# Patient Record
Sex: FEMALE | Race: WHITE | Employment: PART TIME | URBAN - METROPOLITAN AREA
[De-identification: names, ages, dates, MRNs, and addresses within clinical notes are randomized per-mention and may not be internally consistent; named-entity substitution may affect disease eponyms.]

---

## 2017-03-20 ENCOUNTER — TELEPHONE (OUTPATIENT)
Dept: FAMILY MEDICINE CLINIC | Age: 32
End: 2017-03-20

## 2017-03-27 ENCOUNTER — OFFICE VISIT (OUTPATIENT)
Dept: FAMILY MEDICINE CLINIC | Age: 32
End: 2017-03-27

## 2017-03-27 VITALS
OXYGEN SATURATION: 98 % | WEIGHT: 218.1 LBS | HEIGHT: 66 IN | RESPIRATION RATE: 13 BRPM | SYSTOLIC BLOOD PRESSURE: 124 MMHG | BODY MASS INDEX: 35.05 KG/M2 | DIASTOLIC BLOOD PRESSURE: 80 MMHG | HEART RATE: 89 BPM

## 2017-03-27 DIAGNOSIS — E11.9 TYPE 2 DIABETES MELLITUS WITHOUT COMPLICATION, WITHOUT LONG-TERM CURRENT USE OF INSULIN (HCC): Primary | ICD-10-CM

## 2017-03-27 DIAGNOSIS — E78.2 MIXED HYPERLIPIDEMIA: ICD-10-CM

## 2017-03-27 DIAGNOSIS — E88.81 METABOLIC SYNDROME: ICD-10-CM

## 2017-03-27 DIAGNOSIS — M79.672 PAIN OF LEFT HEEL: ICD-10-CM

## 2017-03-27 PROCEDURE — 99214 OFFICE O/P EST MOD 30 MIN: CPT | Performed by: NURSE PRACTITIONER

## 2017-03-27 RX ORDER — ATORVASTATIN CALCIUM 20 MG/1
20 TABLET, FILM COATED ORAL DAILY
Qty: 90 TABLET | Refills: 3 | Status: SHIPPED | OUTPATIENT
Start: 2017-03-27 | End: 2017-12-11 | Stop reason: SDUPTHER

## 2017-03-27 ASSESSMENT — PATIENT HEALTH QUESTIONNAIRE - PHQ9
SUM OF ALL RESPONSES TO PHQ QUESTIONS 1-9: 0
2. FEELING DOWN, DEPRESSED OR HOPELESS: 0
SUM OF ALL RESPONSES TO PHQ9 QUESTIONS 1 & 2: 0
1. LITTLE INTEREST OR PLEASURE IN DOING THINGS: 0

## 2017-03-27 ASSESSMENT — ENCOUNTER SYMPTOMS
ABDOMINAL PAIN: 0
COUGH: 0
NAUSEA: 0
SHORTNESS OF BREATH: 0

## 2017-05-12 ENCOUNTER — PATIENT MESSAGE (OUTPATIENT)
Dept: FAMILY MEDICINE CLINIC | Age: 32
End: 2017-05-12

## 2017-05-12 DIAGNOSIS — F41.1 GAD (GENERALIZED ANXIETY DISORDER): Primary | ICD-10-CM

## 2017-05-15 RX ORDER — VENLAFAXINE HYDROCHLORIDE 75 MG/1
75 CAPSULE, EXTENDED RELEASE ORAL DAILY
Qty: 30 CAPSULE | Refills: 2 | Status: SHIPPED | OUTPATIENT
Start: 2017-05-15 | End: 2017-06-09 | Stop reason: SDUPTHER

## 2017-06-07 ENCOUNTER — PATIENT MESSAGE (OUTPATIENT)
Dept: FAMILY MEDICINE CLINIC | Age: 32
End: 2017-06-07

## 2017-06-07 DIAGNOSIS — F41.1 GAD (GENERALIZED ANXIETY DISORDER): ICD-10-CM

## 2017-06-09 RX ORDER — VENLAFAXINE HYDROCHLORIDE 75 MG/1
75 CAPSULE, EXTENDED RELEASE ORAL DAILY
Qty: 90 CAPSULE | Refills: 1 | Status: SHIPPED | OUTPATIENT
Start: 2017-06-09 | End: 2017-12-11 | Stop reason: SDUPTHER

## 2017-08-17 ENCOUNTER — PATIENT MESSAGE (OUTPATIENT)
Dept: FAMILY MEDICINE CLINIC | Age: 32
End: 2017-08-17

## 2017-08-17 DIAGNOSIS — E11.9 TYPE 2 DIABETES MELLITUS WITHOUT COMPLICATION, WITHOUT LONG-TERM CURRENT USE OF INSULIN (HCC): ICD-10-CM

## 2017-09-27 ENCOUNTER — OFFICE VISIT (OUTPATIENT)
Dept: FAMILY MEDICINE CLINIC | Age: 32
End: 2017-09-27
Payer: COMMERCIAL

## 2017-09-27 VITALS
BODY MASS INDEX: 35.95 KG/M2 | DIASTOLIC BLOOD PRESSURE: 84 MMHG | HEART RATE: 92 BPM | RESPIRATION RATE: 20 BRPM | HEIGHT: 66 IN | SYSTOLIC BLOOD PRESSURE: 132 MMHG | WEIGHT: 223.7 LBS

## 2017-09-27 DIAGNOSIS — E78.2 MIXED HYPERLIPIDEMIA: ICD-10-CM

## 2017-09-27 DIAGNOSIS — E11.9 TYPE 2 DIABETES MELLITUS WITHOUT COMPLICATION, WITHOUT LONG-TERM CURRENT USE OF INSULIN (HCC): Primary | ICD-10-CM

## 2017-09-27 DIAGNOSIS — E66.09 OBESITY DUE TO EXCESS CALORIES, UNSPECIFIED OBESITY SEVERITY: ICD-10-CM

## 2017-09-27 LAB
CREATININE URINE POCT: 300
HBA1C MFR BLD: 7.1 %
MICROALBUMIN/CREAT 24H UR: 150 MG/G{CREAT}
MICROALBUMIN/CREAT UR-RTO: ABNORMAL

## 2017-09-27 PROCEDURE — 83036 HEMOGLOBIN GLYCOSYLATED A1C: CPT | Performed by: NURSE PRACTITIONER

## 2017-09-27 PROCEDURE — 82044 UR ALBUMIN SEMIQUANTITATIVE: CPT | Performed by: NURSE PRACTITIONER

## 2017-09-27 PROCEDURE — 99214 OFFICE O/P EST MOD 30 MIN: CPT | Performed by: NURSE PRACTITIONER

## 2017-09-27 ASSESSMENT — ENCOUNTER SYMPTOMS
NAUSEA: 0
ABDOMINAL PAIN: 0
COUGH: 0
SHORTNESS OF BREATH: 0

## 2017-11-07 ENCOUNTER — OFFICE VISIT (OUTPATIENT)
Dept: FAMILY MEDICINE CLINIC | Age: 32
End: 2017-11-07
Payer: COMMERCIAL

## 2017-11-07 ENCOUNTER — TELEPHONE (OUTPATIENT)
Dept: FAMILY MEDICINE CLINIC | Age: 32
End: 2017-11-07

## 2017-11-07 VITALS
DIASTOLIC BLOOD PRESSURE: 76 MMHG | RESPIRATION RATE: 13 BRPM | BODY MASS INDEX: 35.14 KG/M2 | OXYGEN SATURATION: 98 % | WEIGHT: 223.9 LBS | TEMPERATURE: 98.2 F | HEIGHT: 67 IN | HEART RATE: 94 BPM | SYSTOLIC BLOOD PRESSURE: 126 MMHG

## 2017-11-07 DIAGNOSIS — K64.4 EXTERNAL HEMORRHOIDS: ICD-10-CM

## 2017-11-07 DIAGNOSIS — M79.672 HEEL PAIN, BILATERAL: ICD-10-CM

## 2017-11-07 DIAGNOSIS — M79.671 HEEL PAIN, BILATERAL: ICD-10-CM

## 2017-11-07 DIAGNOSIS — G44.209 ACUTE NON INTRACTABLE TENSION-TYPE HEADACHE: ICD-10-CM

## 2017-11-07 DIAGNOSIS — E11.9 TYPE 2 DIABETES MELLITUS WITHOUT COMPLICATION, WITHOUT LONG-TERM CURRENT USE OF INSULIN (HCC): ICD-10-CM

## 2017-11-07 DIAGNOSIS — E88.81 METABOLIC SYNDROME: ICD-10-CM

## 2017-11-07 DIAGNOSIS — R20.2 NUMBNESS AND TINGLING IN BOTH HANDS: Primary | ICD-10-CM

## 2017-11-07 DIAGNOSIS — R20.0 NUMBNESS AND TINGLING IN BOTH HANDS: Primary | ICD-10-CM

## 2017-11-07 PROCEDURE — 1036F TOBACCO NON-USER: CPT | Performed by: NURSE PRACTITIONER

## 2017-11-07 PROCEDURE — G8482 FLU IMMUNIZE ORDER/ADMIN: HCPCS | Performed by: NURSE PRACTITIONER

## 2017-11-07 PROCEDURE — 99214 OFFICE O/P EST MOD 30 MIN: CPT | Performed by: NURSE PRACTITIONER

## 2017-11-07 PROCEDURE — G8427 DOCREV CUR MEDS BY ELIG CLIN: HCPCS | Performed by: NURSE PRACTITIONER

## 2017-11-07 PROCEDURE — 96372 THER/PROPH/DIAG INJ SC/IM: CPT | Performed by: NURSE PRACTITIONER

## 2017-11-07 PROCEDURE — 3045F PR MOST RECENT HEMOGLOBIN A1C LEVEL 7.0-9.0%: CPT | Performed by: NURSE PRACTITIONER

## 2017-11-07 PROCEDURE — G8417 CALC BMI ABV UP PARAM F/U: HCPCS | Performed by: NURSE PRACTITIONER

## 2017-11-07 RX ORDER — DICLOFENAC SODIUM 75 MG/1
75 TABLET, DELAYED RELEASE ORAL 2 TIMES DAILY
Qty: 60 TABLET | Refills: 3 | Status: SHIPPED | OUTPATIENT
Start: 2017-11-07 | End: 2018-01-29

## 2017-11-07 ASSESSMENT — ENCOUNTER SYMPTOMS
BLOOD IN STOOL: 0
RECTAL PAIN: 1
COUGH: 0
SHORTNESS OF BREATH: 0
NAUSEA: 0
ANAL BLEEDING: 0
ABDOMINAL PAIN: 0

## 2017-11-07 NOTE — PROGRESS NOTES
1127    CO2 24 03/22/2017 1127    BUN 8 03/22/2017 1127    CREATININE 0.6 03/22/2017 1127        Component Value Date/Time    CALCIUM 9.1 03/22/2017 1127    ALKPHOS 67 03/22/2017 1127    AST 12 03/22/2017 1127    ALT 25 03/22/2017 1127    BILITOT 0.2 (L) 03/22/2017 1127            Lab Results   Component Value Date    TSH 1.280 05/14/2015       Lab Results   Component Value Date    WBC 16.2 (H) 06/10/2017    HGB 12.7 06/10/2017    HCT 38.3 06/10/2017    MCV 84.2 06/10/2017     06/10/2017         Health Maintenance   Topic Date Due    DTaP/Tdap/Td vaccine (1 - Tdap) 01/15/2004    Pneumococcal med risk (1 of 1 - PPSV23) 01/15/2004    Diabetic foot exam  02/06/2017    Diabetic retinal exam  03/22/2018    Lipid screen  03/22/2018    Diabetic hemoglobin A1C test  09/27/2018    Diabetic microalbuminuria test  09/27/2018    Cervical cancer screen  01/08/2019    Flu vaccine  Completed    HIV screen  Addressed       Immunization History   Administered Date(s) Administered    Influenza Vaccine, unspecified formulation 09/29/2016    Influenza Virus Vaccine 09/26/2017       Review of Systems   Constitutional: Negative for chills and fever. HENT: Negative. Respiratory: Negative for cough and shortness of breath. Cardiovascular: Negative for chest pain. Gastrointestinal: Positive for rectal pain. Negative for abdominal pain, anal bleeding, blood in stool and nausea. Musculoskeletal: Positive for arthralgias. Skin: Negative for rash. Neurological: Positive for weakness, numbness and headaches. Negative for dizziness and light-headedness. Psychiatric/Behavioral: Negative. Objective:   Physical Exam   Constitutional: She is oriented to person, place, and time. Vital signs are normal. She appears well-developed and well-nourished. She is active. She does not have a sickly appearance. No distress.    HENT:   Right Ear: Tympanic membrane normal.   Left Ear: Tympanic membrane normal.   Nose: Nose normal.   Mouth/Throat: Oropharynx is clear and moist and mucous membranes are normal.   Eyes: EOM are normal. Pupils are equal, round, and reactive to light. Cardiovascular: Normal rate, regular rhythm, S1 normal, S2 normal, normal heart sounds and normal pulses. Exam reveals no S3. No murmur heard. Pulmonary/Chest: Effort normal and breath sounds normal. She has no decreased breath sounds. She has no wheezes. She has no rhonchi. Abdominal: Soft. Bowel sounds are normal. There is no tenderness. Genitourinary:   Genitourinary Comments: Exam deferred    Musculoskeletal:        Right elbow: She exhibits normal range of motion. Left elbow: She exhibits normal range of motion. Right wrist: She exhibits decreased range of motion and tenderness. Left wrist: She exhibits decreased range of motion and tenderness. Cervical back: She exhibits normal range of motion, no tenderness and no bony tenderness. Right hand: She exhibits normal range of motion. Decreased sensation noted. Decreased strength noted. Left hand: She exhibits normal range of motion. Decreased sensation noted. Decreased strength noted. Feet:    Neurological: She is alert and oriented to person, place, and time. No cranial nerve deficit. She displays a negative Romberg sign. Coordination and gait normal.   Psychiatric: She has a normal mood and affect. Her behavior is normal.       Assessment:      1. Numbness and tingling in both hands  diclofenac (VOLTAREN) 75 MG EC tablet    EMG   2. Heel pain, bilateral  diclofenac (VOLTAREN) 75 MG EC tablet    XR FOOT LEFT (MIN 3 VIEWS)    XR FOOT RIGHT (MIN 3 VIEWS)   3. Acute non intractable tension-type headache  ketorolac (TORADOL) injection 60 mg   4. External hemorrhoids  hydrocortisone 2.5 % cream   5.  Type 2 diabetes mellitus without complication, without long-term current use of insulin ([facility])  linagliptin (TRADJENTA) 5 MG tablet    metFORMIN (GLUCOPHAGE) 1000 MG tablet     DIABETES FOOT EXAM   6. Metabolic syndrome             Plan:      EMG UE   - bilateral aspect points more neck vs CTS  Diclofenac BID  TORADOL 60 mg IM   - hand, heel and HA pain  XR heels   - not plantar fascitis related  Hydrocortisone Cream BID for hemorroids  DM stable  Refills as above  KNA

## 2017-11-08 NOTE — TELEPHONE ENCOUNTER
Dr. Schaeffer Ast referral form complete and faxed to [address] [place] [address] at [phone]. Patient informed [address] [place] [address] will call her to schedule, voiced understanding.

## 2017-12-04 ENCOUNTER — TELEPHONE (OUTPATIENT)
Dept: FAMILY MEDICINE CLINIC | Age: 32
End: 2017-12-04

## 2017-12-04 NOTE — TELEPHONE ENCOUNTER
Pt called office stating she just had the EMG done at [facility] today. She wants to know if she should schedule a follow up appt to go over the results or if we will just call her. Please advise.

## 2017-12-06 NOTE — TELEPHONE ENCOUNTER
Results reviewed.   Showed Carpal Tunnel - can schedule appt to discuss treatment options or we can refer her to [facility]

## 2017-12-11 ENCOUNTER — OFFICE VISIT (OUTPATIENT)
Dept: FAMILY MEDICINE CLINIC | Age: 32
End: 2017-12-11
Payer: COMMERCIAL

## 2017-12-11 VITALS
BODY MASS INDEX: 35.63 KG/M2 | OXYGEN SATURATION: 98 % | DIASTOLIC BLOOD PRESSURE: 82 MMHG | HEIGHT: 67 IN | HEART RATE: 112 BPM | SYSTOLIC BLOOD PRESSURE: 144 MMHG | RESPIRATION RATE: 14 BRPM | WEIGHT: 227 LBS

## 2017-12-11 DIAGNOSIS — E78.2 MIXED HYPERLIPIDEMIA: ICD-10-CM

## 2017-12-11 DIAGNOSIS — G56.03 BILATERAL CARPAL TUNNEL SYNDROME: Primary | ICD-10-CM

## 2017-12-11 DIAGNOSIS — F41.1 GAD (GENERALIZED ANXIETY DISORDER): ICD-10-CM

## 2017-12-11 PROCEDURE — 4004F PT TOBACCO SCREEN RCVD TLK: CPT | Performed by: NURSE PRACTITIONER

## 2017-12-11 PROCEDURE — G8482 FLU IMMUNIZE ORDER/ADMIN: HCPCS | Performed by: NURSE PRACTITIONER

## 2017-12-11 PROCEDURE — 99213 OFFICE O/P EST LOW 20 MIN: CPT | Performed by: NURSE PRACTITIONER

## 2017-12-11 PROCEDURE — G8417 CALC BMI ABV UP PARAM F/U: HCPCS | Performed by: NURSE PRACTITIONER

## 2017-12-11 PROCEDURE — G8427 DOCREV CUR MEDS BY ELIG CLIN: HCPCS | Performed by: NURSE PRACTITIONER

## 2017-12-11 RX ORDER — ATORVASTATIN CALCIUM 20 MG/1
20 TABLET, FILM COATED ORAL DAILY
Qty: 90 TABLET | Refills: 3 | Status: SHIPPED | OUTPATIENT
Start: 2017-12-11 | End: 2018-11-21 | Stop reason: SDUPTHER

## 2017-12-11 RX ORDER — VENLAFAXINE HYDROCHLORIDE 75 MG/1
75 CAPSULE, EXTENDED RELEASE ORAL DAILY
Qty: 90 CAPSULE | Refills: 3 | Status: SHIPPED | OUTPATIENT
Start: 2017-12-11 | End: 2018-11-21 | Stop reason: SDUPTHER

## 2017-12-11 ASSESSMENT — ENCOUNTER SYMPTOMS
NAUSEA: 0
ABDOMINAL PAIN: 0
SHORTNESS OF BREATH: 0
COUGH: 0

## 2017-12-11 NOTE — PROGRESS NOTES
Subjective:      Patient ID: Alva Rincon is a 28 y.o. female. HPI: Discuss EMG    Chief Complaint   Patient presents with    Results     EMG     EMG showed bilateral CTS with R > L. Numb to her finger tips bilateral hands. Numbness goes up forearm. Painful ROM of wrists.  strength is weak. Symptoms were previous off and on with more constant 1 week. Hurts all the time. Denies neck pain or stiffness. Patient Active Problem List   Diagnosis    Asthma with bronchitis    Diabetes mellitus, type 2 ([address]    Hyperlipidemia    Obesity    Metabolic syndrome       Review of Systems   Constitutional: Negative for chills and fever. HENT: Negative. Respiratory: Negative for cough and shortness of breath. Cardiovascular: Negative for chest pain. Gastrointestinal: Negative for abdominal pain and nausea. Skin: Negative for rash. Neurological: Positive for weakness and numbness. Negative for dizziness, light-headedness and headaches. Psychiatric/Behavioral: Negative. Objective:   Physical Exam   Constitutional: She is oriented to person, place, and time. Vital signs are normal. She appears well-developed and well-nourished. She is active. She does not have a sickly appearance. No distress. HENT:   Right Ear: Tympanic membrane normal.   Left Ear: Tympanic membrane normal.   Nose: Nose normal.   Mouth/Throat: Oropharynx is clear and moist and mucous membranes are normal.   Cardiovascular: Normal rate, regular rhythm, S1 normal, S2 normal, normal heart sounds and normal pulses. Exam reveals no S3. No murmur heard. Pulmonary/Chest: Effort normal and breath sounds normal. She has no decreased breath sounds. She has no wheezes. She has no rhonchi. Abdominal: Soft. Bowel sounds are normal. There is no tenderness. Musculoskeletal:        Right wrist: She exhibits decreased range of motion and tenderness. Left wrist: She exhibits decreased range of motion and tenderness. Right hand: Decreased sensation noted. Left hand: Decreased sensation noted. Neurological: She is alert and oriented to person, place, and time. Psychiatric: She has a normal mood and affect. Her behavior is normal.       Assessment:      1. Bilateral carpal tunnel syndrome  [facility]   2. GREER (generalized anxiety disorder)  venlafaxine (EFFEXOR XR) 75 MG extended release capsule   3.  Mixed hyperlipidemia  atorvastatin (LIPITOR) 20 MG tablet           Plan:      EMG results reviewed  Tx options discussed  Refer to [facility]TH[facility]  Chronic conditions stable - refill as above  MANUEL

## 2017-12-11 NOTE — PROGRESS NOTES
Visit Information    Have you changed or started any medications since your last visit including any over-the-counter medicines, vitamins, or herbal medicines? no   Have you stopped taking any of your medications? Is so, why? -  no  Are you having any side effects from any of your medications? - no    Have you seen any other physician or provider since your last visit? yes - Dr Payam Valdes   Have you had any other diagnostic tests since your last visit? yes - EMG   Have you been seen in the emergency room and/or had an admission in a hospital since we last saw you?  no   Have you had your routine dental cleaning in the past 6 months?  yes -  Dr Ayse Hudson     Do you have an active [facility] account? If no, what is the barrier?   Yes    Patient Care Team:  Shirlene Garcia NP as PCP - General    Medical History Review  Past Medical, Family, and Social History reviewed and does not contribute to the patient presenting condition    Health Maintenance   Topic Date Due    DTaP/Tdap/Td vaccine (1 - Tdap) 01/15/2004    Pneumococcal med risk (1 of 1 - PPSV23) 01/15/2004    Diabetic retinal exam  03/22/2018    Lipid screen  03/22/2018    Diabetic hemoglobin A1C test  09/27/2018    Diabetic microalbuminuria test  09/27/2018    Diabetic foot exam  11/07/2018    Cervical cancer screen  01/08/2019    Flu vaccine  Completed    HIV screen  Addressed

## 2017-12-12 ENCOUNTER — TELEPHONE (OUTPATIENT)
Dept: FAMILY MEDICINE CLINIC | Age: 32
End: 2017-12-12

## 2018-01-22 ENCOUNTER — HOSPITAL ENCOUNTER (OUTPATIENT)
Age: 33
Discharge: HOME | End: 2018-01-22
Payer: COMMERCIAL

## 2018-01-22 DIAGNOSIS — E11.9 TYPE 2 DIABETES MELLITUS WITHOUT COMPLICATION, WITHOUT LONG-TERM CURRENT USE OF INSULIN (HCC): ICD-10-CM

## 2018-01-22 DIAGNOSIS — E78.2 MIXED HYPERLIPIDEMIA: ICD-10-CM

## 2018-01-22 LAB
ALBUMIN SERPL-MCNC: 4.2 G/DL (ref 3.5–5.1)
ALP BLD-CCNC: 75 U/L (ref 38–126)
ALT SERPL-CCNC: 28 U/L (ref 11–66)
AST SERPL-CCNC: 14 U/L (ref 5–40)
AVERAGE GLUCOSE: 156 MG/DL (ref 70–126)
BILIRUB SERPL-MCNC: 0.3 MG/DL (ref 0.3–1.2)
BILIRUBIN DIRECT: < 0.2 MG/DL (ref 0–0.3)
CHOLESTEROL, TOTAL: 147 MG/DL (ref 100–199)
HBA1C MFR BLD: 7.2 % (ref 4.4–6.4)
HDLC SERPL-MCNC: 37 MG/DL
LDL CHOLESTEROL CALCULATED: 89 MG/DL
TOTAL PROTEIN: 7.2 G/DL (ref 6.1–8)
TRIGL SERPL-MCNC: 106 MG/DL (ref 0–199)

## 2018-01-22 PROCEDURE — 80076 HEPATIC FUNCTION PANEL: CPT

## 2018-01-22 PROCEDURE — 80061 LIPID PANEL: CPT

## 2018-01-22 PROCEDURE — 36415 COLL VENOUS BLD VENIPUNCTURE: CPT

## 2018-01-22 PROCEDURE — 83036 HEMOGLOBIN GLYCOSYLATED A1C: CPT

## 2018-01-29 ENCOUNTER — OFFICE VISIT (OUTPATIENT)
Dept: FAMILY MEDICINE CLINIC | Age: 33
End: 2018-01-29
Payer: COMMERCIAL

## 2018-01-29 VITALS
BODY MASS INDEX: 35.61 KG/M2 | RESPIRATION RATE: 16 BRPM | DIASTOLIC BLOOD PRESSURE: 82 MMHG | SYSTOLIC BLOOD PRESSURE: 138 MMHG | HEART RATE: 100 BPM | WEIGHT: 224 LBS | TEMPERATURE: 99.2 F

## 2018-01-29 DIAGNOSIS — E11.9 TYPE 2 DIABETES MELLITUS WITHOUT COMPLICATION, WITHOUT LONG-TERM CURRENT USE OF INSULIN (HCC): Primary | ICD-10-CM

## 2018-01-29 DIAGNOSIS — E88.81 METABOLIC SYNDROME: ICD-10-CM

## 2018-01-29 DIAGNOSIS — E78.2 MIXED HYPERLIPIDEMIA: ICD-10-CM

## 2018-01-29 PROCEDURE — G8417 CALC BMI ABV UP PARAM F/U: HCPCS | Performed by: NURSE PRACTITIONER

## 2018-01-29 PROCEDURE — G8482 FLU IMMUNIZE ORDER/ADMIN: HCPCS | Performed by: NURSE PRACTITIONER

## 2018-01-29 PROCEDURE — 4004F PT TOBACCO SCREEN RCVD TLK: CPT | Performed by: NURSE PRACTITIONER

## 2018-01-29 PROCEDURE — G8427 DOCREV CUR MEDS BY ELIG CLIN: HCPCS | Performed by: NURSE PRACTITIONER

## 2018-01-29 PROCEDURE — 3045F PR MOST RECENT HEMOGLOBIN A1C LEVEL 7.0-9.0%: CPT | Performed by: NURSE PRACTITIONER

## 2018-01-29 PROCEDURE — 99214 OFFICE O/P EST MOD 30 MIN: CPT | Performed by: NURSE PRACTITIONER

## 2018-01-29 ASSESSMENT — ENCOUNTER SYMPTOMS
NAUSEA: 0
COUGH: 0
ABDOMINAL PAIN: 0

## 2018-01-29 NOTE — PROGRESS NOTES
BILITOT 0.3 01/22/2018 1055            Lab Results   Component Value Date    TSH 1.280 05/14/2015       Lab Results   Component Value Date    WBC 16.2 (H) 06/10/2017    HGB 12.7 06/10/2017    HCT 38.3 06/10/2017    MCV 84.2 06/10/2017     06/10/2017         Health Maintenance   Topic Date Due    DTaP/Tdap/Td vaccine (1 - Tdap) 01/15/2004    Pneumococcal med risk (1 of 1 - PPSV23) 01/15/2004    Diabetic retinal exam  03/22/2018    Diabetic microalbuminuria test  09/27/2018    Diabetic foot exam  11/07/2018    Cervical cancer screen  01/08/2019    A1C test (Diabetic or Prediabetic)  01/22/2019    Lipid screen  01/22/2019    Flu vaccine  Completed    HIV screen  Addressed       Immunization History   Administered Date(s) Administered    Influenza Vaccine, unspecified formulation 09/29/2016    Influenza Virus Vaccine 09/26/2017       Review of Systems   Constitutional: Negative for chills and fever. HENT: Negative. Respiratory: Negative for cough. Gastrointestinal: Negative for abdominal pain and nausea. Skin: Negative for rash. Neurological: Negative for light-headedness. Psychiatric/Behavioral: Negative. Objective:   Physical Exam   Constitutional: She is oriented to person, place, and time. Vital signs are normal. She appears well-developed and well-nourished. She is active. She does not have a sickly appearance. No distress. HENT:   Right Ear: Tympanic membrane normal.   Left Ear: Tympanic membrane normal.   Nose: Nose normal.   Mouth/Throat: Oropharynx is clear and moist and mucous membranes are normal.   Cardiovascular: Normal rate, regular rhythm, S1 normal, S2 normal, normal heart sounds and normal pulses. Exam reveals no S3. No murmur heard. Pulmonary/Chest: Effort normal and breath sounds normal. She has no decreased breath sounds. She has no wheezes. She has no rhonchi. Abdominal: Soft. Bowel sounds are normal. There is no tenderness.    Neurological: She is

## 2018-03-01 ENCOUNTER — PATIENT MESSAGE (OUTPATIENT)
Dept: FAMILY MEDICINE CLINIC | Age: 33
End: 2018-03-01

## 2018-03-01 DIAGNOSIS — N76.0 ACUTE VAGINITIS: Primary | ICD-10-CM

## 2018-03-01 RX ORDER — FLUCONAZOLE 150 MG/1
150 TABLET ORAL ONCE
Qty: 2 TABLET | Refills: 0 | Status: SHIPPED | OUTPATIENT
Start: 2018-03-01 | End: 2018-03-01

## 2018-04-04 ENCOUNTER — PATIENT MESSAGE (OUTPATIENT)
Dept: FAMILY MEDICINE CLINIC | Age: 33
End: 2018-04-04

## 2018-04-04 DIAGNOSIS — E11.9 TYPE 2 DIABETES MELLITUS WITHOUT COMPLICATION, WITHOUT LONG-TERM CURRENT USE OF INSULIN (HCC): Primary | ICD-10-CM

## 2018-04-04 DIAGNOSIS — N76.0 ACUTE VAGINITIS: Primary | ICD-10-CM

## 2018-04-04 RX ORDER — FLUCONAZOLE 150 MG/1
150 TABLET ORAL ONCE
Qty: 2 TABLET | Refills: 0 | Status: SHIPPED | OUTPATIENT
Start: 2018-04-04 | End: 2018-04-04

## 2018-05-29 ENCOUNTER — OFFICE VISIT (OUTPATIENT)
Dept: FAMILY MEDICINE CLINIC | Age: 33
End: 2018-05-29
Payer: COMMERCIAL

## 2018-05-29 VITALS
HEART RATE: 102 BPM | DIASTOLIC BLOOD PRESSURE: 80 MMHG | SYSTOLIC BLOOD PRESSURE: 136 MMHG | HEIGHT: 66 IN | BODY MASS INDEX: 35.84 KG/M2 | RESPIRATION RATE: 14 BRPM | OXYGEN SATURATION: 99 % | WEIGHT: 223 LBS

## 2018-05-29 DIAGNOSIS — E11.9 TYPE 2 DIABETES MELLITUS WITHOUT COMPLICATION, WITHOUT LONG-TERM CURRENT USE OF INSULIN (HCC): Primary | ICD-10-CM

## 2018-05-29 DIAGNOSIS — E78.2 MIXED HYPERLIPIDEMIA: ICD-10-CM

## 2018-05-29 DIAGNOSIS — E88.81 METABOLIC SYNDROME: ICD-10-CM

## 2018-05-29 LAB — HBA1C MFR BLD: 6.9 %

## 2018-05-29 PROCEDURE — 2022F DILAT RTA XM EVC RTNOPTHY: CPT | Performed by: NURSE PRACTITIONER

## 2018-05-29 PROCEDURE — 99214 OFFICE O/P EST MOD 30 MIN: CPT | Performed by: NURSE PRACTITIONER

## 2018-05-29 PROCEDURE — 3044F HG A1C LEVEL LT 7.0%: CPT | Performed by: NURSE PRACTITIONER

## 2018-05-29 PROCEDURE — G8427 DOCREV CUR MEDS BY ELIG CLIN: HCPCS | Performed by: NURSE PRACTITIONER

## 2018-05-29 PROCEDURE — G8417 CALC BMI ABV UP PARAM F/U: HCPCS | Performed by: NURSE PRACTITIONER

## 2018-05-29 PROCEDURE — 83036 HEMOGLOBIN GLYCOSYLATED A1C: CPT | Performed by: NURSE PRACTITIONER

## 2018-05-29 PROCEDURE — 4004F PT TOBACCO SCREEN RCVD TLK: CPT | Performed by: NURSE PRACTITIONER

## 2018-05-29 RX ORDER — PHENTERMINE HYDROCHLORIDE 37.5 MG/1
37.5 CAPSULE ORAL EVERY MORNING
Qty: 30 CAPSULE | Refills: 0 | Status: SHIPPED | OUTPATIENT
Start: 2018-05-29 | End: 2018-06-26 | Stop reason: SDUPTHER

## 2018-05-29 ASSESSMENT — PATIENT HEALTH QUESTIONNAIRE - PHQ9
2. FEELING DOWN, DEPRESSED OR HOPELESS: 0
SUM OF ALL RESPONSES TO PHQ QUESTIONS 1-9: 0
1. LITTLE INTEREST OR PLEASURE IN DOING THINGS: 0
SUM OF ALL RESPONSES TO PHQ9 QUESTIONS 1 & 2: 0

## 2018-05-29 ASSESSMENT — ENCOUNTER SYMPTOMS
NAUSEA: 0
COUGH: 0
ABDOMINAL PAIN: 0

## 2018-06-26 ENCOUNTER — OFFICE VISIT (OUTPATIENT)
Dept: FAMILY MEDICINE CLINIC | Age: 33
End: 2018-06-26
Payer: COMMERCIAL

## 2018-06-26 VITALS
HEIGHT: 66 IN | HEART RATE: 92 BPM | DIASTOLIC BLOOD PRESSURE: 72 MMHG | BODY MASS INDEX: 32.59 KG/M2 | SYSTOLIC BLOOD PRESSURE: 118 MMHG | WEIGHT: 202.8 LBS | RESPIRATION RATE: 16 BRPM

## 2018-06-26 DIAGNOSIS — E78.2 MIXED HYPERLIPIDEMIA: ICD-10-CM

## 2018-06-26 DIAGNOSIS — E11.9 TYPE 2 DIABETES MELLITUS WITHOUT COMPLICATION, WITHOUT LONG-TERM CURRENT USE OF INSULIN (HCC): ICD-10-CM

## 2018-06-26 PROCEDURE — 99213 OFFICE O/P EST LOW 20 MIN: CPT | Performed by: NURSE PRACTITIONER

## 2018-06-26 PROCEDURE — 3044F HG A1C LEVEL LT 7.0%: CPT | Performed by: NURSE PRACTITIONER

## 2018-06-26 PROCEDURE — G8427 DOCREV CUR MEDS BY ELIG CLIN: HCPCS | Performed by: NURSE PRACTITIONER

## 2018-06-26 PROCEDURE — 4004F PT TOBACCO SCREEN RCVD TLK: CPT | Performed by: NURSE PRACTITIONER

## 2018-06-26 PROCEDURE — 2022F DILAT RTA XM EVC RTNOPTHY: CPT | Performed by: NURSE PRACTITIONER

## 2018-06-26 PROCEDURE — G8417 CALC BMI ABV UP PARAM F/U: HCPCS | Performed by: NURSE PRACTITIONER

## 2018-06-26 RX ORDER — PHENTERMINE HYDROCHLORIDE 37.5 MG/1
37.5 CAPSULE ORAL EVERY MORNING
Qty: 30 CAPSULE | Refills: 0 | Status: SHIPPED | OUTPATIENT
Start: 2018-06-26 | End: 2018-07-24 | Stop reason: SDUPTHER

## 2018-06-26 ASSESSMENT — ENCOUNTER SYMPTOMS
SHORTNESS OF BREATH: 0
NAUSEA: 0
COUGH: 0
ABDOMINAL PAIN: 0

## 2018-07-24 ENCOUNTER — OFFICE VISIT (OUTPATIENT)
Dept: FAMILY MEDICINE CLINIC | Age: 33
End: 2018-07-24
Payer: COMMERCIAL

## 2018-07-24 VITALS
SYSTOLIC BLOOD PRESSURE: 128 MMHG | HEIGHT: 66 IN | DIASTOLIC BLOOD PRESSURE: 76 MMHG | WEIGHT: 198.7 LBS | HEART RATE: 84 BPM | RESPIRATION RATE: 16 BRPM | BODY MASS INDEX: 31.93 KG/M2

## 2018-07-24 DIAGNOSIS — E88.81 METABOLIC SYNDROME: ICD-10-CM

## 2018-07-24 DIAGNOSIS — E78.2 MIXED HYPERLIPIDEMIA: ICD-10-CM

## 2018-07-24 DIAGNOSIS — E11.9 TYPE 2 DIABETES MELLITUS WITHOUT COMPLICATION, WITHOUT LONG-TERM CURRENT USE OF INSULIN (HCC): ICD-10-CM

## 2018-07-24 DIAGNOSIS — E66.09 CLASS 1 OBESITY DUE TO EXCESS CALORIES WITH SERIOUS COMORBIDITY AND BODY MASS INDEX (BMI) OF 32.0 TO 32.9 IN ADULT: Primary | ICD-10-CM

## 2018-07-24 PROCEDURE — 2022F DILAT RTA XM EVC RTNOPTHY: CPT | Performed by: NURSE PRACTITIONER

## 2018-07-24 PROCEDURE — G8427 DOCREV CUR MEDS BY ELIG CLIN: HCPCS | Performed by: NURSE PRACTITIONER

## 2018-07-24 PROCEDURE — 99213 OFFICE O/P EST LOW 20 MIN: CPT | Performed by: NURSE PRACTITIONER

## 2018-07-24 PROCEDURE — G8417 CALC BMI ABV UP PARAM F/U: HCPCS | Performed by: NURSE PRACTITIONER

## 2018-07-24 PROCEDURE — 4004F PT TOBACCO SCREEN RCVD TLK: CPT | Performed by: NURSE PRACTITIONER

## 2018-07-24 PROCEDURE — 3044F HG A1C LEVEL LT 7.0%: CPT | Performed by: NURSE PRACTITIONER

## 2018-07-24 RX ORDER — PHENTERMINE HYDROCHLORIDE 37.5 MG/1
37.5 CAPSULE ORAL EVERY MORNING
Qty: 30 CAPSULE | Refills: 0 | Status: SHIPPED | OUTPATIENT
Start: 2018-07-24 | End: 2018-08-23

## 2018-07-24 ASSESSMENT — ENCOUNTER SYMPTOMS
NAUSEA: 0
COUGH: 0
SHORTNESS OF BREATH: 0
ABDOMINAL PAIN: 0

## 2018-07-24 NOTE — PROGRESS NOTES
Subjective:      Patient ID: Juana Prado is a 35 y.o. female. HPI  : 1 month Follow Up    Chief Complaint   Patient presents with    1 Month Follow-Up     #2 on Adipex down 4 lbs, down 25 overall     Adipex #2. Tolerating well. Lost 4#. Total to date lost 25#. Staying active. Watching food intake. Counting calories and restricting. No exercise regime. Body mass index is 32.07 kg/m². Wt Readings from Last 3 Encounters:   07/24/18 198 lb 11.2 oz (90.1 kg)   06/26/18 202 lb 12.8 oz (92 kg)   05/29/18 223 lb (101.2 kg)     Patient Active Problem List   Diagnosis    Asthma with bronchitis    Diabetes mellitus, type 2 ([address]    Hyperlipidemia    Class 1 obesity due to excess calories with serious comorbidity and body mass index (BMI) of 32.0 to 32.9 in adult    Metabolic syndrome     Lab Results   Component Value Date    LABA1C 6.9 05/29/2018     No results found for: EAG    BP Readings from Last 3 Encounters:   07/24/18 128/76   06/26/18 118/72   05/29/18 136/80       Review of Systems   Constitutional: Negative for chills and fever. HENT: Negative. Respiratory: Negative for cough and shortness of breath. Cardiovascular: Negative for chest pain. Gastrointestinal: Negative for abdominal pain and nausea. Skin: Negative for rash. Neurological: Negative for dizziness, light-headedness and headaches. Psychiatric/Behavioral: Negative. Objective:   Physical Exam   Constitutional: She is oriented to person, place, and time. Vital signs are normal. She appears well-developed and well-nourished. She is active. She does not have a sickly appearance. No distress. HENT:   Right Ear: Tympanic membrane normal.   Left Ear: Tympanic membrane normal.   Nose: Nose normal.   Mouth/Throat: Oropharynx is clear and moist and mucous membranes are normal.   Cardiovascular: Normal rate, regular rhythm, S1 normal, S2 normal, normal heart sounds and normal pulses. Exam reveals no S3.     No murmur heard.  Pulmonary/Chest: Effort normal and breath sounds normal. She has no decreased breath sounds. She has no wheezes. She has no rhonchi. Abdominal: Soft. Bowel sounds are normal. There is no tenderness. Neurological: She is alert and oriented to person, place, and time. Psychiatric: She has a normal mood and affect. Her behavior is normal.       Assessment:       Diagnosis Orders   1. Class 1 obesity due to excess calories with serious comorbidity and body mass index (BMI) of 32.0 to 32.9 in adult  phentermine (ADIPEX-P) 37.5 MG capsule   2. Type 2 diabetes mellitus without complication, without long-term current use of insulin (HCC)  Hemoglobin A1C   3. Mixed hyperlipidemia  Lipid Panel   4.  Metabolic syndrome             Plan:      Lost 4# - total Lost 25#  Adipex #3  Diet and exercise discussed  Repeat labs upon adipex completion  RTO in 6 month

## 2018-09-18 DIAGNOSIS — E11.9 TYPE 2 DIABETES MELLITUS WITHOUT COMPLICATION, WITHOUT LONG-TERM CURRENT USE OF INSULIN (HCC): ICD-10-CM

## 2018-09-24 ENCOUNTER — PATIENT MESSAGE (OUTPATIENT)
Dept: FAMILY MEDICINE CLINIC | Age: 33
End: 2018-09-24

## 2018-09-24 DIAGNOSIS — M25.559 PAIN IN JOINT INVOLVING PELVIC REGION AND THIGH, UNSPECIFIED LATERALITY: Primary | ICD-10-CM

## 2018-09-24 NOTE — TELEPHONE ENCOUNTER
From: Mejia Horta  To: SUNNY Solano - CNP  Sent: 9/24/2018 3:06 PM EDT  Subject: Prescription Question    Hi there! I just came from the chiropractor and my pelvis is out of alignment. Would you be able to send in a steroid script for my inflammation? Dr Michel Galindo thinks that will help.

## 2018-09-25 RX ORDER — PREDNISONE 50 MG/1
50 TABLET ORAL DAILY
Qty: 4 TABLET | Refills: 0 | Status: SHIPPED | OUTPATIENT
Start: 2018-09-25 | End: 2018-09-29

## 2018-11-05 ENCOUNTER — HOSPITAL ENCOUNTER (OUTPATIENT)
Age: 33
Discharge: HOME | End: 2018-11-05
Payer: COMMERCIAL

## 2018-11-05 DIAGNOSIS — E11.9 TYPE 2 DIABETES MELLITUS WITHOUT COMPLICATION, WITHOUT LONG-TERM CURRENT USE OF INSULIN (HCC): ICD-10-CM

## 2018-11-05 DIAGNOSIS — E78.2 MIXED HYPERLIPIDEMIA: ICD-10-CM

## 2018-11-05 LAB
AVERAGE GLUCOSE: 117 MG/DL (ref 70–126)
CHOLESTEROL, TOTAL: 118 MG/DL (ref 100–199)
HBA1C MFR BLD: 5.9 % (ref 4.4–6.4)
HDLC SERPL-MCNC: 43 MG/DL
LDL CHOLESTEROL CALCULATED: 63 MG/DL
TRIGL SERPL-MCNC: 59 MG/DL (ref 0–199)

## 2018-11-05 PROCEDURE — 83036 HEMOGLOBIN GLYCOSYLATED A1C: CPT

## 2018-11-05 PROCEDURE — 36415 COLL VENOUS BLD VENIPUNCTURE: CPT

## 2018-11-05 PROCEDURE — 80061 LIPID PANEL: CPT

## 2018-11-20 ENCOUNTER — PATIENT MESSAGE (OUTPATIENT)
Dept: FAMILY MEDICINE CLINIC | Age: 33
End: 2018-11-20

## 2018-11-20 DIAGNOSIS — F41.1 GAD (GENERALIZED ANXIETY DISORDER): ICD-10-CM

## 2018-11-20 DIAGNOSIS — E11.9 TYPE 2 DIABETES MELLITUS WITHOUT COMPLICATION, WITHOUT LONG-TERM CURRENT USE OF INSULIN (HCC): ICD-10-CM

## 2018-11-20 DIAGNOSIS — E78.2 MIXED HYPERLIPIDEMIA: ICD-10-CM

## 2018-11-21 RX ORDER — VENLAFAXINE HYDROCHLORIDE 75 MG/1
75 CAPSULE, EXTENDED RELEASE ORAL DAILY
Qty: 90 CAPSULE | Refills: 0 | Status: SHIPPED | OUTPATIENT
Start: 2018-11-21 | End: 2019-01-24 | Stop reason: SDUPTHER

## 2018-11-21 RX ORDER — ATORVASTATIN CALCIUM 20 MG/1
20 TABLET, FILM COATED ORAL DAILY
Qty: 90 TABLET | Refills: 0 | Status: SHIPPED | OUTPATIENT
Start: 2018-11-21 | End: 2019-01-24 | Stop reason: SDUPTHER

## 2019-01-24 ENCOUNTER — OFFICE VISIT (OUTPATIENT)
Dept: FAMILY MEDICINE CLINIC | Age: 34
End: 2019-01-24
Payer: COMMERCIAL

## 2019-01-24 VITALS
WEIGHT: 204.8 LBS | SYSTOLIC BLOOD PRESSURE: 134 MMHG | DIASTOLIC BLOOD PRESSURE: 76 MMHG | RESPIRATION RATE: 20 BRPM | HEART RATE: 88 BPM | BODY MASS INDEX: 33.06 KG/M2

## 2019-01-24 DIAGNOSIS — F41.1 GAD (GENERALIZED ANXIETY DISORDER): ICD-10-CM

## 2019-01-24 DIAGNOSIS — E78.2 MIXED HYPERLIPIDEMIA: ICD-10-CM

## 2019-01-24 DIAGNOSIS — E66.09 CLASS 1 OBESITY DUE TO EXCESS CALORIES WITH SERIOUS COMORBIDITY AND BODY MASS INDEX (BMI) OF 32.0 TO 32.9 IN ADULT: Primary | ICD-10-CM

## 2019-01-24 DIAGNOSIS — J45.909 ASTHMA WITH BRONCHITIS: ICD-10-CM

## 2019-01-24 DIAGNOSIS — E11.9 TYPE 2 DIABETES MELLITUS WITHOUT COMPLICATION, WITHOUT LONG-TERM CURRENT USE OF INSULIN (HCC): ICD-10-CM

## 2019-01-24 DIAGNOSIS — E88.81 METABOLIC SYNDROME: ICD-10-CM

## 2019-01-24 PROCEDURE — G8484 FLU IMMUNIZE NO ADMIN: HCPCS | Performed by: NURSE PRACTITIONER

## 2019-01-24 PROCEDURE — 3046F HEMOGLOBIN A1C LEVEL >9.0%: CPT | Performed by: NURSE PRACTITIONER

## 2019-01-24 PROCEDURE — G8417 CALC BMI ABV UP PARAM F/U: HCPCS | Performed by: NURSE PRACTITIONER

## 2019-01-24 PROCEDURE — 99214 OFFICE O/P EST MOD 30 MIN: CPT | Performed by: NURSE PRACTITIONER

## 2019-01-24 PROCEDURE — G8427 DOCREV CUR MEDS BY ELIG CLIN: HCPCS | Performed by: NURSE PRACTITIONER

## 2019-01-24 PROCEDURE — 4004F PT TOBACCO SCREEN RCVD TLK: CPT | Performed by: NURSE PRACTITIONER

## 2019-01-24 PROCEDURE — 2022F DILAT RTA XM EVC RTNOPTHY: CPT | Performed by: NURSE PRACTITIONER

## 2019-01-24 RX ORDER — ATORVASTATIN CALCIUM 20 MG/1
20 TABLET, FILM COATED ORAL DAILY
Qty: 90 TABLET | Refills: 3 | Status: SHIPPED | OUTPATIENT
Start: 2019-01-24 | End: 2020-02-10 | Stop reason: SDUPTHER

## 2019-01-24 RX ORDER — VENLAFAXINE HYDROCHLORIDE 75 MG/1
75 CAPSULE, EXTENDED RELEASE ORAL DAILY
Qty: 90 CAPSULE | Refills: 3 | Status: SHIPPED | OUTPATIENT
Start: 2019-01-24 | End: 2019-10-16 | Stop reason: SDUPTHER

## 2019-01-24 ASSESSMENT — ENCOUNTER SYMPTOMS
NAUSEA: 0
COUGH: 0
ABDOMINAL PAIN: 0

## 2019-02-06 ENCOUNTER — OFFICE VISIT (OUTPATIENT)
Dept: FAMILY MEDICINE CLINIC | Age: 34
End: 2019-02-06
Payer: COMMERCIAL

## 2019-02-06 VITALS
OXYGEN SATURATION: 95 % | TEMPERATURE: 97.6 F | WEIGHT: 207 LBS | BODY MASS INDEX: 32.49 KG/M2 | HEIGHT: 67 IN | RESPIRATION RATE: 20 BRPM | SYSTOLIC BLOOD PRESSURE: 126 MMHG | DIASTOLIC BLOOD PRESSURE: 84 MMHG | HEART RATE: 88 BPM

## 2019-02-06 DIAGNOSIS — R05.9 COUGH: ICD-10-CM

## 2019-02-06 DIAGNOSIS — H65.02 ACUTE SEROUS OTITIS MEDIA OF LEFT EAR, RECURRENCE NOT SPECIFIED: Primary | ICD-10-CM

## 2019-02-06 DIAGNOSIS — S39.012A STRAIN OF LUMBAR REGION, INITIAL ENCOUNTER: ICD-10-CM

## 2019-02-06 PROCEDURE — G8417 CALC BMI ABV UP PARAM F/U: HCPCS | Performed by: NURSE PRACTITIONER

## 2019-02-06 PROCEDURE — 4004F PT TOBACCO SCREEN RCVD TLK: CPT | Performed by: NURSE PRACTITIONER

## 2019-02-06 PROCEDURE — G8427 DOCREV CUR MEDS BY ELIG CLIN: HCPCS | Performed by: NURSE PRACTITIONER

## 2019-02-06 PROCEDURE — 99213 OFFICE O/P EST LOW 20 MIN: CPT | Performed by: NURSE PRACTITIONER

## 2019-02-06 PROCEDURE — G8484 FLU IMMUNIZE NO ADMIN: HCPCS | Performed by: NURSE PRACTITIONER

## 2019-02-06 RX ORDER — AMOXICILLIN AND CLAVULANATE POTASSIUM 875; 125 MG/1; MG/1
1 TABLET, FILM COATED ORAL 2 TIMES DAILY WITH MEALS
Qty: 20 TABLET | Refills: 0 | Status: SHIPPED | OUTPATIENT
Start: 2019-02-06 | End: 2019-02-16

## 2019-02-06 RX ORDER — DICLOFENAC SODIUM 75 MG/1
75 TABLET, DELAYED RELEASE ORAL 2 TIMES DAILY
Qty: 30 TABLET | Refills: 0 | Status: SHIPPED | OUTPATIENT
Start: 2019-02-06 | End: 2019-02-27 | Stop reason: ALTCHOICE

## 2019-02-06 RX ORDER — TIZANIDINE 4 MG/1
4 TABLET ORAL 3 TIMES DAILY
Qty: 30 TABLET | Refills: 0 | Status: SHIPPED | OUTPATIENT
Start: 2019-02-06 | End: 2019-02-27 | Stop reason: ALTCHOICE

## 2019-02-06 ASSESSMENT — ENCOUNTER SYMPTOMS
SHORTNESS OF BREATH: 0
ABDOMINAL PAIN: 0
BACK PAIN: 1
COUGH: 1
RHINORRHEA: 1
NAUSEA: 0

## 2019-02-06 ASSESSMENT — PATIENT HEALTH QUESTIONNAIRE - PHQ9
2. FEELING DOWN, DEPRESSED OR HOPELESS: 0
1. LITTLE INTEREST OR PLEASURE IN DOING THINGS: 0
SUM OF ALL RESPONSES TO PHQ QUESTIONS 1-9: 0
SUM OF ALL RESPONSES TO PHQ9 QUESTIONS 1 & 2: 0
SUM OF ALL RESPONSES TO PHQ QUESTIONS 1-9: 0

## 2019-02-27 ENCOUNTER — HOSPITAL ENCOUNTER (OUTPATIENT)
Age: 34
Discharge: HOME | End: 2019-02-27
Payer: COMMERCIAL

## 2019-02-27 ENCOUNTER — OFFICE VISIT (OUTPATIENT)
Dept: FAMILY MEDICINE CLINIC | Age: 34
End: 2019-02-27
Payer: COMMERCIAL

## 2019-02-27 ENCOUNTER — HOSPITAL ENCOUNTER (OUTPATIENT)
Dept: GENERAL RADIOLOGY | Age: 34
Discharge: HOME | End: 2019-02-27
Payer: COMMERCIAL

## 2019-02-27 VITALS
BODY MASS INDEX: 33.44 KG/M2 | WEIGHT: 208.1 LBS | DIASTOLIC BLOOD PRESSURE: 74 MMHG | HEART RATE: 80 BPM | RESPIRATION RATE: 16 BRPM | SYSTOLIC BLOOD PRESSURE: 128 MMHG | HEIGHT: 66 IN

## 2019-02-27 DIAGNOSIS — M53.3 SACROILIAC JOINT DYSFUNCTION OF RIGHT SIDE: ICD-10-CM

## 2019-02-27 DIAGNOSIS — M53.3 SACROILIAC JOINT DYSFUNCTION OF RIGHT SIDE: Primary | ICD-10-CM

## 2019-02-27 DIAGNOSIS — M54.10 RADICULAR PAIN OF RIGHT LOWER EXTREMITY: ICD-10-CM

## 2019-02-27 PROCEDURE — 72100 X-RAY EXAM L-S SPINE 2/3 VWS: CPT

## 2019-02-27 PROCEDURE — 99213 OFFICE O/P EST LOW 20 MIN: CPT | Performed by: NURSE PRACTITIONER

## 2019-02-27 PROCEDURE — G8417 CALC BMI ABV UP PARAM F/U: HCPCS | Performed by: NURSE PRACTITIONER

## 2019-02-27 PROCEDURE — G8484 FLU IMMUNIZE NO ADMIN: HCPCS | Performed by: NURSE PRACTITIONER

## 2019-02-27 PROCEDURE — 4004F PT TOBACCO SCREEN RCVD TLK: CPT | Performed by: NURSE PRACTITIONER

## 2019-02-27 PROCEDURE — G8427 DOCREV CUR MEDS BY ELIG CLIN: HCPCS | Performed by: NURSE PRACTITIONER

## 2019-02-27 RX ORDER — PREDNISONE 20 MG/1
20 TABLET ORAL 3 TIMES DAILY
Qty: 15 TABLET | Refills: 0 | Status: SHIPPED | OUTPATIENT
Start: 2019-02-27 | End: 2019-03-04

## 2019-02-27 ASSESSMENT — ENCOUNTER SYMPTOMS
ABDOMINAL PAIN: 0
COUGH: 0
SHORTNESS OF BREATH: 0
BACK PAIN: 1
NAUSEA: 0

## 2019-03-27 ENCOUNTER — OFFICE VISIT (OUTPATIENT)
Dept: FAMILY MEDICINE CLINIC | Age: 34
End: 2019-03-27
Payer: COMMERCIAL

## 2019-03-27 VITALS
RESPIRATION RATE: 16 BRPM | WEIGHT: 215.2 LBS | DIASTOLIC BLOOD PRESSURE: 86 MMHG | SYSTOLIC BLOOD PRESSURE: 136 MMHG | HEART RATE: 96 BPM | BODY MASS INDEX: 34.73 KG/M2

## 2019-03-27 DIAGNOSIS — F41.1 GAD (GENERALIZED ANXIETY DISORDER): ICD-10-CM

## 2019-03-27 DIAGNOSIS — E11.9 TYPE 2 DIABETES MELLITUS WITHOUT COMPLICATION, WITHOUT LONG-TERM CURRENT USE OF INSULIN (HCC): ICD-10-CM

## 2019-03-27 DIAGNOSIS — E78.2 MIXED HYPERLIPIDEMIA: ICD-10-CM

## 2019-03-27 DIAGNOSIS — E66.1 CLASS 1 DRUG-INDUCED OBESITY WITH SERIOUS COMORBIDITY AND BODY MASS INDEX (BMI) OF 34.0 TO 34.9 IN ADULT: Primary | ICD-10-CM

## 2019-03-27 DIAGNOSIS — E88.81 METABOLIC SYNDROME: ICD-10-CM

## 2019-03-27 PROCEDURE — G8427 DOCREV CUR MEDS BY ELIG CLIN: HCPCS | Performed by: NURSE PRACTITIONER

## 2019-03-27 PROCEDURE — G8417 CALC BMI ABV UP PARAM F/U: HCPCS | Performed by: NURSE PRACTITIONER

## 2019-03-27 PROCEDURE — 4004F PT TOBACCO SCREEN RCVD TLK: CPT | Performed by: NURSE PRACTITIONER

## 2019-03-27 PROCEDURE — G8484 FLU IMMUNIZE NO ADMIN: HCPCS | Performed by: NURSE PRACTITIONER

## 2019-03-27 PROCEDURE — 99214 OFFICE O/P EST MOD 30 MIN: CPT | Performed by: NURSE PRACTITIONER

## 2019-03-27 PROCEDURE — 3046F HEMOGLOBIN A1C LEVEL >9.0%: CPT | Performed by: NURSE PRACTITIONER

## 2019-03-27 PROCEDURE — 2022F DILAT RTA XM EVC RTNOPTHY: CPT | Performed by: NURSE PRACTITIONER

## 2019-03-27 RX ORDER — PHENTERMINE HYDROCHLORIDE 37.5 MG/1
37.5 CAPSULE ORAL EVERY MORNING
Qty: 30 CAPSULE | Refills: 0 | Status: SHIPPED | OUTPATIENT
Start: 2019-03-27 | End: 2019-04-24 | Stop reason: SDUPTHER

## 2019-03-27 ASSESSMENT — ENCOUNTER SYMPTOMS
ABDOMINAL PAIN: 0
NAUSEA: 0
COUGH: 0

## 2019-04-24 ENCOUNTER — OFFICE VISIT (OUTPATIENT)
Dept: FAMILY MEDICINE CLINIC | Age: 34
End: 2019-04-24
Payer: COMMERCIAL

## 2019-04-24 VITALS
DIASTOLIC BLOOD PRESSURE: 76 MMHG | WEIGHT: 209.5 LBS | BODY MASS INDEX: 33.81 KG/M2 | HEART RATE: 92 BPM | RESPIRATION RATE: 16 BRPM | SYSTOLIC BLOOD PRESSURE: 122 MMHG

## 2019-04-24 DIAGNOSIS — E66.1 CLASS 1 DRUG-INDUCED OBESITY WITH SERIOUS COMORBIDITY AND BODY MASS INDEX (BMI) OF 34.0 TO 34.9 IN ADULT: Primary | ICD-10-CM

## 2019-04-24 DIAGNOSIS — E11.9 TYPE 2 DIABETES MELLITUS WITHOUT COMPLICATION, WITHOUT LONG-TERM CURRENT USE OF INSULIN (HCC): ICD-10-CM

## 2019-04-24 DIAGNOSIS — E78.2 MIXED HYPERLIPIDEMIA: ICD-10-CM

## 2019-04-24 DIAGNOSIS — E88.81 METABOLIC SYNDROME: ICD-10-CM

## 2019-04-24 PROCEDURE — 99213 OFFICE O/P EST LOW 20 MIN: CPT | Performed by: NURSE PRACTITIONER

## 2019-04-24 PROCEDURE — 2022F DILAT RTA XM EVC RTNOPTHY: CPT | Performed by: NURSE PRACTITIONER

## 2019-04-24 PROCEDURE — G8417 CALC BMI ABV UP PARAM F/U: HCPCS | Performed by: NURSE PRACTITIONER

## 2019-04-24 PROCEDURE — 4004F PT TOBACCO SCREEN RCVD TLK: CPT | Performed by: NURSE PRACTITIONER

## 2019-04-24 PROCEDURE — G8427 DOCREV CUR MEDS BY ELIG CLIN: HCPCS | Performed by: NURSE PRACTITIONER

## 2019-04-24 PROCEDURE — 3046F HEMOGLOBIN A1C LEVEL >9.0%: CPT | Performed by: NURSE PRACTITIONER

## 2019-04-24 RX ORDER — PHENTERMINE HYDROCHLORIDE 37.5 MG/1
37.5 CAPSULE ORAL EVERY MORNING
Qty: 30 CAPSULE | Refills: 0 | Status: SHIPPED | OUTPATIENT
Start: 2019-04-24 | End: 2019-05-24

## 2019-04-24 ASSESSMENT — ENCOUNTER SYMPTOMS
ABDOMINAL PAIN: 0
NAUSEA: 0
COUGH: 0

## 2019-04-24 NOTE — PATIENT INSTRUCTIONS
You may receive a survey about your visit with us today. The feedback from our patients helps us identify what is working well and where the service to all patients can be enhanced. Thank you! Tobacco Cessation Programs     Telephonic behavior modification  Leon Cynthia ([phone])   Counseling service for those who are ready to quit using tobacco.     Available for uninsured [place] residents, [place] recipients, pregnant women, or patients whose health plans or employers are members of the [address] [place] [address] behavior modification   http://[place]. Quitlogix. org   Online support program to help patients through each step of the quitting process. Available 24 hours a day 7 days a week. Provides up to date researched based tool, step-by-step guides, and motivational messages. Online behavior modification   www.lungusa.org/stop-smoking/how-to-quit   HelpLine: 1-800-LUNG-[place] (501-7407)   Email questions to:  [email or web address] PPT [employer]    Website offers resources to help tobacco users figure out their reasons for quitting and then take the big step of quitting for good. Hypnosis   Location: [facility], [facility], [place]   Contact: Christin Parker, PhD at [phone]   Hypnosis for tobacco cessation   Cost $225 for the initial session and $175 for each session afterwards. Most patients require 6-8 sessions. There is the option to submit through the patients insurance. Hypnosis and behavior modification   Location: [place] [address],  [address] [facility], [place]   Contact: Navjot Jacques, PhD at [phone]  [facility] and hypnosis for nicotine addition   Cost: For uninsured patients:  Please call above phone number  Cost for insured patients depends on patients insurance plan.     Behavior modification   Location: [facility], [address] [facility], [address]: [place] [address] include four one-on-one appointments between the patient and a respiratory therapist.  The four appointments span over three weeks. The respiratory therapist schedules one of the appointments to occur 48 hours after the patients quit date.  Cost $100 total for the four sessions.   Tobacco cessation products are not included in the cost and are not provided by [facility].     [place]

## 2019-04-24 NOTE — PROGRESS NOTES
Subjective:      Patient ID: Reine Duane is a 29 y.o. female. HPI  : 3 month Follow Up    Chief Complaint   Patient presents with    1 Month Follow-Up    Obesity    Medication Refill       Adipex #1. Lost 6#. Went [place] in middle of month. Was unable to cut out pop yet. Increase in activity. Started out work out regime last week. Lowest weight was 198#    Body mass index is 33.81 kg/m². Wt Readings from Last 3 Encounters:   04/24/19 209 lb 8 oz (95 kg)   03/27/19 215 lb 3.2 oz (97.6 kg)   02/27/19 208 lb 1.6 oz (94.4 kg)       Patient Active Problem List   Diagnosis    Asthma with bronchitis    Diabetes mellitus, type 2 ([address]    Hyperlipidemia    Class 1 obesity due to excess calories with serious comorbidity and body mass index (BMI) of 32.0 to 32.9 in adult    Metabolic syndrome       Denies CP, SOB or chest tightness    BP wnl. No ACE at this time. Microalbuminuria NEG. BP Readings from Last 3 Encounters:   04/24/19 122/76   03/27/19 136/86   02/27/19 128/74     On Metformin 1000 mg BID, Tradjenta 5 mg and Bydureon. Intolerance to GLP-1. Instructed on diet and lifestyle changes last visit. No benefit. Lab Results   Component Value Date    LABA1C 5.9 11/05/2018    LABA1C 6.9 05/29/2018    LABA1C 7.2 (H) 01/22/2018     No results found for: EAG      On Lipitor 20 mg. Tolerating well. LIPIDS well controlled.   Hysterectomy    No components found for: CHLPL  Lab Results   Component Value Date    TRIG 59 11/05/2018    TRIG 106 01/22/2018    TRIG 117 03/22/2017     Lab Results   Component Value Date    HDL 43 11/05/2018    HDL 37 01/22/2018    HDL 36 03/22/2017     Lab Results   Component Value Date    LDLCALC 63 11/05/2018    LDLCALC 89 01/22/2018    LDLCALC 71 03/22/2017     No results found for: LABVLDL      Chemistry        Component Value Date/Time     03/22/2017 1127    K 4.0 03/22/2017 1127     03/22/2017 1127    CO2 24 03/22/2017 1127    BUN 8 rate, regular rhythm, S1 normal, S2 normal, normal heart sounds and normal pulses. Exam reveals no S3. No murmur heard. Pulmonary/Chest: Effort normal and breath sounds normal. She has no decreased breath sounds. She has no wheezes. She has no rhonchi. Abdominal: Soft. Bowel sounds are normal. There is no tenderness. Neurological: She is alert and oriented to person, place, and time. Psychiatric: She has a normal mood and affect. Her behavior is normal.       Assessment:       Diagnosis Orders   1. Class 1 drug-induced obesity with serious comorbidity and body mass index (BMI) of 34.0 to 34.9 in adult  phentermine (ADIPEX-P) 37.5 MG capsule   2. Type 2 diabetes mellitus without complication, without long-term current use of insulin ([facility]     3. Mixed hyperlipidemia     4. Metabolic syndrome             Plan:       Lost 6#  Adipex #2  Diet and exercise  RTO in 1 month      Fedora Toño received counseling on the following healthy behaviors: nutrition and exercise  Reviewed prior labs and health maintenance  Continue current medications, diet and exercise. Discussed use, benefit, and side effects of prescribed medications. Barriers to medication compliance addressed. Patient given educational materials - see patient instructions  Was a self-tracking handout given in paper form or via [employer]? No:     Requested Prescriptions     Signed Prescriptions Disp Refills    phentermine (ADIPEX-P) 37.5 MG capsule 30 capsule 0     Sig: Take 1 capsule by mouth every morning for 30 days. All patient questions answered. Patient voiced understanding. Quality Measures    Body mass index is 33.81 kg/m². Elevated. Weight control planned discussed Healthy diet and regular exercise. BP: 122/76 Blood pressure is normal. Treatment plan consists of No treatment change needed.     Lab Results   Component Value Date    LDLCALC 63 11/05/2018    (goal LDL reduction with dx if diabetes is 50% LDL reduction)      PHQ Scores 2/6/2019 5/29/2018 3/27/2017   PHQ2 Score 0 0 0   PHQ9 Score 0 0 0     Interpretation of Total Score Depression Severity: 1-4 = Minimal depression, 5-9 = Mild depression, 10-14 = Moderate depression, 15-19 = Moderately severe depression, 20-27 = Severe depression

## 2019-05-02 ENCOUNTER — OFFICE VISIT (OUTPATIENT)
Dept: FAMILY MEDICINE CLINIC | Age: 34
End: 2019-05-02
Payer: COMMERCIAL

## 2019-05-02 VITALS
SYSTOLIC BLOOD PRESSURE: 132 MMHG | HEIGHT: 66 IN | OXYGEN SATURATION: 98 % | TEMPERATURE: 98 F | WEIGHT: 206.3 LBS | BODY MASS INDEX: 33.16 KG/M2 | DIASTOLIC BLOOD PRESSURE: 80 MMHG | RESPIRATION RATE: 16 BRPM | HEART RATE: 80 BPM

## 2019-05-02 DIAGNOSIS — E11.9 TYPE 2 DIABETES MELLITUS WITHOUT COMPLICATION, WITHOUT LONG-TERM CURRENT USE OF INSULIN (HCC): ICD-10-CM

## 2019-05-02 DIAGNOSIS — B97.89 VIRAL SINUSITIS: Primary | ICD-10-CM

## 2019-05-02 DIAGNOSIS — J32.9 VIRAL SINUSITIS: Primary | ICD-10-CM

## 2019-05-02 PROCEDURE — 4004F PT TOBACCO SCREEN RCVD TLK: CPT | Performed by: NURSE PRACTITIONER

## 2019-05-02 PROCEDURE — 3046F HEMOGLOBIN A1C LEVEL >9.0%: CPT | Performed by: NURSE PRACTITIONER

## 2019-05-02 PROCEDURE — G8417 CALC BMI ABV UP PARAM F/U: HCPCS | Performed by: NURSE PRACTITIONER

## 2019-05-02 PROCEDURE — 99213 OFFICE O/P EST LOW 20 MIN: CPT | Performed by: NURSE PRACTITIONER

## 2019-05-02 PROCEDURE — 2022F DILAT RTA XM EVC RTNOPTHY: CPT | Performed by: NURSE PRACTITIONER

## 2019-05-02 PROCEDURE — G8427 DOCREV CUR MEDS BY ELIG CLIN: HCPCS | Performed by: NURSE PRACTITIONER

## 2019-05-02 RX ORDER — PREDNISONE 20 MG/1
20 TABLET ORAL 2 TIMES DAILY
Qty: 10 TABLET | Refills: 0 | Status: SHIPPED | OUTPATIENT
Start: 2019-05-02 | End: 2019-05-07

## 2019-05-02 ASSESSMENT — ENCOUNTER SYMPTOMS
SINUS PAIN: 1
SINUS PRESSURE: 1
ABDOMINAL PAIN: 0
NAUSEA: 0
COUGH: 1
SHORTNESS OF BREATH: 0
RHINORRHEA: 1

## 2019-05-02 NOTE — PROGRESS NOTES
Subjective:      Patient ID: Deanna Linares is a 29 y.o. female. HPI: Acute for congestion    Chief Complaint   Patient presents with    Head Congestion     s/s x 4 days, no OTC meds taken    Cough    Chest Congestion    Ear Fullness     bilateral       Onset of 4 days with symptoms as above. Muffled hearing in ears. Ear pain. Sinus pressure. Cough that is productive. Denies CP, SOB or chest tightness. Body aches. No fevers. OTC: IBU    URI going through the family. Vitals:    05/02/19 0819   BP: 132/80   Pulse: 80   Resp: 16   Temp: 98 °F (36.7 °C)   SpO2: 98%       Lab Results   Component Value Date    LABA1C 5.9 11/05/2018     No results found for: EAG    Patient Active Problem List   Diagnosis    Asthma with bronchitis    Diabetes mellitus, type 2 (HCC)    Hyperlipidemia    Class 1 obesity due to excess calories with serious comorbidity and body mass index (BMI) of 32.0 to 32.9 in adult    Metabolic syndrome       Review of Systems   Constitutional: Positive for fatigue. Negative for chills and fever. HENT: Positive for congestion, ear pain, hearing loss, postnasal drip, rhinorrhea, sinus pressure and sinus pain. Respiratory: Positive for cough. Negative for shortness of breath. Cardiovascular: Negative for chest pain. Gastrointestinal: Negative for abdominal pain and nausea. Skin: Negative for rash. Neurological: Positive for headaches. Negative for dizziness and light-headedness. Psychiatric/Behavioral: Negative. Objective:   Physical Exam   Constitutional: Vital signs are normal. No distress. HENT:   Right Ear: Tympanic membrane is bulging. Tympanic membrane is not erythematous. A middle ear effusion is present. Left Ear: Tympanic membrane is bulging. Tympanic membrane is not erythematous. A middle ear effusion is present. Nose: Mucosal edema and rhinorrhea present. Right sinus exhibits maxillary sinus tenderness.  Left sinus exhibits maxillary sinus tenderness. Mouth/Throat: Posterior oropharyngeal edema present. Eyes: Pupils are equal, round, and reactive to light. EOM are normal.   Neck: Normal range of motion. Neck supple. Cardiovascular: Normal rate and regular rhythm. No murmur heard. Pulmonary/Chest: Effort normal and breath sounds normal. She has no wheezes. Abdominal: Soft. Bowel sounds are normal. She exhibits no distension. There is no tenderness. Musculoskeletal: Normal range of motion. She exhibits no tenderness. Skin: Skin is warm and dry. No rash noted. Assessment:       Diagnosis Orders   1. Viral sinusitis  predniSONE (DELTASONE) 20 MG tablet   2.  Type 2 diabetes mellitus without complication, without long-term current use of insulin (HCC)             Plan:      Viral nature of symptoms discussed  Symptomatic Care - orders as above  Sudafed OTC  Increase fluids and rest  RTO if symptoms worsen or stay the same            SUNNY Fabian - CNP

## 2019-05-02 NOTE — PROGRESS NOTES
Visit Information    Have you changed or started any medications since your last visit including any over-the-counter medicines, vitamins, or herbal medicines? no   Are you having any side effects from any of your medications? -  no  Have you stopped taking any of your medications? Is so, why? -  no    Have you seen any other physician or provider since your last visit? No  Have you had any other diagnostic tests since your last visit? No  Have you been seen in the emergency room and/or had an admission to a hospital since we last saw you? No  Have you had your routine dental cleaning in the past 6 months? n/a    Have you activated your [employer] account? If not, what are your barriers?  Yes     Patient Care Team:  SUNNY Ny - CNP as PCP - General    Medical History Review  Past Medical, Family, and Social History reviewed and does not contribute to the patient presenting condition    Health Maintenance   Topic Date Due    Pneumococcal 0-64 years Vaccine (1 of 1 - PPSV23) 01/15/1991    Varicella Vaccine (1 of 2 - 13+ 2-dose series) 01/15/1998    Hepatitis B Vaccine (1 of 3 - Risk 3-dose series) 01/15/2004    DTaP/Tdap/Td vaccine (1 - Tdap) 01/15/2004    Diabetic microalbuminuria test  09/27/2018    Cervical cancer screen  01/08/2019    Flu vaccine (Season Ended) 09/01/2019    A1C test (Diabetic or Prediabetic)  11/05/2019    Lipid screen  11/05/2019    Diabetic foot exam  01/24/2020    Diabetic retinal exam  03/18/2020    HIV screen  Addressed

## 2019-05-02 NOTE — PATIENT INSTRUCTIONS
You may receive a survey about your visit with us today. The feedback from our patients helps us identify what is working well and where the service to all patients can be enhanced. Thank you! Tobacco Cessation Programs     Telephonic behavior modification  Willam Brewster ([phone])   Counseling service for those who are ready to quit using tobacco.     Available for uninsured [place] residents, [place] recipients, pregnant women, or patients whose health plans or employers are members of the [address] [place] [address] behavior modification   http://[place]. Quitlogix. org   Online support program to help patients through each step of the quitting process. Available 24 hours a day 7 days a week. Provides up to date researched based tool, step-by-step guides, and motivational messages. Online behavior modification   www.lungusa.org/stop-smoking/how-to-quit   HelpLine: [facility]UNG[facility] (449-0916)   Email questions to:  [email or web address]. [employer]    Website offers resources to help tobacco users figure out their reasons for quitting and then take the big step of quitting for good. Hypnosis   Location: [address], [facility], [place]   Contact: Zachery Sandifer, PhD at [phone]   Hypnosis for tobacco cessation   Cost $225 for the initial session and $175 for each session afterwards. Most patients require 6-8 sessions. There is the option to submit through the patients insurance. Hypnosis and behavior modification   Location: [place] [address],  [address] [facility], [place]   Contact: Lenora Meckel, PhD at [phone]  [address] [place] [address] Counseling and hypnosis for nicotine addition   Cost: For uninsured patients:  Please call above phone number  Cost for insured patients depends on patients insurance plan.     Behavior modification   Location: [facility], [address] [facility], [address]: [place] [address] include four one-on-one appointments between the patient and a respiratory therapist.  The four appointments span over three weeks. The respiratory therapist schedules one of the appointments to occur 48 hours after the patients quit date.  Cost $100 total for the four sessions.   Tobacco cessation products are not included in the cost and are not provided by [facility].     Mel Cerda

## 2019-05-23 ENCOUNTER — PATIENT MESSAGE (OUTPATIENT)
Dept: FAMILY MEDICINE CLINIC | Age: 34
End: 2019-05-23

## 2019-07-24 ENCOUNTER — HOSPITAL ENCOUNTER (EMERGENCY)
Age: 34
Discharge: HOME | End: 2019-07-24
Attending: EMERGENCY MEDICINE
Payer: COMMERCIAL

## 2019-07-24 VITALS
HEART RATE: 99 BPM | BODY MASS INDEX: 33.59 KG/M2 | RESPIRATION RATE: 20 BRPM | SYSTOLIC BLOOD PRESSURE: 149 MMHG | WEIGHT: 209 LBS | HEIGHT: 66 IN | OXYGEN SATURATION: 96 % | DIASTOLIC BLOOD PRESSURE: 89 MMHG | TEMPERATURE: 98.3 F

## 2019-07-24 DIAGNOSIS — F17.200 TOBACCO USE DISORDER: ICD-10-CM

## 2019-07-24 DIAGNOSIS — J02.9 ACUTE PHARYNGITIS, UNSPECIFIED ETIOLOGY: ICD-10-CM

## 2019-07-24 DIAGNOSIS — J01.00 ACUTE MAXILLARY SINUSITIS, RECURRENCE NOT SPECIFIED: ICD-10-CM

## 2019-07-24 DIAGNOSIS — E11.69 DIABETES MELLITUS TYPE 2 IN OBESE (HCC): ICD-10-CM

## 2019-07-24 DIAGNOSIS — H66.91 ACUTE RIGHT OTITIS MEDIA: Primary | ICD-10-CM

## 2019-07-24 DIAGNOSIS — E66.9 DIABETES MELLITUS TYPE 2 IN OBESE (HCC): ICD-10-CM

## 2019-07-24 PROCEDURE — 99212 OFFICE O/P EST SF 10 MIN: CPT

## 2019-07-24 PROCEDURE — 99213 OFFICE O/P EST LOW 20 MIN: CPT | Performed by: EMERGENCY MEDICINE

## 2019-07-24 RX ORDER — IBUPROFEN 600 MG/1
600 TABLET ORAL 3 TIMES DAILY PRN
Qty: 15 TABLET | Refills: 0 | Status: SHIPPED | OUTPATIENT
Start: 2019-07-24 | End: 2020-11-19

## 2019-07-24 RX ORDER — AMOXICILLIN AND CLAVULANATE POTASSIUM 875; 125 MG/1; MG/1
1 TABLET, FILM COATED ORAL 2 TIMES DAILY
Qty: 20 TABLET | Refills: 0 | Status: SHIPPED | OUTPATIENT
Start: 2019-07-24 | End: 2019-08-03

## 2019-07-24 RX ORDER — TRAMADOL HYDROCHLORIDE 50 MG/1
50 TABLET ORAL EVERY 4 HOURS PRN
Qty: 12 TABLET | Refills: 0 | Status: SHIPPED | OUTPATIENT
Start: 2019-07-24 | End: 2019-07-26

## 2019-07-24 ASSESSMENT — ENCOUNTER SYMPTOMS
SINUS PAIN: 1
VOMITING: 0
SORE THROAT: 1
EYE PAIN: 0
COUGH: 0
EYE DISCHARGE: 0
CONSTIPATION: 0
STRIDOR: 0
BACK PAIN: 0
CHOKING: 0
DIARRHEA: 0
RHINORRHEA: 1
SINUS PRESSURE: 1
BLOOD IN STOOL: 0
FACIAL SWELLING: 0
SHORTNESS OF BREATH: 0
EYE REDNESS: 0
TROUBLE SWALLOWING: 0
VOICE CHANGE: 0
ABDOMINAL PAIN: 0
NAUSEA: 0
WHEEZING: 0

## 2019-07-24 ASSESSMENT — PAIN DESCRIPTION - LOCATION: LOCATION: EAR

## 2019-07-24 ASSESSMENT — PAIN DESCRIPTION - DESCRIPTORS: DESCRIPTORS: ACHING

## 2019-07-24 ASSESSMENT — PAIN SCALES - GENERAL: PAINLEVEL_OUTOF10: 8

## 2019-07-24 ASSESSMENT — PAIN - FUNCTIONAL ASSESSMENT: PAIN_FUNCTIONAL_ASSESSMENT: ACTIVITIES ARE NOT PREVENTED

## 2019-07-24 ASSESSMENT — PAIN DESCRIPTION - PROGRESSION: CLINICAL_PROGRESSION: GRADUALLY WORSENING

## 2019-07-24 ASSESSMENT — PAIN DESCRIPTION - PAIN TYPE: TYPE: ACUTE PAIN

## 2019-07-24 ASSESSMENT — PAIN DESCRIPTION - ONSET: ONSET: GRADUAL

## 2019-07-24 ASSESSMENT — PAIN DESCRIPTION - ORIENTATION: ORIENTATION: LEFT

## 2019-07-24 ASSESSMENT — PAIN DESCRIPTION - FREQUENCY: FREQUENCY: CONTINUOUS

## 2019-07-24 NOTE — ED PROVIDER NOTES
[address]       Chief Complaint   Patient presents with    Headache    Nasal Congestion    Otalgia    Sinusitis       Nurses Notes reviewed and I agree except as noted in the HPI. HISTORY OF PRESENT ILLNESS   Paulette Espinal is a 29 y.o. female who presents with 3-day history of sore throat, right ear pain, sinus congestion, postnasal drainage, fatigue, malaise. No chest pain, shortness of breath, abdominal pain, fever, vomiting, dizziness, syncope. History of diabetes, not measuring blood sugars. Status post tonsillectomy. Smokes cigarettes. REVIEW OF SYSTEMS     Review of Systems   Constitutional: Positive for appetite change and fatigue. Negative for chills, fever and unexpected weight change. HENT: Positive for congestion, ear pain, postnasal drip, rhinorrhea, sinus pressure, sinus pain and sore throat. Negative for ear discharge, facial swelling, hearing loss, nosebleeds, trouble swallowing and voice change. Eyes: Negative for pain, discharge, redness and visual disturbance. Respiratory: Negative for cough, choking, shortness of breath, wheezing and stridor. Cardiovascular: Negative for chest pain and leg swelling. Gastrointestinal: Negative for abdominal pain, blood in stool, constipation, diarrhea, nausea and vomiting. Genitourinary: Negative for dysuria, flank pain, frequency, hematuria, urgency, vaginal bleeding and vaginal discharge. Musculoskeletal: Negative for arthralgias, back pain, neck pain and neck stiffness. Skin: Negative for rash. Neurological: Negative for dizziness, seizures, syncope, weakness, light-headedness and headaches. Hematological: Negative for adenopathy. Does not bruise/bleed easily. Psychiatric/Behavioral: Negative for confusion, sleep disturbance and suicidal ideas. The patient is not nervous/anxious. All other systems reviewed and are negative.       PAST MEDICAL HISTORY

## 2019-07-25 ENCOUNTER — TELEPHONE (OUTPATIENT)
Dept: FAMILY MEDICINE CLINIC | Age: 34
End: 2019-07-25

## 2019-07-31 ENCOUNTER — PATIENT MESSAGE (OUTPATIENT)
Dept: FAMILY MEDICINE CLINIC | Age: 34
End: 2019-07-31

## 2019-07-31 DIAGNOSIS — N76.0 ACUTE VAGINITIS: Primary | ICD-10-CM

## 2019-07-31 RX ORDER — FLUCONAZOLE 150 MG/1
150 TABLET ORAL ONCE
Qty: 2 TABLET | Refills: 0 | Status: SHIPPED | OUTPATIENT
Start: 2019-07-31 | End: 2019-07-31

## 2019-07-31 NOTE — TELEPHONE ENCOUNTER
From: Josafat Rees  To: SUNNY Reyes - CNP  Sent: 7/31/2019 10:26 AM EDT  Subject: Prescription Question    I was put on an antibiotic when I went to the ED and it has given me a yeast infection. Could you send in a script for me please? Thank you.

## 2019-10-09 ENCOUNTER — OFFICE VISIT (OUTPATIENT)
Dept: SURGERY | Age: 34
End: 2019-10-09
Payer: COMMERCIAL

## 2019-10-09 ENCOUNTER — PREP FOR PROCEDURE (OUTPATIENT)
Dept: SURGERY | Age: 34
End: 2019-10-09

## 2019-10-09 VITALS
RESPIRATION RATE: 18 BRPM | TEMPERATURE: 98.6 F | WEIGHT: 211.6 LBS | SYSTOLIC BLOOD PRESSURE: 130 MMHG | OXYGEN SATURATION: 96 % | BODY MASS INDEX: 34.01 KG/M2 | HEART RATE: 116 BPM | HEIGHT: 66 IN | DIASTOLIC BLOOD PRESSURE: 80 MMHG

## 2019-10-09 DIAGNOSIS — K64.4 EXTERNAL HEMORRHOIDS: Primary | ICD-10-CM

## 2019-10-09 DIAGNOSIS — Z01.818 PRE-OP TESTING: ICD-10-CM

## 2019-10-09 DIAGNOSIS — E11.9 TYPE 2 DIABETES MELLITUS WITHOUT COMPLICATION, WITHOUT LONG-TERM CURRENT USE OF INSULIN (HCC): ICD-10-CM

## 2019-10-09 DIAGNOSIS — K62.5 ANAL BLEEDING: ICD-10-CM

## 2019-10-09 PROCEDURE — 3046F HEMOGLOBIN A1C LEVEL >9.0%: CPT | Performed by: SURGERY

## 2019-10-09 PROCEDURE — G8427 DOCREV CUR MEDS BY ELIG CLIN: HCPCS | Performed by: SURGERY

## 2019-10-09 PROCEDURE — G8484 FLU IMMUNIZE NO ADMIN: HCPCS | Performed by: SURGERY

## 2019-10-09 PROCEDURE — 2022F DILAT RTA XM EVC RTNOPTHY: CPT | Performed by: SURGERY

## 2019-10-09 PROCEDURE — 99203 OFFICE O/P NEW LOW 30 MIN: CPT | Performed by: SURGERY

## 2019-10-09 PROCEDURE — G8417 CALC BMI ABV UP PARAM F/U: HCPCS | Performed by: SURGERY

## 2019-10-09 PROCEDURE — 4004F PT TOBACCO SCREEN RCVD TLK: CPT | Performed by: SURGERY

## 2019-10-09 RX ORDER — SODIUM CHLORIDE 9 MG/ML
INJECTION, SOLUTION INTRAVENOUS CONTINUOUS
Status: CANCELLED | OUTPATIENT
Start: 2019-10-09

## 2019-10-13 DIAGNOSIS — E11.9 TYPE 2 DIABETES MELLITUS WITHOUT COMPLICATION, WITHOUT LONG-TERM CURRENT USE OF INSULIN (HCC): ICD-10-CM

## 2019-10-16 ENCOUNTER — OFFICE VISIT (OUTPATIENT)
Dept: FAMILY MEDICINE CLINIC | Age: 34
End: 2019-10-16
Payer: COMMERCIAL

## 2019-10-16 VITALS
HEART RATE: 96 BPM | BODY MASS INDEX: 34.4 KG/M2 | DIASTOLIC BLOOD PRESSURE: 86 MMHG | WEIGHT: 213.1 LBS | RESPIRATION RATE: 18 BRPM | TEMPERATURE: 97.4 F | SYSTOLIC BLOOD PRESSURE: 134 MMHG

## 2019-10-16 DIAGNOSIS — J31.0 RHINOSINUSITIS: ICD-10-CM

## 2019-10-16 DIAGNOSIS — E11.9 TYPE 2 DIABETES MELLITUS WITHOUT COMPLICATION, WITHOUT LONG-TERM CURRENT USE OF INSULIN (HCC): ICD-10-CM

## 2019-10-16 DIAGNOSIS — J32.9 RHINOSINUSITIS: ICD-10-CM

## 2019-10-16 DIAGNOSIS — H69.83 ETD (EUSTACHIAN TUBE DYSFUNCTION), BILATERAL: Primary | ICD-10-CM

## 2019-10-16 DIAGNOSIS — F41.1 GAD (GENERALIZED ANXIETY DISORDER): ICD-10-CM

## 2019-10-16 DIAGNOSIS — E78.2 MIXED HYPERLIPIDEMIA: ICD-10-CM

## 2019-10-16 PROCEDURE — G8417 CALC BMI ABV UP PARAM F/U: HCPCS | Performed by: NURSE PRACTITIONER

## 2019-10-16 PROCEDURE — 99213 OFFICE O/P EST LOW 20 MIN: CPT | Performed by: NURSE PRACTITIONER

## 2019-10-16 PROCEDURE — G8484 FLU IMMUNIZE NO ADMIN: HCPCS | Performed by: NURSE PRACTITIONER

## 2019-10-16 PROCEDURE — 3046F HEMOGLOBIN A1C LEVEL >9.0%: CPT | Performed by: NURSE PRACTITIONER

## 2019-10-16 PROCEDURE — 4004F PT TOBACCO SCREEN RCVD TLK: CPT | Performed by: NURSE PRACTITIONER

## 2019-10-16 PROCEDURE — 2022F DILAT RTA XM EVC RTNOPTHY: CPT | Performed by: NURSE PRACTITIONER

## 2019-10-16 PROCEDURE — G8427 DOCREV CUR MEDS BY ELIG CLIN: HCPCS | Performed by: NURSE PRACTITIONER

## 2019-10-16 RX ORDER — FLUTICASONE PROPIONATE 50 MCG
2 SPRAY, SUSPENSION (ML) NASAL 2 TIMES DAILY
Qty: 1 BOTTLE | Refills: 0 | Status: SHIPPED | OUTPATIENT
Start: 2019-10-16 | End: 2020-01-07

## 2019-10-16 RX ORDER — VENLAFAXINE HYDROCHLORIDE 150 MG/1
150 CAPSULE, EXTENDED RELEASE ORAL DAILY
Qty: 90 CAPSULE | Refills: 3 | Status: SHIPPED | OUTPATIENT
Start: 2019-10-16 | End: 2020-06-29 | Stop reason: SDUPTHER

## 2019-10-16 ASSESSMENT — ENCOUNTER SYMPTOMS
WHEEZING: 0
COUGH: 1
SINUS PRESSURE: 1
CHEST TIGHTNESS: 1
SORE THROAT: 1

## 2019-10-19 ASSESSMENT — ENCOUNTER SYMPTOMS
EYE REDNESS: 0
CONSTIPATION: 0
WHEEZING: 0
STRIDOR: 0
ANAL BLEEDING: 1
ABDOMINAL PAIN: 0
BACK PAIN: 0
RHINORRHEA: 0
VOMITING: 0
EYE DISCHARGE: 0
NAUSEA: 0
FACIAL SWELLING: 0
COUGH: 0
COLOR CHANGE: 0
ALLERGIC/IMMUNOLOGIC NEGATIVE: 1
SHORTNESS OF BREATH: 0
BLOOD IN STOOL: 0
CHOKING: 0
EYE ITCHING: 0
CHEST TIGHTNESS: 0
APNEA: 0
VOICE CHANGE: 0
EYE PAIN: 0
SINUS PRESSURE: 0
RECTAL PAIN: 0
DIARRHEA: 0
ABDOMINAL DISTENTION: 0
TROUBLE SWALLOWING: 0
PHOTOPHOBIA: 0
SORE THROAT: 0

## 2019-11-08 LAB
ANION GAP SERPL CALCULATED.3IONS-SCNC: 8 MMOL/L (ref 4–12)
BUN BLDV-MCNC: 7 MG/DL (ref 7–20)
CALCIUM SERPL-MCNC: 9.1 MG/DL (ref 8.8–10.5)
CHLORIDE BLD-SCNC: 105 MEQ/L (ref 101–111)
CHOLESTEROL/HDL RELATIVE RISK: 3.3 (ref 4–4.4)
CHOLESTEROL: 144 MG/DL
CO2: 28 MEQ/L (ref 21–32)
CREAT SERPL-MCNC: 0.7 MG/DL (ref 0.6–1.3)
CREATININE CLEARANCE: >60
CREATININE, RANDOM URINE: 416.7 MG/DL
DIRECT-LDL / HDL RISK: 2.3
ESTIMATED AVERAGE GLUCOSE: 146 MG/DL
GLUCOSE, FASTING: 110 MG/DL (ref 70–110)
HBA1C MFR BLD: 6.7 % (ref 4.4–6.4)
HCT VFR BLD CALC: 45.7 % (ref 35–44)
HDLC SERPL-MCNC: 43 MG/DL
HEMOGLOBIN: 15.1 GM/DL (ref 12–15)
LDL CHOLESTEROL DIRECT: 99 MG/DL
MICROALBUMIN UR-MCNC: 421.6 MG/L
MICROALBUMIN/CREAT UR-RTO: 100.8 MG/GM (ref 0–30)
POTASSIUM SERPL-SCNC: 3.8 MEQ/L (ref 3.6–5)
SODIUM BLD-SCNC: 141 MEQ/L (ref 135–145)
TRIGL SERPL-MCNC: 125 MG/DL
VLDLC SERPL CALC-MCNC: 25 MG/DL

## 2019-11-19 ENCOUNTER — ANESTHESIA EVENT (OUTPATIENT)
Dept: OPERATING ROOM | Age: 34
End: 2019-11-19
Payer: COMMERCIAL

## 2019-11-19 ENCOUNTER — HOSPITAL ENCOUNTER (OUTPATIENT)
Age: 34
Setting detail: OUTPATIENT SURGERY
Discharge: HOME | End: 2019-11-19
Attending: SURGERY | Admitting: SURGERY
Payer: COMMERCIAL

## 2019-11-19 ENCOUNTER — ANESTHESIA (OUTPATIENT)
Dept: OPERATING ROOM | Age: 34
End: 2019-11-19
Payer: COMMERCIAL

## 2019-11-19 VITALS
TEMPERATURE: 98 F | OXYGEN SATURATION: 95 % | BODY MASS INDEX: 34.07 KG/M2 | WEIGHT: 212 LBS | DIASTOLIC BLOOD PRESSURE: 67 MMHG | SYSTOLIC BLOOD PRESSURE: 124 MMHG | HEIGHT: 66 IN | HEART RATE: 96 BPM | RESPIRATION RATE: 18 BRPM

## 2019-11-19 VITALS
TEMPERATURE: 97.9 F | DIASTOLIC BLOOD PRESSURE: 47 MMHG | SYSTOLIC BLOOD PRESSURE: 94 MMHG | RESPIRATION RATE: 1 BRPM | OXYGEN SATURATION: 97 %

## 2019-11-19 DIAGNOSIS — K64.4 HEMORRHOIDS, EXTERNAL: Primary | ICD-10-CM

## 2019-11-19 LAB
GLUCOSE BLD-MCNC: 110 MG/DL (ref 70–108)
GLUCOSE BLD-MCNC: 95 MG/DL (ref 70–108)

## 2019-11-19 PROCEDURE — 82948 REAGENT STRIP/BLOOD GLUCOSE: CPT

## 2019-11-19 PROCEDURE — 3600007511: Performed by: SURGERY

## 2019-11-19 PROCEDURE — 2580000003 HC RX 258

## 2019-11-19 PROCEDURE — 6370000000 HC RX 637 (ALT 250 FOR IP): Performed by: SURGERY

## 2019-11-19 PROCEDURE — 2709999900 HC NON-CHARGEABLE SUPPLY: Performed by: SURGERY

## 2019-11-19 PROCEDURE — 7100000010 HC PHASE II RECOVERY - FIRST 15 MIN: Performed by: SURGERY

## 2019-11-19 PROCEDURE — 2500000003 HC RX 250 WO HCPCS: Performed by: REGISTERED NURSE

## 2019-11-19 PROCEDURE — 45378 DIAGNOSTIC COLONOSCOPY: CPT | Performed by: SURGERY

## 2019-11-19 PROCEDURE — 3600007501: Performed by: SURGERY

## 2019-11-19 PROCEDURE — 3700000000 HC ANESTHESIA ATTENDED CARE: Performed by: SURGERY

## 2019-11-19 PROCEDURE — 6360000002 HC RX W HCPCS: Performed by: PHARMACIST

## 2019-11-19 PROCEDURE — 6360000002 HC RX W HCPCS: Performed by: REGISTERED NURSE

## 2019-11-19 PROCEDURE — 7100000000 HC PACU RECOVERY - FIRST 15 MIN: Performed by: SURGERY

## 2019-11-19 PROCEDURE — 7100000011 HC PHASE II RECOVERY - ADDTL 15 MIN: Performed by: SURGERY

## 2019-11-19 PROCEDURE — 2500000003 HC RX 250 WO HCPCS: Performed by: SURGERY

## 2019-11-19 PROCEDURE — 88304 TISSUE EXAM BY PATHOLOGIST: CPT

## 2019-11-19 PROCEDURE — 3700000001 HC ADD 15 MINUTES (ANESTHESIA): Performed by: SURGERY

## 2019-11-19 PROCEDURE — 7100000001 HC PACU RECOVERY - ADDTL 15 MIN: Performed by: SURGERY

## 2019-11-19 PROCEDURE — 2720000010 HC SURG SUPPLY STERILE: Performed by: SURGERY

## 2019-11-19 RX ORDER — MORPHINE SULFATE 2 MG/ML
4 INJECTION, SOLUTION INTRAMUSCULAR; INTRAVENOUS
Status: DISCONTINUED | OUTPATIENT
Start: 2019-11-19 | End: 2019-11-19 | Stop reason: HOSPADM

## 2019-11-19 RX ORDER — KETOROLAC TROMETHAMINE 10 MG/1
10 TABLET, FILM COATED ORAL EVERY 8 HOURS PRN
Qty: 15 TABLET | Refills: 0 | Status: SHIPPED | OUTPATIENT
Start: 2019-11-19 | End: 2019-12-04 | Stop reason: ALTCHOICE

## 2019-11-19 RX ORDER — MORPHINE SULFATE 2 MG/ML
2 INJECTION, SOLUTION INTRAMUSCULAR; INTRAVENOUS
Status: DISCONTINUED | OUTPATIENT
Start: 2019-11-19 | End: 2019-11-19 | Stop reason: HOSPADM

## 2019-11-19 RX ORDER — SODIUM CHLORIDE 9 MG/ML
INJECTION, SOLUTION INTRAVENOUS CONTINUOUS
Status: DISCONTINUED | OUTPATIENT
Start: 2019-11-19 | End: 2019-11-19 | Stop reason: HOSPADM

## 2019-11-19 RX ORDER — FENTANYL CITRATE 50 UG/ML
50 INJECTION, SOLUTION INTRAMUSCULAR; INTRAVENOUS EVERY 5 MIN PRN
Status: DISCONTINUED | OUTPATIENT
Start: 2019-11-19 | End: 2019-11-19 | Stop reason: HOSPADM

## 2019-11-19 RX ORDER — KETOROLAC TROMETHAMINE 30 MG/ML
INJECTION, SOLUTION INTRAMUSCULAR; INTRAVENOUS PRN
Status: DISCONTINUED | OUTPATIENT
Start: 2019-11-19 | End: 2019-11-19 | Stop reason: SDUPTHER

## 2019-11-19 RX ORDER — OXYCODONE HYDROCHLORIDE 5 MG/1
10 TABLET ORAL EVERY 4 HOURS PRN
Status: DISCONTINUED | OUTPATIENT
Start: 2019-11-19 | End: 2019-11-19 | Stop reason: HOSPADM

## 2019-11-19 RX ORDER — ROCURONIUM BROMIDE 10 MG/ML
INJECTION, SOLUTION INTRAVENOUS PRN
Status: DISCONTINUED | OUTPATIENT
Start: 2019-11-19 | End: 2019-11-19 | Stop reason: SDUPTHER

## 2019-11-19 RX ORDER — DEXAMETHASONE SODIUM PHOSPHATE 4 MG/ML
INJECTION, SOLUTION INTRA-ARTICULAR; INTRALESIONAL; INTRAMUSCULAR; INTRAVENOUS; SOFT TISSUE PRN
Status: DISCONTINUED | OUTPATIENT
Start: 2019-11-19 | End: 2019-11-19 | Stop reason: SDUPTHER

## 2019-11-19 RX ORDER — FENTANYL CITRATE 50 UG/ML
INJECTION, SOLUTION INTRAMUSCULAR; INTRAVENOUS PRN
Status: DISCONTINUED | OUTPATIENT
Start: 2019-11-19 | End: 2019-11-19 | Stop reason: SDUPTHER

## 2019-11-19 RX ORDER — ONDANSETRON 2 MG/ML
4 INJECTION INTRAMUSCULAR; INTRAVENOUS EVERY 6 HOURS PRN
Status: DISCONTINUED | OUTPATIENT
Start: 2019-11-19 | End: 2019-11-19 | Stop reason: HOSPADM

## 2019-11-19 RX ORDER — SODIUM CHLORIDE 0.9 % (FLUSH) 0.9 %
10 SYRINGE (ML) INJECTION EVERY 12 HOURS SCHEDULED
Status: DISCONTINUED | OUTPATIENT
Start: 2019-11-19 | End: 2019-11-19 | Stop reason: HOSPADM

## 2019-11-19 RX ORDER — DIBUCAINE 0.28 G/28G
OINTMENT TOPICAL
Qty: 1 TUBE | Refills: 3 | Status: SHIPPED | OUTPATIENT
Start: 2019-11-19 | End: 2020-03-10

## 2019-11-19 RX ORDER — OXYCODONE HYDROCHLORIDE 5 MG/1
5 TABLET ORAL EVERY 4 HOURS PRN
Status: DISCONTINUED | OUTPATIENT
Start: 2019-11-19 | End: 2019-11-19 | Stop reason: HOSPADM

## 2019-11-19 RX ORDER — BUPIVACAINE HYDROCHLORIDE AND EPINEPHRINE 5; 5 MG/ML; UG/ML
INJECTION, SOLUTION EPIDURAL; INTRACAUDAL; PERINEURAL PRN
Status: DISCONTINUED | OUTPATIENT
Start: 2019-11-19 | End: 2019-11-19 | Stop reason: ALTCHOICE

## 2019-11-19 RX ORDER — PROMETHAZINE HYDROCHLORIDE 25 MG/ML
12.5 INJECTION, SOLUTION INTRAMUSCULAR; INTRAVENOUS
Status: DISCONTINUED | OUTPATIENT
Start: 2019-11-19 | End: 2019-11-19 | Stop reason: HOSPADM

## 2019-11-19 RX ORDER — SODIUM CHLORIDE 0.9 % (FLUSH) 0.9 %
10 SYRINGE (ML) INJECTION PRN
Status: DISCONTINUED | OUTPATIENT
Start: 2019-11-19 | End: 2019-11-19 | Stop reason: HOSPADM

## 2019-11-19 RX ORDER — LABETALOL 20 MG/4 ML (5 MG/ML) INTRAVENOUS SYRINGE
10 EVERY 10 MIN PRN
Status: DISCONTINUED | OUTPATIENT
Start: 2019-11-19 | End: 2019-11-19 | Stop reason: HOSPADM

## 2019-11-19 RX ORDER — HYDROCODONE BITARTRATE AND ACETAMINOPHEN 5; 325 MG/1; MG/1
1-2 TABLET ORAL EVERY 6 HOURS PRN
Qty: 20 TABLET | Refills: 0 | Status: SHIPPED | OUTPATIENT
Start: 2019-11-19 | End: 2019-11-22

## 2019-11-19 RX ORDER — LIDOCAINE HCL/PF 100 MG/5ML
SYRINGE (ML) INJECTION PRN
Status: DISCONTINUED | OUTPATIENT
Start: 2019-11-19 | End: 2019-11-19 | Stop reason: SDUPTHER

## 2019-11-19 RX ADMIN — FENTANYL CITRATE 100 MCG: 50 INJECTION INTRAMUSCULAR; INTRAVENOUS at 12:30

## 2019-11-19 RX ADMIN — ROCURONIUM BROMIDE 30 MG: 10 INJECTION INTRAVENOUS at 12:30

## 2019-11-19 RX ADMIN — SODIUM CHLORIDE: 9 INJECTION, SOLUTION INTRAVENOUS at 13:19

## 2019-11-19 RX ADMIN — Medication 100 MG: at 12:30

## 2019-11-19 RX ADMIN — Medication 2 G: at 12:30

## 2019-11-19 RX ADMIN — PROPOFOL 200 MG: 10 INJECTION, EMULSION INTRAVENOUS at 12:26

## 2019-11-19 RX ADMIN — DEXAMETHASONE SODIUM PHOSPHATE 4 MG: 4 INJECTION, SOLUTION INTRAMUSCULAR; INTRAVENOUS at 12:33

## 2019-11-19 RX ADMIN — KETOROLAC TROMETHAMINE 30 MG: 30 INJECTION, SOLUTION INTRAMUSCULAR; INTRAVENOUS at 13:06

## 2019-11-19 RX ADMIN — SODIUM CHLORIDE: 9 INJECTION, SOLUTION INTRAVENOUS at 11:04

## 2019-11-19 ASSESSMENT — PULMONARY FUNCTION TESTS
PIF_VALUE: 18
PIF_VALUE: 19
PIF_VALUE: 1
PIF_VALUE: 2
PIF_VALUE: 18
PIF_VALUE: 2
PIF_VALUE: 2
PIF_VALUE: 19
PIF_VALUE: 19
PIF_VALUE: 0
PIF_VALUE: 21
PIF_VALUE: 2
PIF_VALUE: 21
PIF_VALUE: 18
PIF_VALUE: 19
PIF_VALUE: 21
PIF_VALUE: 21
PIF_VALUE: 19
PIF_VALUE: 21
PIF_VALUE: 16
PIF_VALUE: 19
PIF_VALUE: 4
PIF_VALUE: 21
PIF_VALUE: 19
PIF_VALUE: 21
PIF_VALUE: 19
PIF_VALUE: 21
PIF_VALUE: 18
PIF_VALUE: 3
PIF_VALUE: 21
PIF_VALUE: 21
PIF_VALUE: 3
PIF_VALUE: 14
PIF_VALUE: 14
PIF_VALUE: 19
PIF_VALUE: 18
PIF_VALUE: 20
PIF_VALUE: 21
PIF_VALUE: 18
PIF_VALUE: 19
PIF_VALUE: 19
PIF_VALUE: 21
PIF_VALUE: 18
PIF_VALUE: 21
PIF_VALUE: 21
PIF_VALUE: 20
PIF_VALUE: 18
PIF_VALUE: 21
PIF_VALUE: 19
PIF_VALUE: 2
PIF_VALUE: 24
PIF_VALUE: 19
PIF_VALUE: 21
PIF_VALUE: 9

## 2019-11-19 ASSESSMENT — LIFESTYLE VARIABLES: SMOKING_STATUS: 1

## 2019-11-19 ASSESSMENT — PAIN - FUNCTIONAL ASSESSMENT: PAIN_FUNCTIONAL_ASSESSMENT: 0-10

## 2019-11-19 ASSESSMENT — PAIN SCALES - GENERAL: PAINLEVEL_OUTOF10: 0

## 2019-11-20 ENCOUNTER — TELEPHONE (OUTPATIENT)
Dept: SURGERY | Age: 34
End: 2019-11-20

## 2019-12-04 ENCOUNTER — OFFICE VISIT (OUTPATIENT)
Dept: SURGERY | Age: 34
End: 2019-12-04

## 2019-12-04 VITALS
RESPIRATION RATE: 18 BRPM | BODY MASS INDEX: 34.49 KG/M2 | HEART RATE: 116 BPM | WEIGHT: 214.6 LBS | OXYGEN SATURATION: 98 % | DIASTOLIC BLOOD PRESSURE: 60 MMHG | HEIGHT: 66 IN | TEMPERATURE: 98.6 F | SYSTOLIC BLOOD PRESSURE: 118 MMHG

## 2019-12-04 DIAGNOSIS — Z87.19 S/P HEMORRHOIDECTOMY: Primary | ICD-10-CM

## 2019-12-04 DIAGNOSIS — Z98.890 S/P HEMORRHOIDECTOMY: Primary | ICD-10-CM

## 2019-12-04 PROCEDURE — 99024 POSTOP FOLLOW-UP VISIT: CPT | Performed by: NURSE PRACTITIONER

## 2019-12-05 ASSESSMENT — ENCOUNTER SYMPTOMS
WHEEZING: 0
CHEST TIGHTNESS: 0
SHORTNESS OF BREATH: 0
RECTAL PAIN: 1
ABDOMINAL DISTENTION: 0
BLOOD IN STOOL: 0
NAUSEA: 0
ALLERGIC/IMMUNOLOGIC NEGATIVE: 1
SORE THROAT: 0
RHINORRHEA: 0
EYE ITCHING: 0
DIARRHEA: 0
APNEA: 0
ANAL BLEEDING: 0
EYE PAIN: 0
COLOR CHANGE: 0
CONSTIPATION: 0
EYE DISCHARGE: 0
VOMITING: 0
SINUS PRESSURE: 0
COUGH: 0
ABDOMINAL PAIN: 0
BACK PAIN: 0
CHOKING: 0
PHOTOPHOBIA: 0

## 2020-01-07 ENCOUNTER — OFFICE VISIT (OUTPATIENT)
Dept: FAMILY MEDICINE CLINIC | Age: 35
End: 2020-01-07
Payer: COMMERCIAL

## 2020-01-07 VITALS
WEIGHT: 210.7 LBS | OXYGEN SATURATION: 97 % | BODY MASS INDEX: 33.86 KG/M2 | DIASTOLIC BLOOD PRESSURE: 74 MMHG | SYSTOLIC BLOOD PRESSURE: 120 MMHG | HEIGHT: 66 IN | RESPIRATION RATE: 16 BRPM | HEART RATE: 88 BPM

## 2020-01-07 PROCEDURE — 4004F PT TOBACCO SCREEN RCVD TLK: CPT | Performed by: NURSE PRACTITIONER

## 2020-01-07 PROCEDURE — 4130F TOPICAL PREP RX AOE: CPT | Performed by: NURSE PRACTITIONER

## 2020-01-07 PROCEDURE — G8427 DOCREV CUR MEDS BY ELIG CLIN: HCPCS | Performed by: NURSE PRACTITIONER

## 2020-01-07 PROCEDURE — 99213 OFFICE O/P EST LOW 20 MIN: CPT | Performed by: NURSE PRACTITIONER

## 2020-01-07 PROCEDURE — G8484 FLU IMMUNIZE NO ADMIN: HCPCS | Performed by: NURSE PRACTITIONER

## 2020-01-07 PROCEDURE — G8417 CALC BMI ABV UP PARAM F/U: HCPCS | Performed by: NURSE PRACTITIONER

## 2020-01-07 RX ORDER — LEVOFLOXACIN 750 MG/1
750 TABLET ORAL DAILY
Qty: 5 TABLET | Refills: 0 | Status: SHIPPED | OUTPATIENT
Start: 2020-01-07 | End: 2020-01-12

## 2020-01-07 RX ORDER — PREDNISONE 50 MG/1
50 TABLET ORAL DAILY
Qty: 5 TABLET | Refills: 0 | Status: SHIPPED | OUTPATIENT
Start: 2020-01-07 | End: 2020-01-12

## 2020-01-07 ASSESSMENT — PATIENT HEALTH QUESTIONNAIRE - PHQ9
SUM OF ALL RESPONSES TO PHQ QUESTIONS 1-9: 0
1. LITTLE INTEREST OR PLEASURE IN DOING THINGS: 0
SUM OF ALL RESPONSES TO PHQ QUESTIONS 1-9: 0
2. FEELING DOWN, DEPRESSED OR HOPELESS: 0
SUM OF ALL RESPONSES TO PHQ9 QUESTIONS 1 & 2: 0

## 2020-01-07 ASSESSMENT — ENCOUNTER SYMPTOMS
RHINORRHEA: 1
NAUSEA: 0
SINUS PRESSURE: 0
COUGH: 1
SHORTNESS OF BREATH: 1
CHEST TIGHTNESS: 1
ABDOMINAL PAIN: 0
SINUS PAIN: 0

## 2020-01-07 NOTE — PROGRESS NOTES
Subjective:      Patient ID: Master Puri is a 29 y.o. female. HPI: Acute for URI    Chief Complaint   Patient presents with   Kranthi Cho ED Follow-up    Ear Fullness     right ear draining ?snot like drainage, left feels full    Cough    Chest Congestion    Shortness of Breath       Went to [facility] on [address] 12/24/19 for sinus complaints. Sx with Sinobronchitis and bilateral ear infection. Was placed on Doxycyline and Tessalon. Continues with symptoms. Continue with fullness of ears. SOB. Chest tightness. Chest congestion and cough. Cough that is productive. Nasal congestion. Denies sinus pressure or congestion. Right ear drainage. Mild discomfort. Left ear fullness. Reoccurrence of ETD and sinus complaints. Hx of allergies and allergy shots when she was younger. Does not take allergy medication. Underlying asthma. Vitals:    01/07/20 1350   BP: 120/74   Pulse: 88   Resp: 16   SpO2: 97%       Lab Results   Component Value Date    LABA1C 6.7 (H) 11/08/2019     Lab Results   Component Value Date     11/08/2019       Patient Active Problem List   Diagnosis    Asthma with bronchitis    Diabetes mellitus, type 2 ([facility] [address]    Hyperlipidemia    Class 1 obesity due to excess calories with serious comorbidity and body mass index (BMI) of 32.0 to 32.9 in adult    Metabolic syndrome       Review of Systems   Constitutional: Positive for fatigue. Negative for chills and fever. HENT: Positive for congestion, ear discharge, hearing loss, postnasal drip and rhinorrhea. Negative for sinus pressure and sinus pain. Respiratory: Positive for cough, chest tightness and shortness of breath. Cardiovascular: Negative for chest pain. Gastrointestinal: Negative for abdominal pain and nausea. Skin: Negative for rash. Neurological: Positive for headaches. Negative for dizziness and light-headedness. Psychiatric/Behavioral: Negative.         Objective:   Physical Exam  Constitutional: General: She is not in acute distress. HENT:      Right Ear: Drainage and swelling present. No tenderness. Left Ear: A middle ear effusion is present. Nose: Mucosal edema, congestion and rhinorrhea present. Right Sinus: No maxillary sinus tenderness. Left Sinus: No maxillary sinus tenderness. Mouth/Throat:      Pharynx: Pharyngeal swelling present. No oropharyngeal exudate or posterior oropharyngeal erythema. Eyes:      Pupils: Pupils are equal, round, and reactive to light. Neck:      Musculoskeletal: Normal range of motion and neck supple. Cardiovascular:      Rate and Rhythm: Normal rate and regular rhythm. Heart sounds: No murmur. Pulmonary:      Effort: Pulmonary effort is normal.      Breath sounds: Decreased breath sounds present. No wheezing. Abdominal:      General: Bowel sounds are normal. There is no distension. Palpations: Abdomen is soft. Tenderness: There is no tenderness. Musculoskeletal: Normal range of motion. General: No tenderness. Skin:     General: Skin is warm and dry. Findings: No rash. Assessment:       Diagnosis Orders   1. Chronic recurrent sinusitis  predniSONE (DELTASONE) 50 MG tablet    levofloxacin (LEVAQUIN) 750 MG tablet    Amb External Referral To ENT   2. Asthma with bronchitis     3. ETD (Eustachian tube dysfunction), bilateral  predniSONE (DELTASONE) 50 MG tablet    Amb External Referral To ENT   4.  Acute swimmer's ear of right side  neomycin-polymyxin-hydrocortisone (CORTISPORIN) [id number] otic solution           Plan:      Orders as above   Fluids and rest  Refer to ENT due to reoccurrence of ETD related to sinus  RTO if symptoms worsen or stay the same              Antonia Handy, APRN - CNP

## 2020-01-07 NOTE — PROGRESS NOTES
Visit Information    Have you changed or started any medications since your last visit including any over-the-counter medicines, vitamins, or herbal medicines? no   Are you having any side effects from any of your medications? -  no  Have you stopped taking any of your medications? Is so, why? -  no    Have you seen any other physician or provider since your last visit? Yes - Records Obtained  Have you had any other diagnostic tests since your last visit? No  Have you been seen in the emergency room and/or had an admission to a hospital since we last saw you? Yes - Records Obtained  Have you had your routine dental cleaning in the past 6 months? Yes--10/19    Have you activated your [employer] account? If not, what are your barriers? Yes     Patient Care Team:  SUNNY Jean CNP as PCP - General  SUNNY Jean CNP as PCP - [facility] Provider    Medical History Review  Past Medical, Family, and Social History reviewed and does contribute to the patient presenting condition    Health Maintenance   Topic Date Due    Varicella Vaccine (1 of 2 - 2-dose childhood series) 01/15/1986    Pneumococcal 0-64 years Vaccine (1 of 1 - PPSV23) 01/15/1991    DTaP/Tdap/Td vaccine (1 - Tdap) 01/15/1996    Hepatitis B vaccine (1 of 3 - Risk 3-dose series) 01/15/2004    Cervical cancer screen  01/08/2019    Diabetic foot exam  01/24/2020    Diabetic retinal exam  03/18/2020    A1C test (Diabetic or Prediabetic)  11/08/2020    Diabetic microalbuminuria test  11/08/2020    Lipid screen  11/08/2020    Flu vaccine  Completed    HIV screen  Addressed       Referral faxed to Dr Carrera Pod office at #[phone]. Patient aware they will call her to schedule.

## 2020-01-17 ENCOUNTER — PATIENT MESSAGE (OUTPATIENT)
Dept: FAMILY MEDICINE CLINIC | Age: 35
End: 2020-01-17

## 2020-01-17 RX ORDER — AMOXICILLIN AND CLAVULANATE POTASSIUM 875; 125 MG/1; MG/1
1 TABLET, FILM COATED ORAL 2 TIMES DAILY WITH MEALS
Qty: 20 TABLET | Refills: 0 | Status: SHIPPED | OUTPATIENT
Start: 2020-01-17 | End: 2020-01-27

## 2020-01-26 ENCOUNTER — PATIENT MESSAGE (OUTPATIENT)
Dept: FAMILY MEDICINE CLINIC | Age: 35
End: 2020-01-26

## 2020-01-27 RX ORDER — FLUCONAZOLE 150 MG/1
150 TABLET ORAL ONCE
Qty: 2 TABLET | Refills: 0 | Status: SHIPPED | OUTPATIENT
Start: 2020-01-27 | End: 2020-01-27

## 2020-01-27 NOTE — TELEPHONE ENCOUNTER
From: James Christiansen  To: Bryanna Ordonez APRN - CNP  Sent: 1/26/2020 5:19 PM EST  Subject: Prescription Question    Could you send a script in for diflucan? The pills the specialist put me on are causing issues. Thanks in advance.

## 2020-02-10 ENCOUNTER — OFFICE VISIT (OUTPATIENT)
Dept: FAMILY MEDICINE CLINIC | Age: 35
End: 2020-02-10
Payer: COMMERCIAL

## 2020-02-10 VITALS
HEART RATE: 105 BPM | WEIGHT: 215.8 LBS | SYSTOLIC BLOOD PRESSURE: 136 MMHG | BODY MASS INDEX: 34.83 KG/M2 | RESPIRATION RATE: 15 BRPM | OXYGEN SATURATION: 97 % | DIASTOLIC BLOOD PRESSURE: 84 MMHG

## 2020-02-10 PROBLEM — E66.9 OBESITY, CLASS I, BMI 30-34.9: Status: ACTIVE | Noted: 2020-02-10

## 2020-02-10 PROBLEM — E66.811 OBESITY, CLASS I, BMI 30-34.9: Status: ACTIVE | Noted: 2020-02-10

## 2020-02-10 LAB — HBA1C MFR BLD: 6.3 %

## 2020-02-10 PROCEDURE — 4004F PT TOBACCO SCREEN RCVD TLK: CPT | Performed by: NURSE PRACTITIONER

## 2020-02-10 PROCEDURE — 3044F HG A1C LEVEL LT 7.0%: CPT | Performed by: NURSE PRACTITIONER

## 2020-02-10 PROCEDURE — G8427 DOCREV CUR MEDS BY ELIG CLIN: HCPCS | Performed by: NURSE PRACTITIONER

## 2020-02-10 PROCEDURE — 2022F DILAT RTA XM EVC RTNOPTHY: CPT | Performed by: NURSE PRACTITIONER

## 2020-02-10 PROCEDURE — G8484 FLU IMMUNIZE NO ADMIN: HCPCS | Performed by: NURSE PRACTITIONER

## 2020-02-10 PROCEDURE — 83036 HEMOGLOBIN GLYCOSYLATED A1C: CPT | Performed by: NURSE PRACTITIONER

## 2020-02-10 PROCEDURE — G8417 CALC BMI ABV UP PARAM F/U: HCPCS | Performed by: NURSE PRACTITIONER

## 2020-02-10 PROCEDURE — 99214 OFFICE O/P EST MOD 30 MIN: CPT | Performed by: NURSE PRACTITIONER

## 2020-02-10 RX ORDER — PHENTERMINE HYDROCHLORIDE 37.5 MG/1
37.5 CAPSULE ORAL EVERY MORNING
Qty: 30 CAPSULE | Refills: 0 | Status: SHIPPED | OUTPATIENT
Start: 2020-02-10 | End: 2020-03-11

## 2020-02-10 RX ORDER — ATORVASTATIN CALCIUM 20 MG/1
20 TABLET, FILM COATED ORAL DAILY
Qty: 90 TABLET | Refills: 3 | Status: SHIPPED | OUTPATIENT
Start: 2020-02-10 | End: 2021-04-09 | Stop reason: SDUPTHER

## 2020-02-10 ASSESSMENT — ENCOUNTER SYMPTOMS
COUGH: 0
ABDOMINAL PAIN: 0
NAUSEA: 0

## 2020-02-10 NOTE — PATIENT INSTRUCTIONS
You may receive a survey regarding the care you received during your visit. Your input is valuable to us. We encourage you to complete and return your survey. We hope you will choose us in the future for your healthcare needs. Tobacco Cessation Programs     Telephonic behavior modification  Chung November ([phone])   Counseling service for those who are ready to quit using tobacco.     Available for uninsured [place] residents, [place] recipients, pregnant women, or patients whose health plans or employers are members of the [address] [place] [address] behavior modification   http://[place]. Quitlogix. org   Online support program to help patients through each step of the quitting process. Available 24 hours a day 7 days a week. Provides up to date researched based tool, step-by-step guides, and motivational messages. Online behavior modification   www.lungusa.org/stop-smoking/how-to-quit   HelpLine: 1-800-LUNG-[place] (565-1847)   Email questions to:  [email or web address] [employer]    Website offers resources to help tobacco users figure out their reasons for quitting and then take the big step of quitting for good. Hypnosis   Location: [facility], [facility] [place] [place]   Contact: Destiny Lanza, PhD at [phone]   Hypnosis for tobacco cessation   Cost $225 for the initial session and $175 for each session afterwards. Most patients require 6-8 sessions. There is the option to submit through the patients insurance. Hypnosis and behavior modification   Location: [place] [address],  [facility] [address] [place] [address], [place]   Contact: Gio Bethea, PhD at [phone]  [facility] Counseling and hypnosis for nicotine addition   Cost: For uninsured patients:  Please call above phone number  Cost for insured patients depends on patients insurance plan.     Behavior modification   Location: [facility], [address] [place], [address]: [place] [address] include four one-on-one appointments between the patient and a respiratory therapist.  The four appointments span over three weeks. The respiratory therapist schedules one of the appointments to occur 48 hours after the patients quit date.  Cost $100 total for the four sessions.   Tobacco cessation products are not included in the cost and are not provided by [facility].

## 2020-02-10 NOTE — PROGRESS NOTES
Diabetic foot exam  01/24/2020    Diabetic retinal exam  03/18/2020    A1C test (Diabetic or Prediabetic)  11/08/2020    Diabetic microalbuminuria test  11/08/2020    Lipid screen  11/08/2020    Shingles Vaccine (1 of 2) 01/15/2035    Flu vaccine  Completed    HIV screen  Addressed    Hepatitis A vaccine  Aged Out    Hib vaccine  Aged Out    Meningococcal (ACWY) vaccine  Aged Out

## 2020-02-10 NOTE — PROGRESS NOTES
Subjective:      Patient ID: Linh Grandchild is a 28 y.o. female. HPI: 3 month Follow Up    Chief Complaint   Patient presents with    3 Month Follow-Up    Diabetes     2    Medication Refill     Patient Active Problem List   Diagnosis    Asthma with bronchitis    Diabetes mellitus, type 2 ([address]    Hyperlipidemia    Metabolic syndrome    Obesity, Class I, BMI 30-34.9     ENT - Dr. Courtney Lind - Dr. Zandra Estrada    Overall doing well. Denies CP, SOB or chest tightness    Anxiety stable on Effexor 150 mg XL. Wt Readings from Last 3 Encounters:   02/10/20 215 lb 12.8 oz (97.9 kg)   01/07/20 210 lb 11.2 oz (95.6 kg)   12/04/19 214 lb 9.6 oz (97.3 kg)     BP wnl. No ACE at this time. Microalbuminuria NEG. BP Readings from Last 3 Encounters:   02/10/20 136/84   01/07/20 120/74   12/04/19 118/60     On Metformin 1000 mg BID, Tradjenta 5 mg and Bydureon. Intolerance to GLP-1. A1C improved. Lab Results   Component Value Date    LABA1C 6.3 02/10/2020    LABA1C 6.7 (H) 11/08/2019    LABA1C 5.9 11/05/2018     Lab Results   Component Value Date     11/08/2019       On Lipitor 20 mg. Tolerating well. LIPIDS well controlled.   Hysterectomy    No components found for: CHLPL  Lab Results   Component Value Date    TRIG 125 11/08/2019    TRIG 59 11/05/2018    TRIG 106 01/22/2018     Lab Results   Component Value Date    HDL 43 11/08/2019    HDL 43 11/05/2018    HDL 37 01/22/2018     Lab Results   Component Value Date    LDLCALC 63 11/05/2018    LDLCALC 89 01/22/2018    LDLCALC 71 03/22/2017     No results found for: LABVLDL      Chemistry        Component Value Date/Time     11/08/2019 0723    K 3.8 11/08/2019 0723     11/08/2019 0723    CO2 28 11/08/2019 0723    BUN 7 11/08/2019 0723    CREATININE 0.70 11/08/2019 0723        Component Value Date/Time    CALCIUM 9.10 11/08/2019 0723    ALKPHOS 75 01/22/2018 1055    AST 14 01/22/2018 1055    ALT 28 01/22/2018 1055    BILITOT 0.3 01/22/2018 1055            Lab Results   Component Value Date    TSH 1.280 05/14/2015       Lab Results   Component Value Date    WBC 16.2 (H) 06/10/2017    HGB 15.1 (H) 11/08/2019    HCT 45.7 (H) 11/08/2019    MCV 84.2 06/10/2017     06/10/2017         Health Maintenance   Topic Date Due    Varicella vaccine (1 of 2 - 2-dose childhood series) 01/15/1986    Pneumococcal 0-64 years Vaccine (1 of 1 - PPSV23) 01/15/1991    DTaP/Tdap/Td vaccine (1 - Tdap) 01/15/1996    Hepatitis B vaccine (1 of 3 - Risk 3-dose series) 01/15/2004    Cervical cancer screen  01/08/2019    Diabetic foot exam  01/24/2020    Diabetic retinal exam  03/18/2020    A1C test (Diabetic or Prediabetic)  11/08/2020    Diabetic microalbuminuria test  11/08/2020    Lipid screen  11/08/2020    Shingles Vaccine (1 of 2) 01/15/2035    Flu vaccine  Completed    HIV screen  Addressed    Hepatitis A vaccine  Aged Out    Hib vaccine  Aged Out    Meningococcal (ACWY) vaccine  Aged [employer] History   Administered Date(s) Administered    Influenza Vaccine, unspecified formulation 09/29/2016    Influenza Virus Vaccine 09/26/2017, 09/25/2019       Review of Systems   Constitutional: Negative for chills and fever. HENT: Negative. Respiratory: Negative for cough. Gastrointestinal: Negative for abdominal pain and nausea. Skin: Negative for rash. Neurological: Negative for light-headedness. Psychiatric/Behavioral: Negative. Objective:   Physical Exam  Constitutional:       General: She is not in acute distress. Appearance: She is well-developed. HENT:      Right Ear: Tympanic membrane normal.      Left Ear: Tympanic membrane normal.      Nose: Nose normal.   Cardiovascular:      Rate and Rhythm: Normal rate and regular rhythm. Pulses: Normal pulses. Heart sounds: Normal heart sounds, S1 normal and S2 normal. No murmur. No S3 sounds.     Pulmonary:      Effort: Pulmonary effort is normal. Breath sounds: Normal breath sounds. No decreased breath sounds, wheezing or rhonchi. Abdominal:      General: Bowel sounds are normal.      Palpations: Abdomen is soft. Tenderness: There is no abdominal tenderness. Neurological:      Mental Status: She is alert and oriented to person, place, and time. Psychiatric:         Behavior: Behavior normal.         Assessment:       Diagnosis Orders   1. Type 2 diabetes mellitus without complication, without long-term current use of insulin ([facility])   DIABETES FOOT EXAM    metFORMIN (GLUCOPHAGE) 1000 MG tablet    POCT glycosylated hemoglobin (Hb A1C)    Exenatide (BYDUREON) 2 MG PEN   2. Mixed hyperlipidemia  atorvastatin (LIPITOR) 20 MG tablet   3. Metabolic syndrome     4. Obesity, Class I, BMI 30-34. 9  phentermine (ADIPEX-P) 37.5 MG capsule   5. GREER (generalized anxiety disorder)             Plan:       Chronic conditions stable  Labs reviewed - A1C well improved  Refills as above   - d/c Tradjenta lack of benefit in combo with Bydureon   - Adipex  Follow up with Specialists  RTO in 1 months      Angel No received counseling on the following healthy behaviors: nutrition and exercise  Reviewed prior labs and health maintenance  Continue current medications, diet and exercise. Discussed use, benefit, and side effects of prescribed medications. Barriers to medication compliance addressed. Patient given educational materials - see patient instructions  Was a self-tracking handout given in paper form or via [employer]?  No:     Requested Prescriptions     Signed Prescriptions Disp Refills    atorvastatin (LIPITOR) 20 MG tablet 90 tablet 3     Sig: Take 1 tablet by mouth daily    metFORMIN (GLUCOPHAGE) 1000 MG tablet 180 tablet 3     Sig: Take 1 tablet by mouth 2 times daily (with meals)    Exenatide (BYDUREON) 2 MG PEN 12 pen 1     Sig: Inject 1 pen into the skin every 7 days    phentermine (ADIPEX-P) 37.5 MG capsule 30 capsule 0     Sig: Take 1 capsule by mouth every morning for 30 days. All patient questions answered. Patient voiced understanding. Quality Measures    Body mass index is 34.83 kg/m². Elevated. Weight control planned discussed Healthy diet and regular exercise. BP: 136/84 Blood pressure is normal. Treatment plan consists of No treatment change needed.     Lab Results   Component Value Date    LDLCALC 63 11/05/2018    LDLDIRECT 99 11/08/2019    (goal LDL reduction with dx if diabetes is 50% LDL reduction)      PHQ Scores 1/7/2020 2/6/2019 5/29/2018 3/27/2017   PHQ2 Score 0 0 0 0   PHQ9 Score 0 0 0 0     Interpretation of Total Score Depression Severity: 1-4 = Minimal depression, 5-9 = Mild depression, 10-14 = Moderate depression, 15-19 = Moderately severe depression, 20-27 = Severe depression

## 2020-03-10 ENCOUNTER — OFFICE VISIT (OUTPATIENT)
Dept: FAMILY MEDICINE CLINIC | Age: 35
End: 2020-03-10
Payer: COMMERCIAL

## 2020-03-10 VITALS
DIASTOLIC BLOOD PRESSURE: 88 MMHG | HEIGHT: 66 IN | BODY MASS INDEX: 33.3 KG/M2 | HEART RATE: 92 BPM | RESPIRATION RATE: 20 BRPM | SYSTOLIC BLOOD PRESSURE: 132 MMHG | WEIGHT: 207.2 LBS

## 2020-03-10 PROCEDURE — G8427 DOCREV CUR MEDS BY ELIG CLIN: HCPCS | Performed by: NURSE PRACTITIONER

## 2020-03-10 PROCEDURE — 2022F DILAT RTA XM EVC RTNOPTHY: CPT | Performed by: NURSE PRACTITIONER

## 2020-03-10 PROCEDURE — G8484 FLU IMMUNIZE NO ADMIN: HCPCS | Performed by: NURSE PRACTITIONER

## 2020-03-10 PROCEDURE — 3044F HG A1C LEVEL LT 7.0%: CPT | Performed by: NURSE PRACTITIONER

## 2020-03-10 PROCEDURE — 4004F PT TOBACCO SCREEN RCVD TLK: CPT | Performed by: NURSE PRACTITIONER

## 2020-03-10 PROCEDURE — 99213 OFFICE O/P EST LOW 20 MIN: CPT | Performed by: NURSE PRACTITIONER

## 2020-03-10 PROCEDURE — G8417 CALC BMI ABV UP PARAM F/U: HCPCS | Performed by: NURSE PRACTITIONER

## 2020-03-10 RX ORDER — PHENTERMINE HYDROCHLORIDE 37.5 MG/1
37.5 CAPSULE ORAL EVERY MORNING
Qty: 30 CAPSULE | Refills: 0 | Status: SHIPPED | OUTPATIENT
Start: 2020-03-10 | End: 2020-04-07 | Stop reason: SDUPTHER

## 2020-03-10 ASSESSMENT — ENCOUNTER SYMPTOMS
NAUSEA: 0
ABDOMINAL PAIN: 0
COUGH: 0

## 2020-03-10 NOTE — PROGRESS NOTES
Subjective:      Patient ID: Linh Grandchild is a 28 y.o. female. HPI  : 3 month Follow Up    Chief Complaint   Patient presents with    1 Month Follow-Up     obesity       Adipex #1. Tolerating. Lost 8#. Cut back on pop. Portion control. No exercise regime at this time. Goal < 200#    Wt Readings from Last 3 Encounters:   03/10/20 207 lb 3.2 oz (94 kg)   02/10/20 215 lb 12.8 oz (97.9 kg)   01/07/20 210 lb 11.2 oz (95.6 kg)       Patient Active Problem List   Diagnosis    Asthma with bronchitis    Diabetes mellitus, type 2 ([facility] [address]    Hyperlipidemia    Metabolic syndrome    Obesity, Class I, BMI 30-34.9     ENT - Dr. Courtney Lind - Dr. Zandra Estrada    Overall doing well. Denies CP, SOB or chest tightness    Anxiety stable on Effexor 150 mg XL. Wt Readings from Last 3 Encounters:   03/10/20 207 lb 3.2 oz (94 kg)   02/10/20 215 lb 12.8 oz (97.9 kg)   01/07/20 210 lb 11.2 oz (95.6 kg)     BP wnl. No ACE at this time. Microalbuminuria NEG. BP Readings from Last 3 Encounters:   03/10/20 132/88   02/10/20 136/84   01/07/20 120/74     On Metformin 1000 mg BID, Tradjenta 5 mg and Bydureon. Intolerance to GLP-1. A1C improved. Lab Results   Component Value Date    LABA1C 6.3 02/10/2020    LABA1C 6.7 (H) 11/08/2019    LABA1C 5.9 11/05/2018     Lab Results   Component Value Date     11/08/2019       On Lipitor 20 mg. Tolerating well. LIPIDS well controlled.   Hysterectomy    No components found for: CHLPL  Lab Results   Component Value Date    TRIG 125 11/08/2019    TRIG 59 11/05/2018    TRIG 106 01/22/2018     Lab Results   Component Value Date    HDL 43 11/08/2019    HDL 43 11/05/2018    HDL 37 01/22/2018     Lab Results   Component Value Date    LDLCALC 63 11/05/2018    LDLCALC 89 01/22/2018    LDLCALC 71 03/22/2017     No results found for: LABVLDL      Chemistry        Component Value Date/Time     11/08/2019 0723    K 3.8 11/08/2019 0723     11/08/2019 0723    CO2 28 Cardiovascular:      Rate and Rhythm: Normal rate and regular rhythm. Pulses: Normal pulses. Heart sounds: Normal heart sounds, S1 normal and S2 normal. No murmur. No S3 sounds. Pulmonary:      Effort: Pulmonary effort is normal.      Breath sounds: Normal breath sounds. No decreased breath sounds, wheezing or rhonchi. Abdominal:      General: Bowel sounds are normal.      Palpations: Abdomen is soft. Tenderness: There is no abdominal tenderness. Neurological:      Mental Status: She is alert and oriented to person, place, and time. Psychiatric:         Behavior: Behavior normal.         Assessment:       Diagnosis Orders   1. Obesity, Class I, BMI 30-34. 9  phentermine (ADIPEX-P) 37.5 MG capsule   2. Type 2 diabetes mellitus without complication, without long-term current use of insulin ([address]     3. Metabolic syndrome             Plan:       8# lost!! Adipex #2  Establish exercise regime  RTO in 1 months      Ziggy Schwarz received counseling on the following healthy behaviors: nutrition and exercise  Reviewed prior labs and health maintenance  Continue current medications, diet and exercise. Discussed use, benefit, and side effects of prescribed medications. Barriers to medication compliance addressed. Patient given educational materials - see patient instructions  Was a self-tracking handout given in paper form or via [employer]? No:     Requested Prescriptions     Signed Prescriptions Disp Refills    phentermine (ADIPEX-P) 37.5 MG capsule 30 capsule 0     Sig: Take 1 capsule by mouth every morning for 30 days. All patient questions answered. Patient voiced understanding. Quality Measures    Body mass index is 33.44 kg/m². Elevated. Weight control planned discussed Healthy diet and regular exercise. BP: 132/88 Blood pressure is normal. Treatment plan consists of No treatment change needed.     Lab Results   Component Value Date    LDLCALC 63 11/05/2018    LDLDIRECT 99 11/08/2019 (goal LDL reduction with dx if diabetes is 50% LDL reduction)      PHQ Scores 1/7/2020 2/6/2019 5/29/2018 3/27/2017   PHQ2 Score 0 0 0 0   PHQ9 Score 0 0 0 0     Interpretation of Total Score Depression Severity: 1-4 = Minimal depression, 5-9 = Mild depression, 10-14 = Moderate depression, 15-19 = Moderately severe depression, 20-27 = Severe depression

## 2020-04-07 ENCOUNTER — TELEMEDICINE (OUTPATIENT)
Dept: FAMILY MEDICINE CLINIC | Age: 35
End: 2020-04-07
Payer: COMMERCIAL

## 2020-04-07 VITALS — WEIGHT: 202 LBS | BODY MASS INDEX: 32.6 KG/M2

## 2020-04-07 PROCEDURE — G8428 CUR MEDS NOT DOCUMENT: HCPCS | Performed by: NURSE PRACTITIONER

## 2020-04-07 PROCEDURE — 2022F DILAT RTA XM EVC RTNOPTHY: CPT | Performed by: NURSE PRACTITIONER

## 2020-04-07 PROCEDURE — 99213 OFFICE O/P EST LOW 20 MIN: CPT | Performed by: NURSE PRACTITIONER

## 2020-04-07 PROCEDURE — 3044F HG A1C LEVEL LT 7.0%: CPT | Performed by: NURSE PRACTITIONER

## 2020-04-07 RX ORDER — PHENTERMINE HYDROCHLORIDE 37.5 MG/1
37.5 CAPSULE ORAL EVERY MORNING
Qty: 30 CAPSULE | Refills: 0 | Status: SHIPPED | OUTPATIENT
Start: 2020-04-07 | End: 2020-05-07

## 2020-04-07 ASSESSMENT — ENCOUNTER SYMPTOMS
ABDOMINAL PAIN: 0
COUGH: 0
NAUSEA: 0

## 2020-04-07 NOTE — PROGRESS NOTES
HDL 43 11/05/2018    HDL 37 01/22/2018     Lab Results   Component Value Date    LDLCALC 63 11/05/2018    LDLCALC 89 01/22/2018    LDLCALC 71 03/22/2017     No results found for: LABVLDL      Chemistry        Component Value Date/Time     11/08/2019 0723    K 3.8 11/08/2019 0723     11/08/2019 0723    CO2 28 11/08/2019 0723    BUN 7 11/08/2019 0723    CREATININE 0.70 11/08/2019 0723        Component Value Date/Time    CALCIUM 9.10 11/08/2019 0723    ALKPHOS 75 01/22/2018 1055    AST 14 01/22/2018 1055    ALT 28 01/22/2018 1055    BILITOT 0.3 01/22/2018 1055            Lab Results   Component Value Date    TSH 1.280 05/14/2015       Lab Results   Component Value Date    WBC 16.2 (H) 06/10/2017    HGB 15.1 (H) 11/08/2019    HCT 45.7 (H) 11/08/2019    MCV 84.2 06/10/2017     06/10/2017         Health Maintenance   Topic Date Due    Varicella vaccine (1 of 2 - 2-dose childhood series) 01/15/1986    Pneumococcal 0-64 years Vaccine (1 of 1 - PPSV23) 01/15/1991    Hepatitis B vaccine (1 of 3 - Risk 3-dose series) 01/15/2004    DTaP/Tdap/Td vaccine (1 - Tdap) 01/15/2004    Cervical cancer screen  01/08/2019    Diabetic retinal exam  03/18/2020    Diabetic microalbuminuria test  11/08/2020    Lipid screen  11/08/2020    Diabetic foot exam  02/10/2021    A1C test (Diabetic or Prediabetic)  02/10/2021    Flu vaccine  Completed    HIV screen  Addressed    Hepatitis A vaccine  Aged Out    Hib vaccine  Aged Out    Meningococcal (ACWY) vaccine  Aged [employer] History   Administered Date(s) Administered    Influenza Vaccine, unspecified formulation 09/29/2016    Influenza Virus Vaccine 09/26/2017, 09/25/2019       Review of Systems   Constitutional: Negative for chills and fever. HENT: Negative. Respiratory: Negative for cough. Gastrointestinal: Negative for abdominal pain and nausea. Skin: Negative for rash. Neurological: Negative for light-headedness. Psychiatric/Behavioral: Negative. Objective:   Physical Exam  Constitutional:       General: She is not in acute distress. Appearance: She is well-developed. HENT:      Right Ear: Tympanic membrane normal.      Left Ear: Tympanic membrane normal.      Nose: Nose normal.   Cardiovascular:      Rate and Rhythm: Normal rate and regular rhythm. Pulses: Normal pulses. Heart sounds: Normal heart sounds, S1 normal and S2 normal. No murmur. No S3 sounds. Pulmonary:      Effort: Pulmonary effort is normal.      Breath sounds: Normal breath sounds. No decreased breath sounds, wheezing or rhonchi. Abdominal:      General: Bowel sounds are normal.      Palpations: Abdomen is soft. Tenderness: There is no abdominal tenderness. Neurological:      Mental Status: She is alert and oriented to person, place, and time. Psychiatric:         Behavior: Behavior normal.         Assessment:       Diagnosis Orders   1. Obesity, Class I, BMI 30-34. 9  phentermine (ADIPEX-P) 37.5 MG capsule   2. Type 2 diabetes mellitus without complication, without long-term current use of insulin ([address]     3. Metabolic syndrome             Plan:       5# lost!! Total lost 13#  Adipex #3  Establish exercise regime  Ok to stop Bydureon  RTO in 3 months - recheck A1C    Due to this being a TeleHealth encounter (During [facility] emergency), evaluation of the following organ systems was limited: Vitals/Constitutional/EENT/Resp/CV/GI//MS/Neuro/Skin/Heme-Lymph-Imm. Pursuant to the emergency declaration under the [facility], [address] [place] [address] authority and the [employer] Act, this Virtual Visit was conducted with patient's (and/or legal guardian's) consent, to reduce the patient's risk of exposure to COVID-19 and provide necessary medical care.   The patient (and/or legal guardian) has also been advised to contact this office for worsening conditions or problems, and seek emergency medical treatment and/or call 911 if deemed necessary. Services were provided through a video synchronous discussion virtually to substitute for in-person clinic visit. Patient and provider were located at their individual homes.     [unfilled]

## 2020-05-18 ENCOUNTER — TELEPHONE (OUTPATIENT)
Dept: FAMILY MEDICINE CLINIC | Age: 35
End: 2020-05-18

## 2020-06-30 RX ORDER — VENLAFAXINE HYDROCHLORIDE 150 MG/1
150 CAPSULE, EXTENDED RELEASE ORAL DAILY
Qty: 90 CAPSULE | Refills: 3 | Status: SHIPPED | OUTPATIENT
Start: 2020-06-30 | End: 2021-05-20 | Stop reason: SDUPTHER

## 2020-11-19 ENCOUNTER — OFFICE VISIT (OUTPATIENT)
Dept: FAMILY MEDICINE CLINIC | Age: 35
End: 2020-11-19
Payer: COMMERCIAL

## 2020-11-19 VITALS
BODY MASS INDEX: 34.3 KG/M2 | SYSTOLIC BLOOD PRESSURE: 134 MMHG | WEIGHT: 213.4 LBS | HEIGHT: 66 IN | HEART RATE: 104 BPM | RESPIRATION RATE: 18 BRPM | DIASTOLIC BLOOD PRESSURE: 84 MMHG | TEMPERATURE: 97.3 F

## 2020-11-19 PROCEDURE — 99213 OFFICE O/P EST LOW 20 MIN: CPT | Performed by: NURSE PRACTITIONER

## 2020-11-19 RX ORDER — OFLOXACIN 3 MG/ML
5 SOLUTION AURICULAR (OTIC) 2 TIMES DAILY
Qty: 10 ML | Refills: 0 | Status: SHIPPED | OUTPATIENT
Start: 2020-11-19 | End: 2020-11-26

## 2020-11-19 RX ORDER — AMOXICILLIN AND CLAVULANATE POTASSIUM 875; 125 MG/1; MG/1
1 TABLET, FILM COATED ORAL 2 TIMES DAILY WITH MEALS
Qty: 20 TABLET | Refills: 0 | Status: SHIPPED | OUTPATIENT
Start: 2020-11-19 | End: 2020-11-29

## 2020-11-19 RX ORDER — PSEUDOEPHEDRINE HCL 120 MG/1
120 TABLET, FILM COATED, EXTENDED RELEASE ORAL EVERY 12 HOURS
Qty: 14 TABLET | Refills: 0 | Status: SHIPPED | OUTPATIENT
Start: 2020-11-19 | End: 2020-11-26

## 2020-11-19 SDOH — ECONOMIC STABILITY: TRANSPORTATION INSECURITY
IN THE PAST 12 MONTHS, HAS LACK OF TRANSPORTATION KEPT YOU FROM MEETINGS, WORK, OR FROM GETTING THINGS NEEDED FOR DAILY LIVING?: NO

## 2020-11-19 SDOH — ECONOMIC STABILITY: INCOME INSECURITY: HOW HARD IS IT FOR YOU TO PAY FOR THE VERY BASICS LIKE FOOD, HOUSING, MEDICAL CARE, AND HEATING?: NOT HARD AT ALL

## 2020-11-19 SDOH — ECONOMIC STABILITY: FOOD INSECURITY: WITHIN THE PAST 12 MONTHS, THE FOOD YOU BOUGHT JUST DIDN'T LAST AND YOU DIDN'T HAVE MONEY TO GET MORE.: NEVER TRUE

## 2020-11-19 SDOH — ECONOMIC STABILITY: FOOD INSECURITY: WITHIN THE PAST 12 MONTHS, YOU WORRIED THAT YOUR FOOD WOULD RUN OUT BEFORE YOU GOT MONEY TO BUY MORE.: NEVER TRUE

## 2020-11-19 SDOH — ECONOMIC STABILITY: TRANSPORTATION INSECURITY
IN THE PAST 12 MONTHS, HAS THE LACK OF TRANSPORTATION KEPT YOU FROM MEDICAL APPOINTMENTS OR FROM GETTING MEDICATIONS?: NO

## 2020-11-19 ASSESSMENT — ENCOUNTER SYMPTOMS
ABDOMINAL PAIN: 0
COUGH: 1
SHORTNESS OF BREATH: 0
NAUSEA: 0
RHINORRHEA: 1

## 2020-11-19 NOTE — PROGRESS NOTES
Subjective:      Patient ID: Phyllistine Dancer is a 28 y.o. female. HPI: Acute for ear pain    Chief Complaint   Patient presents with    Other     Patient has been experiencing swollen glands and feels the right ear is plugged for the past 3 days.  Nasal Congestion     Patient also has been experiencing runny nose with drainage and cough and headache. Onset of 3 days with symptoms as above ear pressure. Right ear pain. Popping and crackling. PND. Cough. Head congestion. No fever. No body aches. No loss of taste or smell. No known COVID exposure. Vitals:    11/19/20 0947   BP: 134/84   Pulse: 104   Resp: 18   Temp: 97.3 °F (36.3 °C)        Review of Systems   Constitutional: Negative for chills, fatigue and fever. HENT: Positive for congestion, ear pain, hearing loss, postnasal drip and rhinorrhea. Respiratory: Positive for cough. Negative for shortness of breath. Cardiovascular: Negative for chest pain. Gastrointestinal: Negative for abdominal pain and nausea. Skin: Negative for rash. Neurological: Negative for dizziness, light-headedness and headaches. Psychiatric/Behavioral: Negative. Objective:   Physical Exam  Constitutional:       General: She is not in acute distress. HENT:      Right Ear: Drainage and swelling present. No tenderness. Left Ear: A middle ear effusion is present. Nose: Mucosal edema, congestion and rhinorrhea present. Right Sinus: No maxillary sinus tenderness. Left Sinus: No maxillary sinus tenderness. Mouth/Throat:      Pharynx: Pharyngeal swelling present. No oropharyngeal exudate or posterior oropharyngeal erythema. Eyes:      Pupils: Pupils are equal, round, and reactive to light. Neck:      Musculoskeletal: Normal range of motion and neck supple. Cardiovascular:      Rate and Rhythm: Normal rate and regular rhythm. Heart sounds: No murmur.    Pulmonary:      Effort: Pulmonary effort is normal. Breath sounds: No decreased breath sounds or wheezing. Abdominal:      General: Bowel sounds are normal. There is no distension. Palpations: Abdomen is soft. Tenderness: There is no abdominal tenderness. Musculoskeletal: Normal range of motion. General: No tenderness. Skin:     General: Skin is warm and dry. Findings: No rash. Assessment:       Diagnosis Orders   1. Acute swimmer's ear of right side  ofloxacin (FLOXIN) 0.3 % otic solution   2.  Rhinosinusitis  pseudoephedrine (SUDAFED 12 HOUR) 120 MG extended release tablet    amoxicillin-clavulanate (AUGMENTIN) 875-125 MG per tablet           Plan:      Ear Drops as directed  Sudafed  Rest and fluids  Augmentin for lack of resolution of symptoms over weekend  RTO if symptoms worsen or stay the same          Marlene Burgess, APRN - CNP

## 2021-02-24 ENCOUNTER — PATIENT MESSAGE (OUTPATIENT)
Dept: FAMILY MEDICINE CLINIC | Age: 36
End: 2021-02-24

## 2021-02-24 DIAGNOSIS — N76.0 ACUTE VAGINITIS: Primary | ICD-10-CM

## 2021-02-24 RX ORDER — FLUCONAZOLE 150 MG/1
150 TABLET ORAL ONCE
Qty: 2 TABLET | Refills: 0 | Status: SHIPPED | OUTPATIENT
Start: 2021-02-24 | End: 2021-02-24

## 2021-02-24 NOTE — TELEPHONE ENCOUNTER
From: Macey Diaz  To: SUNNY Fox CNP  Sent: 2/24/2021 11:07 AM EST  Subject: Prescription Question    Could you send me in a prescription for a yeast infection please? Thank you.

## 2021-04-09 DIAGNOSIS — E78.2 MIXED HYPERLIPIDEMIA: ICD-10-CM

## 2021-04-12 RX ORDER — ATORVASTATIN CALCIUM 20 MG/1
20 TABLET, FILM COATED ORAL DAILY
Qty: 90 TABLET | Refills: 3 | Status: SHIPPED | OUTPATIENT
Start: 2021-04-12 | End: 2022-02-16 | Stop reason: SDUPTHER

## 2021-05-16 ENCOUNTER — PATIENT MESSAGE (OUTPATIENT)
Dept: FAMILY MEDICINE CLINIC | Age: 36
End: 2021-05-16

## 2021-05-16 DIAGNOSIS — E11.9 TYPE 2 DIABETES MELLITUS WITHOUT COMPLICATION, WITHOUT LONG-TERM CURRENT USE OF INSULIN (HCC): ICD-10-CM

## 2021-05-17 NOTE — TELEPHONE ENCOUNTER
From: Tao Coughlin  To: SUNNY Godfrey CNP  Sent: 5/16/2021 9:25 AM EDT  Subject: Prescription Question    Can you send in a prescription for my Metformin please. Thank you!

## 2021-05-20 ENCOUNTER — OFFICE VISIT (OUTPATIENT)
Dept: FAMILY MEDICINE CLINIC | Age: 36
End: 2021-05-20
Payer: COMMERCIAL

## 2021-05-20 VITALS
HEART RATE: 104 BPM | SYSTOLIC BLOOD PRESSURE: 148 MMHG | BODY MASS INDEX: 35.59 KG/M2 | RESPIRATION RATE: 20 BRPM | DIASTOLIC BLOOD PRESSURE: 104 MMHG | WEIGHT: 220.5 LBS

## 2021-05-20 DIAGNOSIS — E11.9 TYPE 2 DIABETES MELLITUS WITHOUT COMPLICATION, WITHOUT LONG-TERM CURRENT USE OF INSULIN (HCC): ICD-10-CM

## 2021-05-20 DIAGNOSIS — E66.01 CLASS 2 SEVERE OBESITY WITH SERIOUS COMORBIDITY AND BODY MASS INDEX (BMI) OF 35.0 TO 35.9 IN ADULT, UNSPECIFIED OBESITY TYPE (HCC): ICD-10-CM

## 2021-05-20 DIAGNOSIS — E78.2 MIXED HYPERLIPIDEMIA: ICD-10-CM

## 2021-05-20 DIAGNOSIS — Z00.00 WELL ADULT EXAM: Primary | ICD-10-CM

## 2021-05-20 DIAGNOSIS — F41.1 GAD (GENERALIZED ANXIETY DISORDER): ICD-10-CM

## 2021-05-20 DIAGNOSIS — Z13.31 POSITIVE DEPRESSION SCREENING: ICD-10-CM

## 2021-05-20 PROCEDURE — G8431 POS CLIN DEPRES SCRN F/U DOC: HCPCS | Performed by: NURSE PRACTITIONER

## 2021-05-20 PROCEDURE — 99395 PREV VISIT EST AGE 18-39: CPT | Performed by: NURSE PRACTITIONER

## 2021-05-20 RX ORDER — VENLAFAXINE HYDROCHLORIDE 150 MG/1
150 CAPSULE, EXTENDED RELEASE ORAL DAILY
Qty: 90 CAPSULE | Refills: 3 | Status: SHIPPED | OUTPATIENT
Start: 2021-05-20 | End: 2021-09-09 | Stop reason: SDUPTHER

## 2021-05-20 RX ORDER — PHENTERMINE HYDROCHLORIDE 37.5 MG/1
37.5 CAPSULE ORAL EVERY MORNING
Qty: 30 CAPSULE | Refills: 0 | Status: SHIPPED | OUTPATIENT
Start: 2021-05-20 | End: 2021-06-19

## 2021-05-20 ASSESSMENT — ENCOUNTER SYMPTOMS
NAUSEA: 0
ABDOMINAL PAIN: 0
COUGH: 0

## 2021-05-20 ASSESSMENT — PATIENT HEALTH QUESTIONNAIRE - PHQ9
SUM OF ALL RESPONSES TO PHQ QUESTIONS 1-9: 13
5. POOR APPETITE OR OVEREATING: 3
6. FEELING BAD ABOUT YOURSELF - OR THAT YOU ARE A FAILURE OR HAVE LET YOURSELF OR YOUR FAMILY DOWN: 1
10. IF YOU CHECKED OFF ANY PROBLEMS, HOW DIFFICULT HAVE THESE PROBLEMS MADE IT FOR YOU TO DO YOUR WORK, TAKE CARE OF THINGS AT HOME, OR GET ALONG WITH OTHER PEOPLE: 0
4. FEELING TIRED OR HAVING LITTLE ENERGY: 3
SUM OF ALL RESPONSES TO PHQ QUESTIONS 1-9: 13

## 2021-05-20 ASSESSMENT — COLUMBIA-SUICIDE SEVERITY RATING SCALE - C-SSRS: 1. WITHIN THE PAST MONTH, HAVE YOU WISHED YOU WERE DEAD OR WISHED YOU COULD GO TO SLEEP AND NOT WAKE UP?: NO

## 2021-05-20 NOTE — PROGRESS NOTES
Subjective:      Patient ID: Micky Garza is a 39 y.o. female. HPI: 3 month Follow Up    Chief Complaint   Patient presents with    Annual Exam    Discuss Medications    Depression       Patient Active Problem List   Diagnosis    Asthma with bronchitis    Diabetes mellitus, type 2 ([address]    Hyperlipidemia    Metabolic syndrome    Obesity, Class I, BMI 30-34.9     ENT - Dr. Roma Berg - Dr. Whitney Hayes    Last seen 1 year ago. 20+ weight gain since last year. Feeling bluh. Mind racing. Worrier. Stress at home with children. on Effexor 150 mg XL. Denies CP, SOB or chest tightness    Wt Readings from Last 3 Encounters:   05/20/21 220 lb 8 oz (100 kg)   11/19/20 213 lb 6.4 oz (96.8 kg)   04/07/20 202 lb (91.6 kg)     BP elevated. No ACE at this time. Microalbuminuria NEG. BP Readings from Last 3 Encounters:   05/20/21 (!) 148/104   11/19/20 134/84   03/10/20 132/88       Due for annual labs. On Metformin 1000 mg BID only. Previous was on Tradjenta 5 mg and Bydureon. Intolerance to GLP-1. Lab Results   Component Value Date    LABA1C 6.3 02/10/2020    LABA1C 6.7 (H) 11/08/2019    LABA1C 5.9 11/05/2018     Lab Results   Component Value Date     11/08/2019       On Lipitor 20 mg. Tolerating well. LIPIDS well controlled.   Hysterectomy    No components found for: CHLPL  Lab Results   Component Value Date    TRIG 125 11/08/2019    TRIG 59 11/05/2018    TRIG 106 01/22/2018     Lab Results   Component Value Date    HDL 43 11/08/2019    HDL 43 11/05/2018    HDL 37 01/22/2018     Lab Results   Component Value Date    LDLCALC 63 11/05/2018    LDLCALC 89 01/22/2018    LDLCALC 71 03/22/2017     No results found for: LABVLDL      Chemistry        Component Value Date/Time     11/08/2019 0723    K 3.8 11/08/2019 0723     11/08/2019 0723    CO2 28 11/08/2019 0723    BUN 7 11/08/2019 0723    CREATININE 0.70 11/08/2019 0723        Component Value Date/Time    CALCIUM 9.10 11/08/2019 0723    ALKPHOS 75 01/22/2018 1055    AST 14 01/22/2018 1055    ALT 28 01/22/2018 1055    BILITOT 0.3 01/22/2018 1055            Lab Results   Component Value Date    TSH 1.280 05/14/2015       Lab Results   Component Value Date    WBC 16.2 (H) 06/10/2017    HGB 15.1 (H) 11/08/2019    HCT 45.7 (H) 11/08/2019    MCV 84.2 06/10/2017     06/10/2017         Health Maintenance   Topic Date Due    Hepatitis C screen  Never done    Varicella vaccine (1 of 2 - 2-dose childhood series) Never done    Pneumococcal 0-64 years Vaccine (1 of 2 - PPSV23) Never done    COVID-19 Vaccine (1) Never done    Hepatitis B vaccine (1 of 3 - Risk 3-dose series) Never done    DTaP/Tdap/Td vaccine (1 - Tdap) Never done    Cervical cancer screen  01/08/2019    Diabetic microalbuminuria test  11/08/2020    Lipid screen  11/08/2020    Diabetic foot exam  02/10/2021    A1C test (Diabetic or Prediabetic)  02/10/2021    Diabetic retinal exam  06/03/2021    Flu vaccine (Season Ended) 09/01/2021    HIV screen  Addressed    Hepatitis A vaccine  Aged Out    Hib vaccine  Aged Out    Meningococcal (ACWY) vaccine  Aged [employer] History   Administered Date(s) Administered    Influenza Vaccine, unspecified formulation 09/29/2016    Influenza Virus Vaccine 09/26/2017, 09/25/2019       Review of Systems   Constitutional: Positive for fatigue. Negative for chills and fever. HENT: Negative. Respiratory: Negative for cough. Gastrointestinal: Negative for abdominal pain and nausea. Skin: Negative for rash. Neurological: Negative for light-headedness. Psychiatric/Behavioral: Positive for dysphoric mood. Objective:   Physical Exam  Constitutional:       General: She is not in acute distress. Appearance: She is well-developed.    HENT:      Right Ear: Tympanic membrane normal.      Left Ear: Tympanic membrane normal.      Nose: Nose normal.   Cardiovascular:      Rate and Rhythm: Normal rate and regular rhythm. Pulses: Normal pulses. Heart sounds: Normal heart sounds, S1 normal and S2 normal. No murmur heard. No S3 sounds. Pulmonary:      Effort: Pulmonary effort is normal.      Breath sounds: Normal breath sounds. No decreased breath sounds, wheezing or rhonchi. Abdominal:      General: Bowel sounds are normal.      Palpations: Abdomen is soft. Tenderness: There is no abdominal tenderness. Neurological:      Mental Status: She is alert and oriented to person, place, and time. Psychiatric:         Behavior: Behavior normal.         Assessment:       Diagnosis Orders   1. Well adult exam     2. Positive depression screening  Positive Screen for Clinical Depression with a Documented Follow-up Plan    3. Type 2 diabetes mellitus without complication, without long-term current use of insulin (HCC)  Hemoglobin A1C    MICROALBUMIN, RANDOM URINE (W/O CREATININE)   4. Mixed hyperlipidemia  CBC    Lipid Panel    Comprehensive Metabolic Panel    TSH with Reflex   5. GREER (generalized anxiety disorder)             Plan:       Chronic conditions stable  Labs as above to monitior  Mood related to stress? Elevated sugars? Poor diet? Lack exercise? Refills as above  Hold on adjustment to Effexor - lifestyle changes prior  Adipex #1  RTO in 1 months      Frank Belts received counseling on the following healthy behaviors: nutrition and exercise  Reviewed prior labs and health maintenance  Continue current medications, diet and exercise. Discussed use, benefit, and side effects of prescribed medications. Barriers to medication compliance addressed. Patient given educational materials - see patient instructions  Was a self-tracking handout given in paper form or via [employer]? No:     Requested Prescriptions      No prescriptions requested or ordered in this encounter       All patient questions answered. Patient voiced understanding. Quality Measures    Body mass index is 35.59 kg/m². Elevated. Weight control planned discussed Healthy diet and regular exercise. BP: (!) 148/104 Blood pressure is normal. Treatment plan consists of No treatment change needed.     Lab Results   Component Value Date    LDLCALC 63 11/05/2018    LDLDIRECT 99 11/08/2019    (goal LDL reduction with dx if diabetes is 50% LDL reduction)      PHQ Scores 5/20/2021 1/7/2020 2/6/2019 5/29/2018 3/27/2017   PHQ2 Score 3 0 0 0 0   PHQ9 Score 13 0 0 0 0     Interpretation of Total Score Depression Severity: 1-4 = Minimal depression, 5-9 = Mild depression, 10-14 = Moderate depression, 15-19 = Moderately severe depression, 20-27 = Severe depression

## 2021-05-20 NOTE — PROGRESS NOTES
Chronic Disease Visit Information    BP Readings from Last 3 Encounters:   11/19/20 134/84   03/10/20 132/88   02/10/20 136/84          Hemoglobin A1C (%)   Date Value   02/10/2020 6.3   11/08/2019 6.7 (H)   11/05/2018 5.9     Microalbumin, Random Urine (mg/L)   Date Value   11/08/2019 421.6     LDL Calculated (mg/dL)   Date Value   11/05/2018 63     HDL (mg/dL)   Date Value   11/08/2019 43     BUN (mg/dL)   Date Value   11/08/2019 7     CREATININE (mg/dL)   Date Value   11/08/2019 0.70     Glucose (mg/dl)   Date Value   03/22/2017 117 (H)            Have you changed or started any medications since your last visit including any over-the-counter medicines, vitamins, or herbal medicines? no   Are you having any side effects from any of your medications? -  no  Have you stopped taking any of your medications? Is so, why? -  no    Have you seen any other physician or provider since your last visit? No  Have you had any other diagnostic tests since your last visit? No  Have you been seen in the emergency room and/or had an admission to a hospital since we last saw you? No  Have you had your annual diabetic retinal (eye) exam? Yes, 3/2021  Have you had your routine dental cleaning in the past 6 months? yes - 2wks ago    Have you activated your [employer] account? If not, what are your barriers?  Yes     Patient Care Team:  SUNNY Beltran CNP as PCP - General  SUNNY Beltran CNP as PCP - [facility] Provider         Medical History Review  Past Medical, Family, and Social History reviewed and does contribute to the patient presenting condition    Health Maintenance   Topic Date Due    Hepatitis C screen  Never done    Varicella vaccine (1 of 2 - 2-dose childhood series) Never done    Pneumococcal 0-64 years Vaccine (1 of 2 - PPSV23) Never done    COVID-19 Vaccine (1) Never done    Hepatitis B vaccine (1 of 3 - Risk 3-dose series) Never done    DTaP/Tdap/Td vaccine (1 - Tdap) Never done    Cervical cancer screen  01/08/2019    Diabetic microalbuminuria test  11/08/2020    Lipid screen  11/08/2020    Diabetic foot exam  02/10/2021    A1C test (Diabetic or Prediabetic)  02/10/2021    Diabetic retinal exam  06/03/2021    Flu vaccine (Season Ended) 09/01/2021    HIV screen  Addressed    Hepatitis A vaccine  Aged Out    Hib vaccine  Aged Out    Meningococcal (ACWY) vaccine  Aged Out       Tobacco Use Visit Information:    Have you changed or started any medications since your last visit including any over-the-counter medicines, vitamins, or herbal medicines? no   Are you having any side effects from any of your medications? -  no  Have you stopped taking any of your medications? Is so, why? -  no    Have you seen any other physician or provider since your last visit? No  Have you had any other diagnostic tests since your last visit? No  Have you been seen in the emergency room and/or had an admission to a hospital since we last saw you? No  Have you had your routine dental cleaning in the past 6 months? yes - 2wks ago    Have you activated your "[employer] account? If not, what are your barriers?  Yes     Patient Care Team:  SUNNY Nunn CNP as PCP - General  SUNNY Nunn CNP as PCP - [facility] Empaneled Provider    Current Tobacco Use    Social History     Tobacco Use   Smoking Status Current Every Day Smoker    Packs/day: 0.10   Smokeless Tobacco Never Used     Ready to quit: No  Counseling given: Yes     Are you motivated to quit? - no  If yes, have you set a date to quite? - no    Have you tried any anti-smoking aids in the past? -  no     1-800-QUIT-NOW (01 [phone])     Medical History Review  Past Medical, Family, and Social History reviewed and does contribute to the patient presenting condition    Health Maintenance   Topic Date Due    Hepatitis C screen  Never done    Varicella vaccine (1 of 2 - 2-dose childhood series) Never done    Pneumococcal 0-64 years Vaccine (1 of 2 - PPSV23) Never done    COVID-19 Vaccine (1) Never done    Hepatitis B vaccine (1 of 3 - Risk 3-dose series) Never done    DTaP/Tdap/Td vaccine (1 - Tdap) Never done    Cervical cancer screen  01/08/2019    Diabetic microalbuminuria test  11/08/2020    Lipid screen  11/08/2020    Diabetic foot exam  02/10/2021    A1C test (Diabetic or Prediabetic)  02/10/2021    Diabetic retinal exam  06/03/2021    Flu vaccine (Season Ended) 09/01/2021    HIV screen  Addressed    Hepatitis A vaccine  Aged Out    Hib vaccine  Aged Out    Meningococcal (ACWY) vaccine  Aged Out

## 2021-06-12 ENCOUNTER — HOSPITAL ENCOUNTER (OUTPATIENT)
Age: 36
Discharge: HOME | End: 2021-06-12
Payer: COMMERCIAL

## 2021-06-12 DIAGNOSIS — Z00.00 WELL ADULT EXAM: ICD-10-CM

## 2021-06-12 LAB
ALBUMIN SERPL-MCNC: 4.2 G/DL (ref 3.5–5.1)
ALP BLD-CCNC: 86 U/L (ref 38–126)
ALT SERPL-CCNC: 43 U/L (ref 11–66)
ANION GAP SERPL CALCULATED.3IONS-SCNC: 10 MEQ/L (ref 8–16)
AST SERPL-CCNC: 29 U/L (ref 5–40)
AVERAGE GLUCOSE: 180 MG/DL (ref 70–126)
BILIRUB SERPL-MCNC: 0.5 MG/DL (ref 0.3–1.2)
BUN BLDV-MCNC: 9 MG/DL (ref 7–22)
CALCIUM SERPL-MCNC: 9.5 MG/DL (ref 8.5–10.5)
CHLORIDE BLD-SCNC: 104 MEQ/L (ref 98–111)
CHOLESTEROL, TOTAL: 147 MG/DL (ref 100–199)
CO2: 24 MEQ/L (ref 23–33)
CREAT SERPL-MCNC: 0.6 MG/DL (ref 0.4–1.2)
CREATININE, URINE: 338.1 MG/DL
ERYTHROCYTE [DISTWIDTH] IN BLOOD BY AUTOMATED COUNT: 13.2 % (ref 11.5–14.5)
ERYTHROCYTE [DISTWIDTH] IN BLOOD BY AUTOMATED COUNT: 41.1 FL (ref 35–45)
GFR SERPL CREATININE-BSD FRML MDRD: > 90 ML/MIN/1.73M2
GLUCOSE BLD-MCNC: 139 MG/DL (ref 70–108)
HBA1C MFR BLD: 8 % (ref 4.4–6.4)
HCT VFR BLD CALC: 49 % (ref 37–47)
HDLC SERPL-MCNC: 42 MG/DL
HEMOGLOBIN: 15.8 GM/DL (ref 12–16)
LDL CHOLESTEROL CALCULATED: 83 MG/DL
MCH RBC QN AUTO: 28.1 PG (ref 26–33)
MCHC RBC AUTO-ENTMCNC: 32.2 GM/DL (ref 32.2–35.5)
MCV RBC AUTO: 87 FL (ref 81–99)
MICROALBUMIN UR-MCNC: 44.5 MG/DL
MICROALBUMIN/CREAT UR-RTO: 132 MG/G (ref 0–30)
PLATELET # BLD: 241 THOU/MM3 (ref 130–400)
PMV BLD AUTO: 9.8 FL (ref 9.4–12.4)
POTASSIUM SERPL-SCNC: 4.1 MEQ/L (ref 3.5–5.2)
RBC # BLD: 5.63 MILL/MM3 (ref 4.2–5.4)
SODIUM BLD-SCNC: 138 MEQ/L (ref 135–145)
TOTAL PROTEIN: 7.3 G/DL (ref 6.1–8)
TRIGL SERPL-MCNC: 111 MG/DL (ref 0–199)
TSH SERPL DL<=0.05 MIU/L-ACNC: 0.78 UIU/ML (ref 0.4–4.2)
WBC # BLD: 10.3 THOU/MM3 (ref 4.8–10.8)

## 2021-06-12 PROCEDURE — 82043 UR ALBUMIN QUANTITATIVE: CPT

## 2021-06-12 PROCEDURE — 36415 COLL VENOUS BLD VENIPUNCTURE: CPT

## 2021-06-12 PROCEDURE — 80061 LIPID PANEL: CPT

## 2021-06-12 PROCEDURE — 83036 HEMOGLOBIN GLYCOSYLATED A1C: CPT

## 2021-06-12 PROCEDURE — 85027 COMPLETE CBC AUTOMATED: CPT

## 2021-06-12 PROCEDURE — 80053 COMPREHEN METABOLIC PANEL: CPT

## 2021-06-12 PROCEDURE — 84443 ASSAY THYROID STIM HORMONE: CPT

## 2021-06-21 ENCOUNTER — OFFICE VISIT (OUTPATIENT)
Dept: FAMILY MEDICINE CLINIC | Age: 36
End: 2021-06-21
Payer: COMMERCIAL

## 2021-06-21 DIAGNOSIS — E11.9 TYPE 2 DIABETES MELLITUS WITHOUT COMPLICATION, WITHOUT LONG-TERM CURRENT USE OF INSULIN (HCC): ICD-10-CM

## 2021-06-21 DIAGNOSIS — E78.2 MIXED HYPERLIPIDEMIA: ICD-10-CM

## 2021-06-21 DIAGNOSIS — E66.01 CLASS 2 SEVERE OBESITY WITH SERIOUS COMORBIDITY AND BODY MASS INDEX (BMI) OF 35.0 TO 35.9 IN ADULT, UNSPECIFIED OBESITY TYPE (HCC): Primary | ICD-10-CM

## 2021-06-21 DIAGNOSIS — E88.81 METABOLIC SYNDROME: ICD-10-CM

## 2021-06-21 DIAGNOSIS — F41.1 GAD (GENERALIZED ANXIETY DISORDER): ICD-10-CM

## 2021-06-21 PROCEDURE — 4004F PT TOBACCO SCREEN RCVD TLK: CPT | Performed by: NURSE PRACTITIONER

## 2021-06-21 PROCEDURE — G8427 DOCREV CUR MEDS BY ELIG CLIN: HCPCS | Performed by: NURSE PRACTITIONER

## 2021-06-21 PROCEDURE — 2022F DILAT RTA XM EVC RTNOPTHY: CPT | Performed by: NURSE PRACTITIONER

## 2021-06-21 PROCEDURE — 99214 OFFICE O/P EST MOD 30 MIN: CPT | Performed by: NURSE PRACTITIONER

## 2021-06-21 PROCEDURE — G8417 CALC BMI ABV UP PARAM F/U: HCPCS | Performed by: NURSE PRACTITIONER

## 2021-06-21 PROCEDURE — 3052F HG A1C>EQUAL 8.0%<EQUAL 9.0%: CPT | Performed by: NURSE PRACTITIONER

## 2021-06-21 RX ORDER — DULAGLUTIDE 1.5 MG/.5ML
1.5 INJECTION, SOLUTION SUBCUTANEOUS WEEKLY
Qty: 4 PEN | Refills: 2 | Status: SHIPPED | OUTPATIENT
Start: 2021-06-21 | End: 2021-09-09 | Stop reason: SDUPTHER

## 2021-06-21 RX ORDER — PHENTERMINE HYDROCHLORIDE 37.5 MG/1
37.5 CAPSULE ORAL EVERY MORNING
Qty: 30 CAPSULE | Refills: 0 | Status: SHIPPED | OUTPATIENT
Start: 2021-06-21 | End: 2021-07-20 | Stop reason: SDUPTHER

## 2021-06-21 ASSESSMENT — ENCOUNTER SYMPTOMS
COUGH: 0
NAUSEA: 0
ABDOMINAL PAIN: 0

## 2021-06-21 NOTE — PROGRESS NOTES
Subjective:      Patient ID: Ang Beckett is a 39 y.o. female. HPI: 3 month Follow Up    Chief Complaint   Patient presents with    1 Month Follow-Up    Discuss Labs     Adipex #1. 6# lost.  Started due to 20# weight gain since last year. A1C increase. Does feel better since working out more, eating better - physically and mentally. On Effexor 150 mg XL. Wt Readings from Last 3 Encounters:   06/21/21 214 lb (97.1 kg)   05/20/21 220 lb 8 oz (100 kg)   11/19/20 213 lb 6.4 oz (96.8 kg)       Patient Active Problem List   Diagnosis    Asthma with bronchitis    Diabetes mellitus, type 2 ([address]    Hyperlipidemia    Metabolic syndrome    Obesity, Class I, BMI 30-34.9     ENT - Dr. Lady Cline - Dr. Cindy Lua    Denies CP, SOB or chest tightness    Wt Readings from Last 3 Encounters:   06/21/21 214 lb (97.1 kg)   05/20/21 220 lb 8 oz (100 kg)   11/19/20 213 lb 6.4 oz (96.8 kg)     BP stable. No ACE at this time. Microalbuminuria NEG. BP Readings from Last 3 Encounters:   06/21/21 138/86   05/20/21 (!) 148/104   11/19/20 134/84       Labs reviewed. On Metformin 1000 mg BID only. Previous was on Tradjenta 5 mg and Bydureon. Intolerance to GLP-1. Lab Results   Component Value Date    LABA1C 8.0 (H) 06/12/2021    LABA1C 6.3 02/10/2020    LABA1C 6.7 (H) 11/08/2019     Lab Results   Component Value Date     11/08/2019       On Lipitor 20 mg. Tolerating well. LIPIDS well controlled.   Hysterectomy    No components found for: CHLPL  Lab Results   Component Value Date    TRIG 111 06/12/2021    TRIG 125 11/08/2019    TRIG 59 11/05/2018     Lab Results   Component Value Date    HDL 42 06/12/2021    HDL 43 11/08/2019    HDL 43 11/05/2018     Lab Results   Component Value Date    LDLCALC 83 06/12/2021    LDLCALC 63 11/05/2018    LDLCALC 89 01/22/2018     No results found for: LABVLDL      Chemistry        Component Value Date/Time     06/12/2021 1016    K 4.1 06/12/2021 1016    CL 104 06/12/2021 1016    CO2 24 06/12/2021 1016    BUN 9 06/12/2021 1016    CREATININE 0.6 06/12/2021 1016        Component Value Date/Time    CALCIUM 9.5 06/12/2021 1016    ALKPHOS 86 06/12/2021 1016    AST 29 06/12/2021 1016    ALT 43 06/12/2021 1016    BILITOT 0.5 06/12/2021 1016            Lab Results   Component Value Date    TSH 0.782 06/12/2021       Lab Results   Component Value Date    WBC 10.3 06/12/2021    HGB 15.8 06/12/2021    HCT 49.0 (H) 06/12/2021    MCV 87.0 06/12/2021     06/12/2021         Health Maintenance   Topic Date Due    Hepatitis C screen  Never done    Varicella vaccine (1 of 2 - 2-dose childhood series) Never done    Pneumococcal 0-64 years Vaccine (1 of 2 - PPSV23) Never done    COVID-19 Vaccine (1) Never done    Hepatitis B vaccine (1 of 3 - Risk 3-dose series) Never done    DTaP/Tdap/Td vaccine (1 - Tdap) Never done    Cervical cancer screen  01/08/2019    Diabetic foot exam  02/10/2021    Diabetic retinal exam  06/03/2021    Flu vaccine (Season Ended) 09/01/2021    A1C test (Diabetic or Prediabetic)  06/12/2022    Diabetic microalbuminuria test  06/12/2022    Lipid screen  06/12/2022    HIV screen  Addressed    Hepatitis A vaccine  Aged Out    Hib vaccine  Aged Out    Meningococcal (ACWY) vaccine  Aged [employer] History   Administered Date(s) Administered    Influenza Vaccine, unspecified formulation 09/29/2016    Influenza Virus Vaccine 09/26/2017, 09/25/2019       Review of Systems   Constitutional: Positive for fatigue. Negative for chills and fever. HENT: Negative. Respiratory: Negative for cough. Gastrointestinal: Negative for abdominal pain and nausea. Skin: Negative for rash. Neurological: Negative for light-headedness. Psychiatric/Behavioral: Positive for dysphoric mood. Objective:   Physical Exam  Constitutional:       General: She is not in acute distress. Appearance: She is well-developed.    HENT:      Right Ear: Tympanic membrane normal.      Left Ear: Tympanic membrane normal.      Nose: Nose normal.   Cardiovascular:      Rate and Rhythm: Normal rate and regular rhythm. Pulses: Normal pulses. Heart sounds: Normal heart sounds, S1 normal and S2 normal. No murmur heard. No S3 sounds. Pulmonary:      Effort: Pulmonary effort is normal.      Breath sounds: Normal breath sounds. No decreased breath sounds, wheezing or rhonchi. Abdominal:      General: Bowel sounds are normal.      Palpations: Abdomen is soft. Tenderness: There is no abdominal tenderness. Neurological:      Mental Status: She is alert and oriented to person, place, and time. Psychiatric:         Behavior: Behavior normal.         Assessment:       Diagnosis Orders   1. Class 2 severe obesity with serious comorbidity and body mass index (BMI) of 35.0 to 35.9 in adult, unspecified obesity type (HCC)  phentermine (ADIPEX-P) 37.5 MG capsule   2. Type 2 diabetes mellitus without complication, without long-term current use of insulin (HCC)  Dulaglutide (TRULICITY) 1.5 AC/4.1VW SOPN   3. Mixed hyperlipidemia     4. Metabolic syndrome     5. GREER (generalized anxiety disorder)             Plan:       6# lost   Adipex #2  Labs reviewed  Chronic conditions stable except DM  Add on Trulicity Weekly  RTO in 1 months      Al Nine received counseling on the following healthy behaviors: nutrition and exercise  Reviewed prior labs and health maintenance  Continue current medications, diet and exercise. Discussed use, benefit, and side effects of prescribed medications. Barriers to medication compliance addressed. Patient given educational materials - see patient instructions  Was a self-tracking handout given in paper form or via [employer]?  No:     Requested Prescriptions     Signed Prescriptions Disp Refills    Dulaglutide (TRULICITY) 1.5 FW/3.6HD SOPN 4 pen 2     Sig: Inject 1.5 mg into the skin once a week    phentermine (ADIPEX-P) 37.5 MG capsule

## 2021-06-22 VITALS
DIASTOLIC BLOOD PRESSURE: 86 MMHG | HEART RATE: 96 BPM | SYSTOLIC BLOOD PRESSURE: 138 MMHG | TEMPERATURE: 98.2 F | BODY MASS INDEX: 34.54 KG/M2 | WEIGHT: 214 LBS | RESPIRATION RATE: 20 BRPM

## 2021-07-20 ENCOUNTER — OFFICE VISIT (OUTPATIENT)
Dept: FAMILY MEDICINE CLINIC | Age: 36
End: 2021-07-20
Payer: COMMERCIAL

## 2021-07-20 VITALS
DIASTOLIC BLOOD PRESSURE: 90 MMHG | RESPIRATION RATE: 16 BRPM | BODY MASS INDEX: 32.83 KG/M2 | SYSTOLIC BLOOD PRESSURE: 152 MMHG | WEIGHT: 203.4 LBS | HEART RATE: 80 BPM

## 2021-07-20 DIAGNOSIS — E66.01 CLASS 2 SEVERE OBESITY WITH SERIOUS COMORBIDITY AND BODY MASS INDEX (BMI) OF 35.0 TO 35.9 IN ADULT, UNSPECIFIED OBESITY TYPE (HCC): Primary | ICD-10-CM

## 2021-07-20 DIAGNOSIS — E88.81 METABOLIC SYNDROME: ICD-10-CM

## 2021-07-20 DIAGNOSIS — E78.2 MIXED HYPERLIPIDEMIA: ICD-10-CM

## 2021-07-20 DIAGNOSIS — F41.1 GAD (GENERALIZED ANXIETY DISORDER): ICD-10-CM

## 2021-07-20 DIAGNOSIS — E11.9 TYPE 2 DIABETES MELLITUS WITHOUT COMPLICATION, WITHOUT LONG-TERM CURRENT USE OF INSULIN (HCC): ICD-10-CM

## 2021-07-20 PROCEDURE — 3052F HG A1C>EQUAL 8.0%<EQUAL 9.0%: CPT | Performed by: NURSE PRACTITIONER

## 2021-07-20 PROCEDURE — 2022F DILAT RTA XM EVC RTNOPTHY: CPT | Performed by: NURSE PRACTITIONER

## 2021-07-20 PROCEDURE — G8427 DOCREV CUR MEDS BY ELIG CLIN: HCPCS | Performed by: NURSE PRACTITIONER

## 2021-07-20 PROCEDURE — 4004F PT TOBACCO SCREEN RCVD TLK: CPT | Performed by: NURSE PRACTITIONER

## 2021-07-20 PROCEDURE — G8417 CALC BMI ABV UP PARAM F/U: HCPCS | Performed by: NURSE PRACTITIONER

## 2021-07-20 PROCEDURE — 99214 OFFICE O/P EST MOD 30 MIN: CPT | Performed by: NURSE PRACTITIONER

## 2021-07-20 RX ORDER — PHENTERMINE HYDROCHLORIDE 37.5 MG/1
37.5 CAPSULE ORAL EVERY MORNING
Qty: 30 CAPSULE | Refills: 0 | Status: SHIPPED | OUTPATIENT
Start: 2021-07-20 | End: 2021-08-19

## 2021-07-20 ASSESSMENT — ENCOUNTER SYMPTOMS
ABDOMINAL PAIN: 0
COUGH: 0
NAUSEA: 0

## 2021-07-20 NOTE — PROGRESS NOTES
Subjective:      Patient ID: Wilbur Nixon is a 39 y.o. female. HPI: 1 month Follow Up    Chief Complaint   Patient presents with    1 Month Follow-Up     Adipex #2 down 11 lbs, 17 lbs total weight loss    Diabetes       Adipex #2. 11# lost.   17# lost total.   Appetite supplement. Feels Trulicity has helped with appetite. Does feel better since working out more, eating better - physically and mentally. On Effexor 150 mg XL. Wt Readings from Last 3 Encounters:   07/20/21 203 lb 6.4 oz (92.3 kg)   06/21/21 214 lb (97.1 kg)   05/20/21 220 lb 8 oz (100 kg)       Patient Active Problem List   Diagnosis    Asthma with bronchitis    Diabetes mellitus, type 2 ([address]    Hyperlipidemia    Metabolic syndrome    Obesity, Class I, BMI 30-34.9       ENT - Dr. Fortunato Alva - Dr. Lary Cohen    Denies CP, SOB or chest tightness    Wt Readings from Last 3 Encounters:   07/20/21 203 lb 6.4 oz (92.3 kg)   06/21/21 214 lb (97.1 kg)   05/20/21 220 lb 8 oz (100 kg)     BP stable. No ACE at this time. Microalbuminuria NEG. BP Readings from Last 3 Encounters:   07/20/21 (!) 152/90   06/21/21 138/86   05/20/21 (!) 148/104       Labs reviewed. On Metformin 3233 mg BID and Trulicity 1.5 mg     Lab Results   Component Value Date    LABA1C 8.0 (H) 06/12/2021    LABA1C 6.3 02/10/2020    LABA1C 6.7 (H) 11/08/2019     Lab Results   Component Value Date     11/08/2019       On Lipitor 20 mg. Tolerating well. LIPIDS well controlled.   Hysterectomy    No components found for: CHLPL  Lab Results   Component Value Date    TRIG 111 06/12/2021    TRIG 125 11/08/2019    TRIG 59 11/05/2018     Lab Results   Component Value Date    HDL 42 06/12/2021    HDL 43 11/08/2019    HDL 43 11/05/2018     Lab Results   Component Value Date    LDLCALC 83 06/12/2021    LDLCALC 63 11/05/2018    LDLCALC 89 01/22/2018     No results found for: LABVLDL      Chemistry        Component Value Date/Time     06/12/2021 1016    K 4.1 06/12/2021 1016     06/12/2021 1016    CO2 24 06/12/2021 1016    BUN 9 06/12/2021 1016    CREATININE 0.6 06/12/2021 1016        Component Value Date/Time    CALCIUM 9.5 06/12/2021 1016    ALKPHOS 86 06/12/2021 1016    AST 29 06/12/2021 1016    ALT 43 06/12/2021 1016    BILITOT 0.5 06/12/2021 1016            Lab Results   Component Value Date    TSH 0.782 06/12/2021       Lab Results   Component Value Date    WBC 10.3 06/12/2021    HGB 15.8 06/12/2021    HCT 49.0 (H) 06/12/2021    MCV 87.0 06/12/2021     06/12/2021         Health Maintenance   Topic Date Due    Hepatitis C screen  Never done    Varicella vaccine (1 of 2 - 2-dose childhood series) Never done    Pneumococcal 0-64 years Vaccine (1 of 2 - PPSV23) Never done    COVID-19 Vaccine (1) Never done    Hepatitis B vaccine (1 of 3 - Risk 3-dose series) Never done    DTaP/Tdap/Td vaccine (1 - Tdap) Never done    Cervical cancer screen  01/08/2019    Diabetic retinal exam  06/03/2021    Flu vaccine (1) 09/01/2021    A1C test (Diabetic or Prediabetic)  06/12/2022    Diabetic microalbuminuria test  06/12/2022    Lipid screen  06/12/2022    Diabetic foot exam  07/20/2022    HIV screen  Addressed    Hepatitis A vaccine  Aged Out    Hib vaccine  Aged Out    Meningococcal (ACWY) vaccine  Aged [employer] History   Administered Date(s) Administered    Influenza Vaccine, unspecified formulation 09/29/2016    Influenza Virus Vaccine 09/26/2017, 09/25/2019       Review of Systems   Constitutional: Negative for chills and fever. HENT: Negative. Respiratory: Negative for cough. Gastrointestinal: Negative for abdominal pain and nausea. Skin: Negative for rash. Neurological: Negative for light-headedness. Psychiatric/Behavioral: Positive for dysphoric mood. Objective:   Physical Exam  Constitutional:       General: She is not in acute distress. Appearance: She is well-developed.    HENT:      Right Ear: Tympanic membrane normal.      Left Ear: Tympanic membrane normal.      Nose: Nose normal.   Cardiovascular:      Rate and Rhythm: Normal rate and regular rhythm. Pulses: Normal pulses. Heart sounds: Normal heart sounds, S1 normal and S2 normal. No murmur heard. No S3 sounds. Pulmonary:      Effort: Pulmonary effort is normal.      Breath sounds: Normal breath sounds. No decreased breath sounds, wheezing or rhonchi. Abdominal:      General: Bowel sounds are normal.      Palpations: Abdomen is soft. Tenderness: There is no abdominal tenderness. Neurological:      Mental Status: She is alert and oriented to person, place, and time. Psychiatric:         Behavior: Behavior normal.         Assessment:       Diagnosis Orders   1. Class 2 severe obesity with serious comorbidity and body mass index (BMI) of 35.0 to 35.9 in adult, unspecified obesity type (HCC)  phentermine (ADIPEX-P) 37.5 MG capsule    HM DIABETES FOOT EXAM    Hemoglobin A1C   2. Type 2 diabetes mellitus without complication, without long-term current use of insulin ([address]     3. Mixed hyperlipidemia     4. GREER (generalized anxiety disorder)     5. Metabolic syndrome             Plan:       11# lost!!  17# lost  Adipex #3  Diet and exercise  Labs reviewed  Chronic conditions stable  Repeat Labs in 3 months  RTO in 3 months      Saranya Bonilla received counseling on the following healthy behaviors: nutrition and exercise  Reviewed prior labs and health maintenance  Continue current medications, diet and exercise. Discussed use, benefit, and side effects of prescribed medications. Barriers to medication compliance addressed. Patient given educational materials - see patient instructions  Was a self-tracking handout given in paper form or via [employer]? No:     Requested Prescriptions     Signed Prescriptions Disp Refills    phentermine (ADIPEX-P) 37.5 MG capsule 30 capsule 0     Sig: Take 1 capsule by mouth every morning for 30 days.        All patient questions answered. Patient voiced understanding. Quality Measures    Body mass index is 32.83 kg/m². Elevated. Weight control planned discussed Healthy diet and regular exercise. BP: (!) 152/90 Blood pressure is normal. Treatment plan consists of No treatment change needed.     Lab Results   Component Value Date    LDLCALC 83 06/12/2021    LDLDIRECT 99 11/08/2019    (goal LDL reduction with dx if diabetes is 50% LDL reduction)      PHQ Scores 5/20/2021 1/7/2020 2/6/2019 5/29/2018 3/27/2017   PHQ2 Score 3 0 0 0 0   PHQ9 Score 13 0 0 0 0     Interpretation of Total Score Depression Severity: 1-4 = Minimal depression, 5-9 = Mild depression, 10-14 = Moderate depression, 15-19 = Moderately severe depression, 20-27 = Severe depression

## 2021-07-20 NOTE — PATIENT INSTRUCTIONS
You may receive a survey about your visit with us today. The feedback from our patients helps us identify what is working well and where the service to all patients can be enhanced. Thank you! Tobacco Cessation Programs     Telephonic behavior modification  Maria C Sanjuana ([phone])   Counseling service for those who are ready to quit using tobacco.     Available for uninsured [place] residents, [place] recipients, pregnant women, or patients whose health plans or employers are members of the [address] [place] [address] behavior modification   http://[place]. [employer]uitlogix. org   Online support program to help patients through each step of the quitting process. Available 24 hours a day 7 days a week. Provides up to date researched based tool, step-by-step guides, and motivational messages. Online behavior modification   www.lungusa.org/stop-smoking/how-to-quit   HelpLine: 1-800-LUNG-[place] (836-7682)   Email questions to:  Vielka@[email or web address] org    Website offers resources to help tobacco users figure out their reasons for quitting and then take the big step of quitting for good. Hypnosis   Location: [facility] [place]   Contact: Sully Last, PhD at [phone]   Hypnosis for tobacco cessation   Cost $225 for the initial session and $175 for each session afterwards. Most patients require 6-8 sessions. There is the option to submit through the patients insurance. Hypnosis and behavior modification   Location: [facility] [address] [place] [employer], [place]   Contact: Brayan Vickers, PhD at [phone]  [facility] and hypnosis for nicotine addition   Cost: For uninsured patients:  Please call above phone number  Cost for insured patients depends on patients insurance plan.     Behavior modification   Location: [facility], [address] [facility] [address] [place] [employer] [place], [address]: [place] [address] include four one-on-one appointments between the patient and a respiratory therapist.  The four appointments span over three weeks. The respiratory therapist schedules one of the appointments to occur 48 hours after the patients quit date.  Cost $100 total for the four sessions. Tobacco cessation products are not included in the cost and are not provided by [facility].         Patient Education        Learning About Low-Carbohydrate Diets  What is a low-carbohydrate diet? A low-carbohydrate (or \"low-carb\") diet limits foods and drinks that have carbohydrates. This includes grains, fruits, milk and yogurt, and starchy vegetables like potatoes, beans, and corn. It also avoids foods and drinks that have added sugar. Instead, low-carb diets include foods that are high in protein and fat. Why might you follow a low-carb diet? Low-carb diets may be used for a variety of reasons, such as for weight loss. People who have diabetes may use a low-carb diet to help manage their blood sugar levels. What should you do before you start the diet? Talk to your doctor before you try any diet. This is even more important if you have health problems like kidney disease, heart disease, or diabetes. Your doctor may suggest that you meet with a registered dietitian. A dietitian can help you make an eating plan that works for you. What foods do you eat on a low-carb diet? On a low-carb diet, you choose foods that are high in protein and fat. Examples of these are:  · Meat, poultry, and fish. · Eggs. · Nuts, such as walnuts, pecans, almonds, and peanuts. · Peanut butter and other nut butters. · Tofu. · Avocado. · Lance Bhupendra. · Non-starchy vegetables like broccoli, cauliflower, green beans, mushrooms, peppers, lettuce, and spinach. · Unsweetened non-dairy milks like almond milk and coconut milk. · Cheese, cottage cheese, and cream cheese. Current as of: December 17, 2020               Content Version: 12.9  © 6910-3220 Healthwise, Incorporated.    Care instructions adapted under license by [facility] ([facility]). If you have questions about a medical condition or this instruction, always ask your healthcare professional. [place] [address] any warranty or liability for your use of this information.

## 2021-09-09 DIAGNOSIS — E11.9 TYPE 2 DIABETES MELLITUS WITHOUT COMPLICATION, WITHOUT LONG-TERM CURRENT USE OF INSULIN (HCC): ICD-10-CM

## 2021-09-09 DIAGNOSIS — F41.1 GAD (GENERALIZED ANXIETY DISORDER): ICD-10-CM

## 2021-09-09 RX ORDER — DULAGLUTIDE 1.5 MG/.5ML
1.5 INJECTION, SOLUTION SUBCUTANEOUS WEEKLY
Qty: 4 PEN | Refills: 2 | Status: SHIPPED | OUTPATIENT
Start: 2021-09-09 | End: 2021-12-02 | Stop reason: SDUPTHER

## 2021-09-09 RX ORDER — VENLAFAXINE HYDROCHLORIDE 150 MG/1
150 CAPSULE, EXTENDED RELEASE ORAL DAILY
Qty: 90 CAPSULE | Refills: 3 | Status: SHIPPED | OUTPATIENT
Start: 2021-09-09 | End: 2022-09-06

## 2021-10-20 ENCOUNTER — OFFICE VISIT (OUTPATIENT)
Dept: FAMILY MEDICINE CLINIC | Age: 36
End: 2021-10-20
Payer: COMMERCIAL

## 2021-10-20 VITALS
WEIGHT: 206.4 LBS | BODY MASS INDEX: 33.31 KG/M2 | HEART RATE: 84 BPM | DIASTOLIC BLOOD PRESSURE: 72 MMHG | RESPIRATION RATE: 16 BRPM | SYSTOLIC BLOOD PRESSURE: 134 MMHG

## 2021-10-20 DIAGNOSIS — F41.1 GAD (GENERALIZED ANXIETY DISORDER): ICD-10-CM

## 2021-10-20 DIAGNOSIS — E11.9 TYPE 2 DIABETES MELLITUS WITHOUT COMPLICATION, WITHOUT LONG-TERM CURRENT USE OF INSULIN (HCC): Primary | ICD-10-CM

## 2021-10-20 DIAGNOSIS — E78.2 MIXED HYPERLIPIDEMIA: ICD-10-CM

## 2021-10-20 DIAGNOSIS — E66.01 CLASS 2 SEVERE OBESITY WITH SERIOUS COMORBIDITY AND BODY MASS INDEX (BMI) OF 35.0 TO 35.9 IN ADULT, UNSPECIFIED OBESITY TYPE (HCC): ICD-10-CM

## 2021-10-20 DIAGNOSIS — E88.81 METABOLIC SYNDROME: ICD-10-CM

## 2021-10-20 LAB — HBA1C MFR BLD: 6 %

## 2021-10-20 PROCEDURE — G8427 DOCREV CUR MEDS BY ELIG CLIN: HCPCS | Performed by: NURSE PRACTITIONER

## 2021-10-20 PROCEDURE — 83036 HEMOGLOBIN GLYCOSYLATED A1C: CPT | Performed by: NURSE PRACTITIONER

## 2021-10-20 PROCEDURE — 2022F DILAT RTA XM EVC RTNOPTHY: CPT | Performed by: NURSE PRACTITIONER

## 2021-10-20 PROCEDURE — 99214 OFFICE O/P EST MOD 30 MIN: CPT | Performed by: NURSE PRACTITIONER

## 2021-10-20 PROCEDURE — G8484 FLU IMMUNIZE NO ADMIN: HCPCS | Performed by: NURSE PRACTITIONER

## 2021-10-20 PROCEDURE — G8417 CALC BMI ABV UP PARAM F/U: HCPCS | Performed by: NURSE PRACTITIONER

## 2021-10-20 PROCEDURE — 4004F PT TOBACCO SCREEN RCVD TLK: CPT | Performed by: NURSE PRACTITIONER

## 2021-10-20 PROCEDURE — 3044F HG A1C LEVEL LT 7.0%: CPT | Performed by: NURSE PRACTITIONER

## 2021-10-20 ASSESSMENT — ENCOUNTER SYMPTOMS
COUGH: 0
ABDOMINAL PAIN: 0
NAUSEA: 0

## 2021-10-20 NOTE — PATIENT INSTRUCTIONS
You may receive a survey about your visit with us today. The feedback from our patients helps us identify what is working well and where the service to all patients can be enhanced. Thank you! Tobacco Cessation Programs     Telephonic behavior modification  Orlando Lanie ([phone])   Counseling service for those who are ready to quit using tobacco.     Available for uninsured [place] residents, [place] recipients, pregnant women, or patients whose health plans or employers are members of the [address] [place] [address] behavior modification   http://[place]. Quitlogix. org   Online support program to help patients through each step of the quitting process. Available 24 hours a day 7 days a week. Provides up to date researched based tool, step-by-step guides, and motivational messages. Online behavior modification   www.lungusa.org/stop-smoking/how-to-quit   HelpLine: 1-800-LUNG-[place] (712-9684)   Email questions to:  John@[employer]. org    Website offers resources to help tobacco users figure out their reasons for quitting and then take the big step of quitting for good. Hypnosis   Location: [address] [place] [address]   Contact: Zheng Garcia, PhD at [phone]   Hypnosis for tobacco cessation   Cost $225 for the initial session and $175 for each session afterwards. Most patients require 6-8 sessions. There is the option to submit through the patients insurance. Hypnosis and behavior modification   Location: [place] [address],  [address], [place]   Contact: Ursula Mcmillan, PhD at [phone]  [facility] and hypnosis for nicotine addition   Cost: For uninsured patients:  Please call above phone number  Cost for insured patients depends on patients insurance plan.     Behavior modification   Location: [facility], [address] [facility] [address] [facility], [address]: [place] [address] include four one-on-one appointments between the patient and a respiratory therapist.  The four appointments span over three weeks. The respiratory therapist schedules one of the appointments to occur 48 hours after the patients quit date.  Cost $100 total for the four sessions.   Tobacco cessation products are not included in the cost and are not provided by [facility].

## 2021-10-20 NOTE — PROGRESS NOTES
Subjective:      Patient ID: Evelyne Morales is a 39 y.o. female. HPI: 1 month Follow Up    Chief Complaint   Patient presents with    3 Month Follow-Up    Diabetes       Does feel better since working out more, eating better - physically and mentally. On Effexor 150 mg XL. Wt Readings from Last 3 Encounters:   10/20/21 206 lb 6.4 oz (93.6 kg)   07/20/21 203 lb 6.4 oz (92.3 kg)   06/21/21 214 lb (97.1 kg)       Maintained weight loss from Adipex 3 months ago. Patient Active Problem List   Diagnosis    Asthma with bronchitis    Diabetes mellitus, type 2 ([address]    Hyperlipidemia    Metabolic syndrome    Obesity, Class I, BMI 30-34.9       ENT - Dr. Alpesh Regan - Dr. Fidelina Fragoso    Denies CP, SOB or chest tightness    Wt Readings from Last 3 Encounters:   10/20/21 206 lb 6.4 oz (93.6 kg)   07/20/21 203 lb 6.4 oz (92.3 kg)   06/21/21 214 lb (97.1 kg)     BP stable. No ACE at this time. Microalbuminuria NEG. BP Readings from Last 3 Encounters:   10/20/21 134/72   07/20/21 (!) 152/90   06/21/21 138/86       Labs reviewed. On Metformin 8736 mg BID and Trulicity 1.5 mg     Lab Results   Component Value Date    LABA1C 6.0 10/20/2021    LABA1C 8.0 (H) 06/12/2021    LABA1C 6.3 02/10/2020     Lab Results   Component Value Date     11/08/2019       On Lipitor 20 mg. Tolerating well. LIPIDS well controlled.   Hysterectomy    No components found for: CHLPL  Lab Results   Component Value Date    TRIG 111 06/12/2021    TRIG 125 11/08/2019    TRIG 59 11/05/2018     Lab Results   Component Value Date    HDL 42 06/12/2021    HDL 43 11/08/2019    HDL 43 11/05/2018     Lab Results   Component Value Date    LDLCALC 83 06/12/2021    LDLCALC 63 11/05/2018    LDLCALC 89 01/22/2018     No results found for: LABVLDL      Chemistry        Component Value Date/Time     06/12/2021 1016    K 4.1 06/12/2021 1016     06/12/2021 1016    CO2 24 06/12/2021 1016    BUN 9 06/12/2021 1016    CREATININE 0.6 06/12/2021 1016        Component Value Date/Time    CALCIUM 9.5 06/12/2021 1016    ALKPHOS 86 06/12/2021 1016    AST 29 06/12/2021 1016    ALT 43 06/12/2021 1016    BILITOT 0.5 06/12/2021 1016            Lab Results   Component Value Date    TSH 0.782 06/12/2021       Lab Results   Component Value Date    WBC 10.3 06/12/2021    HGB 15.8 06/12/2021    HCT 49.0 (H) 06/12/2021    MCV 87.0 06/12/2021     06/12/2021         Health Maintenance   Topic Date Due    Hepatitis C screen  Never done    Varicella vaccine (1 of 2 - 2-dose childhood series) Never done    Pneumococcal 0-64 years Vaccine (1 of 2 - PPSV23) Never done    COVID-19 Vaccine (1) Never done    Hepatitis B vaccine (1 of 3 - Risk 3-dose series) Never done    DTaP/Tdap/Td vaccine (1 - Tdap) Never done    Diabetic retinal exam  06/03/2021    Flu vaccine (1) 09/01/2021    Diabetic microalbuminuria test  06/12/2022    Lipid screen  06/12/2022    Diabetic foot exam  07/20/2022    A1C test (Diabetic or Prediabetic)  10/20/2022    HIV screen  Addressed    Hepatitis A vaccine  Aged Out    Hib vaccine  Aged Out    Meningococcal (ACWY) vaccine  Aged [employer] History   Administered Date(s) Administered    Influenza Vaccine, unspecified formulation 09/29/2016    Influenza Virus Vaccine 09/26/2017, 09/25/2019       Review of Systems   Constitutional: Negative for chills and fever. HENT: Negative. Respiratory: Negative for cough. Gastrointestinal: Negative for abdominal pain and nausea. Skin: Negative for rash. Neurological: Negative for light-headedness. Psychiatric/Behavioral: Positive for dysphoric mood. Objective:   Physical Exam  Constitutional:       General: She is not in acute distress. Appearance: She is well-developed.    HENT:      Right Ear: Tympanic membrane normal.      Left Ear: Tympanic membrane normal.      Nose: Nose normal.   Cardiovascular:      Rate and Rhythm: Normal rate and regular rhythm. Pulses: Normal pulses. Heart sounds: Normal heart sounds, S1 normal and S2 normal. No murmur heard. No S3 sounds. Pulmonary:      Effort: Pulmonary effort is normal.      Breath sounds: Normal breath sounds. No decreased breath sounds, wheezing or rhonchi. Abdominal:      General: Bowel sounds are normal.      Palpations: Abdomen is soft. Tenderness: There is no abdominal tenderness. Neurological:      Mental Status: She is alert and oriented to person, place, and time. Psychiatric:         Behavior: Behavior normal.         Assessment:       Diagnosis Orders   1. Type 2 diabetes mellitus without complication, without long-term current use of insulin (HCC)  POCT glycosylated hemoglobin (Hb A1C)    Hemoglobin A1C   2. Mixed hyperlipidemia     3. Metabolic syndrome     4. Class 2 severe obesity with serious comorbidity and body mass index (BMI) of 35.0 to 35.9 in adult, unspecified obesity type ([facility] [address]     5. GREER (generalized anxiety disorder)             Plan:       Diet and exercise continued  Labs reviewed - A1C 6.0!!! Continue current treatment  Chronic conditions stable  Repeat Labs in 4 months  RTO in 4 months for Adipex       Humaira Carbajal received counseling on the following healthy behaviors: nutrition and exercise  Reviewed prior labs and health maintenance  Continue current medications, diet and exercise. Discussed use, benefit, and side effects of prescribed medications. Barriers to medication compliance addressed. Patient given educational materials - see patient instructions  Was a self-tracking handout given in paper form or via [employer]? No:     Requested Prescriptions      No prescriptions requested or ordered in this encounter       All patient questions answered. Patient voiced understanding. Quality Measures    Body mass index is 33.31 kg/m². Elevated. Weight control planned discussed Healthy diet and regular exercise.     BP: 134/72 Blood pressure is normal. Treatment plan consists of No treatment change needed.     Lab Results   Component Value Date    LDLCALC 83 06/12/2021    LDLDIRECT 99 11/08/2019    (goal LDL reduction with dx if diabetes is 50% LDL reduction)      PHQ Scores 5/20/2021 1/7/2020 2/6/2019 5/29/2018 3/27/2017   PHQ2 Score 3 0 0 0 0   PHQ9 Score 13 0 0 0 0     Interpretation of Total Score Depression Severity: 1-4 = Minimal depression, 5-9 = Mild depression, 10-14 = Moderate depression, 15-19 = Moderately severe depression, 20-27 = Severe depression

## 2021-12-02 DIAGNOSIS — E11.9 TYPE 2 DIABETES MELLITUS WITHOUT COMPLICATION, WITHOUT LONG-TERM CURRENT USE OF INSULIN (HCC): ICD-10-CM

## 2021-12-03 RX ORDER — DULAGLUTIDE 1.5 MG/.5ML
1.5 INJECTION, SOLUTION SUBCUTANEOUS WEEKLY
Qty: 4 PEN | Refills: 2 | Status: SHIPPED | OUTPATIENT
Start: 2021-12-03 | End: 2022-02-16 | Stop reason: SDUPTHER

## 2021-12-30 ENCOUNTER — HOSPITAL ENCOUNTER (EMERGENCY)
Age: 36
Discharge: HOME | End: 2021-12-30
Payer: COMMERCIAL

## 2021-12-30 VITALS
DIASTOLIC BLOOD PRESSURE: 91 MMHG | HEART RATE: 114 BPM | OXYGEN SATURATION: 96 % | RESPIRATION RATE: 18 BRPM | TEMPERATURE: 99.2 F | WEIGHT: 210 LBS | SYSTOLIC BLOOD PRESSURE: 143 MMHG | BODY MASS INDEX: 33.89 KG/M2

## 2021-12-30 DIAGNOSIS — J06.9 VIRAL URI: Primary | ICD-10-CM

## 2021-12-30 LAB
INFLUENZA A: NOT DETECTED
INFLUENZA B: NOT DETECTED
SARS-COV-2 RNA, RT PCR: DETECTED

## 2021-12-30 PROCEDURE — 99213 OFFICE O/P EST LOW 20 MIN: CPT | Performed by: NURSE PRACTITIONER

## 2021-12-30 PROCEDURE — 99213 OFFICE O/P EST LOW 20 MIN: CPT

## 2021-12-30 PROCEDURE — 87636 SARSCOV2 & INF A&B AMP PRB: CPT

## 2021-12-30 RX ORDER — OSELTAMIVIR PHOSPHATE 75 MG/1
75 CAPSULE ORAL 2 TIMES DAILY
Qty: 10 CAPSULE | Refills: 0 | Status: SHIPPED | OUTPATIENT
Start: 2021-12-30 | End: 2022-01-04

## 2021-12-30 ASSESSMENT — PAIN SCALES - GENERAL: PAINLEVEL_OUTOF10: 9

## 2021-12-30 ASSESSMENT — ENCOUNTER SYMPTOMS
SHORTNESS OF BREATH: 0
COUGH: 0
NAUSEA: 0
VOMITING: 0
RHINORRHEA: 1
EYE DISCHARGE: 0
TROUBLE SWALLOWING: 0
SINUS PRESSURE: 1
SINUS PAIN: 1
EYE REDNESS: 0
DIARRHEA: 0
SORE THROAT: 0

## 2021-12-30 ASSESSMENT — PAIN DESCRIPTION - DESCRIPTORS: DESCRIPTORS: ACHING

## 2021-12-30 ASSESSMENT — PAIN DESCRIPTION - LOCATION: LOCATION: HEAD

## 2021-12-30 ASSESSMENT — PAIN DESCRIPTION - PAIN TYPE: TYPE: ACUTE PAIN

## 2021-12-30 NOTE — ED TRIAGE NOTES
Pt walked to room 1. Pt here with complaints of a headache, body aches, fever, head congestion. Started yesterday.

## 2021-12-30 NOTE — Clinical Note
Kenan Pierre was seen and treated in our emergency department on 12/30/2021. She may return to work on 01/01/2022. She may return with a negative result. If you have any questions or concerns, please don't hesitate to call.       Ortega Palacios, SUNNY - CNP

## 2021-12-30 NOTE — ED PROVIDER NOTES
[address]       Chief Complaint   Patient presents with    Headache     Headache, fever, body aches and head congestion. Started yesterday. Nurses Notes reviewed and I agree except as noted in the HPI. HISTORY OF PRESENT ILLNESS   Tosin Higgins is a 39 y.o. female who presents for evaluation of URI symptoms. Onset yesterday, unchanged. Patient complains of sinus congestion/headache, fever, body aches. Fever is intermittent, subjective. Currently 99.2. No travel. No known exposure to similar symptoms. Patient works at a [employer]. She has not vaccinated against the flu or COVID-19. No treatment prior to arrival.    REVIEW OF SYSTEMS     Review of Systems   Constitutional: Positive for fatigue and fever. Negative for chills and diaphoresis. HENT: Positive for congestion, postnasal drip, rhinorrhea, sinus pressure and sinus pain. Negative for ear pain, sore throat and trouble swallowing. Eyes: Negative for discharge and redness. Respiratory: Negative for cough and shortness of breath. Cardiovascular: Negative for chest pain. Gastrointestinal: Negative for diarrhea, nausea and vomiting. Genitourinary: Negative for decreased urine volume. Musculoskeletal: Positive for myalgias. Negative for neck pain and neck stiffness. Skin: Negative for rash. Neurological: Positive for headaches. Negative for dizziness. Hematological: Negative for adenopathy. Psychiatric/Behavioral: Negative for sleep disturbance. PAST MEDICAL HISTORY         Diagnosis Date    Asthma     Depression     Diabetes mellitus ([facility] [address]     Hemorrhoids     Hyperlipidemia     Hypertension        SURGICAL HISTORY     Patient  has a past surgical history that includes Tonsillectomy; Tympanostomy tube placement;  section (); Colonoscopy (); Hysterectomy (2017); Tympanostomy tube placement ();  Colonoscopy (Left, 11/19/2019); and Hemorrhoid surgery (N/A, 11/19/2019). CURRENT MEDICATIONS       Previous Medications    ATORVASTATIN (LIPITOR) 20 MG TABLET    Take 1 tablet by mouth daily    DULAGLUTIDE (TRULICITY) 1.5 QUINTANA/3.5SR SOPN    Inject 1.5 mg into the skin once a week    METFORMIN (GLUCOPHAGE) 1000 MG TABLET    Take 1 tablet by mouth 2 times daily (with meals)    VENLAFAXINE (EFFEXOR XR) 150 MG EXTENDED RELEASE CAPSULE    Take 1 capsule by mouth daily       ALLERGIES     Patient is is allergic to latex. FAMILY HISTORY     Patient'sfamily history includes Dementia in her paternal grandfather; Diabetes in her maternal grandfather; Esophageal Cancer in her paternal grandmother; Heart Disease in her maternal grandfather and maternal grandmother; High Blood Pressure in her mother; No Known Problems in her brother, brother, and sister; Other in her father. SOCIAL HISTORY     Patient  reports that she has been smoking. She has been smoking about 0.10 packs per day. She has never used smokeless tobacco. She reports current alcohol use. She reports that she does not use drugs. PHYSICAL EXAM     ED TRIAGE VITALS  BP: (!) 143/91, Temp: 99.2 °F (37.3 °C), Pulse: 114, Resp: 18, SpO2: 96 %  Physical Exam  Vitals and nursing note reviewed. Constitutional:       General: She is not in acute distress. Appearance: Normal appearance. She is well-developed. She is not ill-appearing, toxic-appearing or diaphoretic. HENT:      Head: Normocephalic and atraumatic. Right Ear: Hearing, tympanic membrane, ear canal and external ear normal. No mastoid tenderness. No hemotympanum. Tympanic membrane is not perforated, erythematous or bulging. Left Ear: Hearing, tympanic membrane, ear canal and external ear normal. No mastoid tenderness. No hemotympanum. Tympanic membrane is not perforated, erythematous or bulging. Nose: Congestion present. No rhinorrhea.       Mouth/Throat:      Mouth: Mucous membranes are moist. Pharynx: Oropharynx is clear. Uvula midline. Tonsils: No tonsillar abscesses. Eyes:      General: No scleral icterus. Conjunctiva/sclera: Conjunctivae normal.      Right eye: Right conjunctiva is not injected. No hemorrhage. Left eye: Left conjunctiva is not injected. No hemorrhage. Neck:      Thyroid: No thyromegaly. Trachea: Trachea normal.   Cardiovascular:      Rate and Rhythm: Normal rate and regular rhythm. No extrasystoles are present. Chest Wall: PMI is not displaced. Heart sounds: Normal heart sounds. No murmur heard. No friction rub. No gallop. Pulmonary:      Effort: Pulmonary effort is normal. No respiratory distress. Breath sounds: Normal breath sounds. Chest:   Breasts:      Right: No supraclavicular adenopathy. Left: No supraclavicular adenopathy. Musculoskeletal:      Cervical back: Normal range of motion and neck supple. Lymphadenopathy:      Head:      Right side of head: No submental, submandibular, tonsillar or occipital adenopathy. Left side of head: No submental, submandibular, tonsillar or occipital adenopathy. Cervical: No cervical adenopathy. Upper Body:      Right upper body: No supraclavicular adenopathy. Left upper body: No supraclavicular adenopathy. Skin:     General: Skin is warm and dry. Capillary Refill: Capillary refill takes less than 2 seconds. Coloration: Skin is not pale. Findings: No rash. Comments: Skin warm and dry to touch, no rashes noted on exposed surfaces. Neurological:      Mental Status: She is alert and oriented to person, place, and time. She is not disoriented. Psychiatric:         Mood and Affect: Mood normal.         Behavior: Behavior is cooperative. DIAGNOSTIC RESULTS   Labs: No results found for this visit on 12/30/21.     IMAGING:  No orders to display     URGENT CARE COURSE:     Vitals:    12/30/21 1329   BP: (!) 143/91   Pulse: 114   Resp: 18   Temp: 99.2 °F (37.3 °C)   TempSrc: Temporal   SpO2: 96%   Weight: 210 lb (95.3 kg)       Medications - No data to display  PROCEDURES:  None  FINALIMPRESSION      1. Viral URI        DISPOSITION/PLAN   DISPOSITION Decision To Discharge 12/30/2021 01:40:37 PM  Nontoxic, no distress. Pending Covid/influenza. If influenza positive, okay to begin Tamiflu twice daily for 5 days. Day/NyQuil over-the-counter. Increase fluids, rest. If any distress go to ER. PATIENT REFERRED TO:  Lyubov Reardon, APRN - CNP  [facility], [address] [place] [address] [address]  [address] [facility]  [phone]      Follow up with PCP as needed. Day/Nyquil OTC. Increase fluids. If flu positie ok to start Tamiflu. If worse go to ER.     DISCHARGE MEDICATIONS:  New Prescriptions    OSELTAMIVIR (TAMIFLU) 75 MG CAPSULE    Take 1 capsule by mouth 2 times daily for 5 days     Current Discharge Medication List          Adama Carter, [address] [facility], APRN - CNP  12/30/21 9712

## 2022-01-03 ENCOUNTER — CARE COORDINATION (OUTPATIENT)
Dept: CARE COORDINATION | Age: 37
End: 2022-01-03

## 2022-01-03 NOTE — ACP (ADVANCE CARE PLANNING)
Advance Care Planning   Healthcare Decision Maker:    Primary Decision Maker: Jovon Garcia - [phone]    Today we documented Decision Maker(s) consistent with Legal Next of Kin hierarchy.      Healthcare Decision Maker information reviewed and updated per patient's request.

## 2022-01-03 NOTE — CARE COORDINATION
Patient contacted regarding COVID-19 diagnosis. Discussed COVID-19 related testing which was available at this time. Test results were positive. Patient informed of results, if available? Positive results reviewed with patient and she stated she was aware of results prior to this call being completed. Ambulatory Care Manager contacted the patient by telephone to perform post discharge assessment. Call within 2 business days of discharge: Yes. Verified name and  with patient as identifiers. Provided introduction to self, and explanation of the CTN/ACM role, and reason for call due to risk factors for infection and/or exposure to COVID-19. Symptoms reviewed with patient who verbalized the following symptoms: fever, pain or aching joints, cough, shortness of breath, no new symptoms and no worsening symptoms. Due to no new or worsening symptoms encounter was not routed to provider for escalation. Discussed follow-up appointments. If no appointment was previously scheduled, appointment scheduling offered: Patient shared she will call and schedule PCP f/u today and she declined assistance with this process. [facility] follow up appointment(s):   Future Appointments   Date Time Provider Aparna Dowd   2022  1:30 PM SUNNY Morales     [facility] follow up appointment(s): N/A     Non-face-to-face services provided:  Obtained and reviewed discharge summary and/or continuity of care documents  Education of patient/family/caregiver/guardian to support self-management-COVID-19 education and zone information reviewed with patient. Patient educated on signs/symptoms to report as well as the importance of early symptom recognition. Patient verbalized understanding. ACM reviewed need to self quarantine as directed and encouraged patient to f/u with the health department. Advance Care Planning:   Does patient have an Advance Directive:  reviewed and current.   ACP note completed. Educated patient about risk for severe COVID-19 due to risk factors according to CDC guidelines. ACM reviewed discharge instructions, medical action plan and red flag symptoms with the patient who verbalized understanding. Discussed COVID vaccination status: No.  Discussed exposure protocols and quarantine with CDC Guidelines. Patient was given an opportunity to verbalize any questions and concerns and agrees to contact ACM or health care provider for questions related to their healthcare. Reviewed and educated patient on any new and changed medications related to discharge diagnosis     Was patient discharged with a pulse oximeter? No     ACM provided contact information. No further follow-up call identified per patient's request.      Patient called for covid-19 f/u s/p recent  visit for c/o headache, fever, body aches, and congestion. Patient denied any new/change/worsening of symptoms. Patient shared she is feeling \"a little better\" and she denied any questions re: her discharge instructions. Patient shared she will call and schedule PCP f/u on her own and she denied any need for assistance. COVID-19 education and zone information was reviewed with patient. Patient was educated on signs/symptoms to report as well as the importance of early symptom recognition. Patient verbalized understanding. ACM reviewed need to self quarantine as directed and patient acknowledged understanding. Patient was reminded to call covid-19 hotline number with any new symptoms or questions/concerns. Patient verbalized understanding. Patient denied any other questions, concerns, or needs. Patient declines any further ACM follow up and she was instructed to f/u with PCP's office with any ongoing needs.

## 2022-02-15 LAB
ESTIMATED AVERAGE GLUCOSE: 143 MG/DL
HBA1C MFR BLD: 6.6 % (ref 4.4–6.4)

## 2022-02-16 ENCOUNTER — OFFICE VISIT (OUTPATIENT)
Dept: FAMILY MEDICINE CLINIC | Age: 37
End: 2022-02-16
Payer: COMMERCIAL

## 2022-02-16 VITALS
HEART RATE: 104 BPM | RESPIRATION RATE: 20 BRPM | WEIGHT: 214.8 LBS | SYSTOLIC BLOOD PRESSURE: 136 MMHG | HEIGHT: 66 IN | BODY MASS INDEX: 34.52 KG/M2 | DIASTOLIC BLOOD PRESSURE: 66 MMHG

## 2022-02-16 DIAGNOSIS — E66.9 OBESITY, CLASS I, BMI 30-34.9: Primary | ICD-10-CM

## 2022-02-16 DIAGNOSIS — F41.1 GAD (GENERALIZED ANXIETY DISORDER): ICD-10-CM

## 2022-02-16 DIAGNOSIS — E11.9 TYPE 2 DIABETES MELLITUS WITHOUT COMPLICATION, WITHOUT LONG-TERM CURRENT USE OF INSULIN (HCC): ICD-10-CM

## 2022-02-16 DIAGNOSIS — E78.2 MIXED HYPERLIPIDEMIA: ICD-10-CM

## 2022-02-16 DIAGNOSIS — E88.81 METABOLIC SYNDROME: ICD-10-CM

## 2022-02-16 PROCEDURE — 3044F HG A1C LEVEL LT 7.0%: CPT | Performed by: NURSE PRACTITIONER

## 2022-02-16 PROCEDURE — G8427 DOCREV CUR MEDS BY ELIG CLIN: HCPCS | Performed by: NURSE PRACTITIONER

## 2022-02-16 PROCEDURE — 2022F DILAT RTA XM EVC RTNOPTHY: CPT | Performed by: NURSE PRACTITIONER

## 2022-02-16 PROCEDURE — 99214 OFFICE O/P EST MOD 30 MIN: CPT | Performed by: NURSE PRACTITIONER

## 2022-02-16 PROCEDURE — 4004F PT TOBACCO SCREEN RCVD TLK: CPT | Performed by: NURSE PRACTITIONER

## 2022-02-16 PROCEDURE — G8417 CALC BMI ABV UP PARAM F/U: HCPCS | Performed by: NURSE PRACTITIONER

## 2022-02-16 PROCEDURE — G8484 FLU IMMUNIZE NO ADMIN: HCPCS | Performed by: NURSE PRACTITIONER

## 2022-02-16 RX ORDER — DULAGLUTIDE 1.5 MG/.5ML
1.5 INJECTION, SOLUTION SUBCUTANEOUS WEEKLY
Qty: 12 PEN | Refills: 3 | Status: SHIPPED | OUTPATIENT
Start: 2022-02-16

## 2022-02-16 RX ORDER — PHENTERMINE HYDROCHLORIDE 37.5 MG/1
37.5 CAPSULE ORAL EVERY MORNING
Qty: 30 CAPSULE | Refills: 0 | Status: SHIPPED | OUTPATIENT
Start: 2022-02-16 | End: 2022-03-16 | Stop reason: SDUPTHER

## 2022-02-16 RX ORDER — ATORVASTATIN CALCIUM 20 MG/1
20 TABLET, FILM COATED ORAL DAILY
Qty: 90 TABLET | Refills: 3 | Status: SHIPPED | OUTPATIENT
Start: 2022-02-16

## 2022-02-16 SDOH — ECONOMIC STABILITY: FOOD INSECURITY: WITHIN THE PAST 12 MONTHS, THE FOOD YOU BOUGHT JUST DIDN'T LAST AND YOU DIDN'T HAVE MONEY TO GET MORE.: NEVER TRUE

## 2022-02-16 SDOH — ECONOMIC STABILITY: FOOD INSECURITY: WITHIN THE PAST 12 MONTHS, YOU WORRIED THAT YOUR FOOD WOULD RUN OUT BEFORE YOU GOT MONEY TO BUY MORE.: NEVER TRUE

## 2022-02-16 ASSESSMENT — ENCOUNTER SYMPTOMS
NAUSEA: 0
COUGH: 0
ABDOMINAL PAIN: 0

## 2022-02-16 ASSESSMENT — SOCIAL DETERMINANTS OF HEALTH (SDOH): HOW HARD IS IT FOR YOU TO PAY FOR THE VERY BASICS LIKE FOOD, HOUSING, MEDICAL CARE, AND HEATING?: NOT HARD AT ALL

## 2022-02-16 NOTE — PROGRESS NOTES
1016    K 4.1 06/12/2021 1016     06/12/2021 1016    CO2 24 06/12/2021 1016    BUN 9 06/12/2021 1016    CREATININE 0.6 06/12/2021 1016        Component Value Date/Time    CALCIUM 9.5 06/12/2021 1016    ALKPHOS 86 06/12/2021 1016    AST 29 06/12/2021 1016    ALT 43 06/12/2021 1016    BILITOT 0.5 06/12/2021 1016            Lab Results   Component Value Date    TSH 0.782 06/12/2021       Lab Results   Component Value Date    WBC 10.3 06/12/2021    HGB 15.8 06/12/2021    HCT 49.0 (H) 06/12/2021    MCV 87.0 06/12/2021     06/12/2021         Health Maintenance   Topic Date Due    Hepatitis C screen  Never done    Varicella vaccine (1 of 2 - 2-dose childhood series) Never done    COVID-19 Vaccine (1) Never done    Pneumococcal 0-64 years Vaccine (1 of 2 - PPSV23) Never done    Hepatitis B vaccine (1 of 3 - Risk 3-dose series) Never done    DTaP/Tdap/Td vaccine (1 - Tdap) Never done    Diabetic retinal exam  06/03/2021    Flu vaccine (1) 09/01/2021    Depression Monitoring  05/20/2022    Diabetic microalbuminuria test  06/12/2022    Lipid screen  06/12/2022    Diabetic foot exam  07/20/2022    A1C test (Diabetic or Prediabetic)  02/14/2023    HIV screen  Addressed    Hepatitis A vaccine  Aged Out    Hib vaccine  Aged Out    Meningococcal (ACWY) vaccine  Aged [employer] History   Administered Date(s) Administered    Influenza Vaccine, unspecified formulation 09/29/2016    Influenza Virus Vaccine 09/26/2017, 09/25/2019       Review of Systems   Constitutional: Negative for chills and fever. HENT: Negative. Respiratory: Negative for cough. Gastrointestinal: Negative for abdominal pain and nausea. Skin: Negative for rash. Neurological: Negative for light-headedness. Psychiatric/Behavioral: Positive for dysphoric mood. Objective:   Physical Exam  Constitutional:       General: She is not in acute distress. Appearance: She is well-developed.    HENT:      Right Ear: Tympanic membrane normal.      Left Ear: Tympanic membrane normal.      Nose: Nose normal.   Cardiovascular:      Rate and Rhythm: Normal rate and regular rhythm. Pulses: Normal pulses. Heart sounds: Normal heart sounds, S1 normal and S2 normal. No murmur heard. No S3 sounds. Pulmonary:      Effort: Pulmonary effort is normal.      Breath sounds: Normal breath sounds. No decreased breath sounds, wheezing or rhonchi. Abdominal:      General: Bowel sounds are normal.      Palpations: Abdomen is soft. Tenderness: There is no abdominal tenderness. Neurological:      Mental Status: She is alert and oriented to person, place, and time. Psychiatric:         Behavior: Behavior normal.         Assessment:       Diagnosis Orders   1. Obesity, Class I, BMI 30-34. 9  phentermine (ADIPEX-P) 37.5 MG capsule   2. Mixed hyperlipidemia  atorvastatin (LIPITOR) 20 MG tablet   3. Type 2 diabetes mellitus without complication, without long-term current use of insulin (HCC)  metFORMIN (GLUCOPHAGE) 1000 MG tablet    Dulaglutide (TRULICITY) 1.5 BP/4.8NU SOPN   4. Metabolic syndrome     5. GREER (generalized anxiety disorder)             Plan:       Diet and exercise continued  Adipex #1  Labs reviewed - A1C 6.6!!! Continue current treatment  Chronic conditions stable  RTO in 1 months for Adipex       Raul Mcmahon received counseling on the following healthy behaviors: nutrition and exercise  Reviewed prior labs and health maintenance  Continue current medications, diet and exercise. Discussed use, benefit, and side effects of prescribed medications. Barriers to medication compliance addressed. Patient given educational materials - see patient instructions  Was a self-tracking handout given in paper form or via [employer]? No:     Requested Prescriptions     Signed Prescriptions Disp Refills    phentermine (ADIPEX-P) 37.5 MG capsule 30 capsule 0     Sig: Take 1 capsule by mouth every morning for 30 days.     atorvastatin (LIPITOR) 20 MG tablet 90 tablet 3     Sig: Take 1 tablet by mouth daily    metFORMIN (GLUCOPHAGE) 1000 MG tablet 180 tablet 3     Sig: Take 1 tablet by mouth 2 times daily (with meals)    Dulaglutide (TRULICITY) 1.5 XT/9.4JH SOPN 12 pen 3     Sig: Inject 1.5 mg into the skin once a week       All patient questions answered. Patient voiced understanding. Quality Measures    Body mass index is 34.67 kg/m². Elevated. Weight control planned discussed Healthy diet and regular exercise. BP: 136/66 Blood pressure is normal. Treatment plan consists of No treatment change needed.     Lab Results   Component Value Date    LDLCALC 83 06/12/2021    LDLDIRECT 99 11/08/2019    (goal LDL reduction with dx if diabetes is 50% LDL reduction)      PHQ Scores 5/20/2021 1/7/2020 2/6/2019 5/29/2018 3/27/2017   PHQ2 Score 3 0 0 0 0   PHQ9 Score 13 0 0 0 0     Interpretation of Total Score Depression Severity: 1-4 = Minimal depression, 5-9 = Mild depression, 10-14 = Moderate depression, 15-19 = Moderately severe depression, 20-27 = Severe depression

## 2022-03-16 ENCOUNTER — OFFICE VISIT (OUTPATIENT)
Dept: FAMILY MEDICINE CLINIC | Age: 37
End: 2022-03-16
Payer: COMMERCIAL

## 2022-03-16 VITALS
RESPIRATION RATE: 20 BRPM | DIASTOLIC BLOOD PRESSURE: 94 MMHG | BODY MASS INDEX: 33.65 KG/M2 | SYSTOLIC BLOOD PRESSURE: 150 MMHG | HEART RATE: 88 BPM | WEIGHT: 208.5 LBS

## 2022-03-16 DIAGNOSIS — E66.9 OBESITY, CLASS I, BMI 30-34.9: Primary | ICD-10-CM

## 2022-03-16 DIAGNOSIS — E78.2 MIXED HYPERLIPIDEMIA: ICD-10-CM

## 2022-03-16 DIAGNOSIS — E11.9 TYPE 2 DIABETES MELLITUS WITHOUT COMPLICATION, WITHOUT LONG-TERM CURRENT USE OF INSULIN (HCC): ICD-10-CM

## 2022-03-16 DIAGNOSIS — I10 ESSENTIAL HYPERTENSION: ICD-10-CM

## 2022-03-16 PROCEDURE — G8417 CALC BMI ABV UP PARAM F/U: HCPCS | Performed by: NURSE PRACTITIONER

## 2022-03-16 PROCEDURE — G8427 DOCREV CUR MEDS BY ELIG CLIN: HCPCS | Performed by: NURSE PRACTITIONER

## 2022-03-16 PROCEDURE — 3044F HG A1C LEVEL LT 7.0%: CPT | Performed by: NURSE PRACTITIONER

## 2022-03-16 PROCEDURE — 2022F DILAT RTA XM EVC RTNOPTHY: CPT | Performed by: NURSE PRACTITIONER

## 2022-03-16 PROCEDURE — G8484 FLU IMMUNIZE NO ADMIN: HCPCS | Performed by: NURSE PRACTITIONER

## 2022-03-16 PROCEDURE — 99214 OFFICE O/P EST MOD 30 MIN: CPT | Performed by: NURSE PRACTITIONER

## 2022-03-16 PROCEDURE — 4004F PT TOBACCO SCREEN RCVD TLK: CPT | Performed by: NURSE PRACTITIONER

## 2022-03-16 RX ORDER — LISINOPRIL 10 MG/1
10 TABLET ORAL DAILY
Qty: 90 TABLET | Refills: 3 | Status: SHIPPED | OUTPATIENT
Start: 2022-03-16 | End: 2022-08-05

## 2022-03-16 RX ORDER — PHENTERMINE HYDROCHLORIDE 37.5 MG/1
37.5 CAPSULE ORAL EVERY MORNING
Qty: 30 CAPSULE | Refills: 0 | Status: SHIPPED | OUTPATIENT
Start: 2022-03-16 | End: 2022-04-14 | Stop reason: SDUPTHER

## 2022-03-16 ASSESSMENT — ENCOUNTER SYMPTOMS
NAUSEA: 0
COUGH: 0
ABDOMINAL PAIN: 0

## 2022-03-16 NOTE — PROGRESS NOTES
Subjective:      Patient ID: Gena Savage is a 40 y.o. female. HPI: 1 month Follow Up    Chief Complaint   Patient presents with    1 Month Follow-Up       Maintained majority of weight loss from Adipex 6 months ago. Was restarted back on Adipex #1. 6# lost.   Has loss inches in which she has measured. Tolerated well. Has been consistent in going to the gym. Wt Readings from Last 3 Encounters:   03/16/22 208 lb 8 oz (94.6 kg)   02/16/22 214 lb 12.8 oz (97.4 kg)   12/30/21 210 lb (95.3 kg)     Body mass index is 33.65 kg/m². Vitals:    03/16/22 0834 03/16/22 0837   BP: (!) 148/98 (!) 150/94   Site: Left Upper Arm Left Upper Arm   Position: Sitting Sitting   Cuff Size: Large Adult Large Adult   Pulse: 88    Resp: 20    Weight: 208 lb 8 oz (94.6 kg)        Patient Active Problem List   Diagnosis    Asthma with bronchitis    Diabetes mellitus, type 2 ([facility] [address]    Hyperlipidemia    Metabolic syndrome    Obesity, Class I, BMI 30-34.9    Essential hypertension       ENT - Dr. Eric Lady - Dr. López Sensing    Denies CP, SOB or chest tightness    Does feel better since working out more, eating better - physically and mentally. On Effexor 150 mg XL. Wt Readings from Last 3 Encounters:   03/16/22 208 lb 8 oz (94.6 kg)   02/16/22 214 lb 12.8 oz (97.4 kg)   12/30/21 210 lb (95.3 kg)       BP stable. No ACE at this time. Microalbuminuria NEG. BP Readings from Last 3 Encounters:   03/16/22 (!) 150/94   02/16/22 136/66   12/30/21 (!) 143/91       Labs reviewed. On Metformin 9109 mg BID and Trulicity 1.5 mg     Lab Results   Component Value Date    LABA1C 6.6 (H) 02/14/2022    LABA1C 6.0 10/20/2021    LABA1C 8.0 (H) 06/12/2021     Lab Results   Component Value Date     02/14/2022       On Lipitor 20 mg. Tolerating well. LIPIDS well controlled.   Hysterectomy    No components found for: CHLPL  Lab Results   Component Value Date    TRIG 111 06/12/2021    TRIG 125 11/08/2019    TRIG 59 11/05/2018     Lab Results   Component Value Date    HDL 42 06/12/2021    HDL 43 11/08/2019    HDL 43 11/05/2018     Lab Results   Component Value Date    LDLCALC 83 06/12/2021    LDLCALC 63 11/05/2018    LDLCALC 89 01/22/2018     No results found for: LABVLDL      Chemistry        Component Value Date/Time     06/12/2021 1016    K 4.1 06/12/2021 1016     06/12/2021 1016    CO2 24 06/12/2021 1016    BUN 9 06/12/2021 1016    CREATININE 0.6 06/12/2021 1016        Component Value Date/Time    CALCIUM 9.5 06/12/2021 1016    ALKPHOS 86 06/12/2021 1016    AST 29 06/12/2021 1016    ALT 43 06/12/2021 1016    BILITOT 0.5 06/12/2021 1016            Lab Results   Component Value Date    TSH 0.782 06/12/2021       Lab Results   Component Value Date    WBC 10.3 06/12/2021    HGB 15.8 06/12/2021    HCT 49.0 (H) 06/12/2021    MCV 87.0 06/12/2021     06/12/2021         Health Maintenance   Topic Date Due    Hepatitis C screen  Never done    Varicella vaccine (1 of 2 - 2-dose childhood series) Never done    COVID-19 Vaccine (1) Never done    Pneumococcal 0-64 years Vaccine (1 of 2 - PPSV23) Never done    Hepatitis B vaccine (1 of 3 - Risk 3-dose series) Never done    DTaP/Tdap/Td vaccine (1 - Tdap) Never done    Diabetic retinal exam  06/03/2021    Flu vaccine (1) 09/01/2021    Depression Monitoring  05/20/2022    Diabetic microalbuminuria test  06/12/2022    Lipid screen  06/12/2022    Potassium monitoring  06/12/2022    Creatinine monitoring  06/12/2022    Diabetic foot exam  07/20/2022    A1C test (Diabetic or Prediabetic)  02/14/2023    HIV screen  Addressed    Hepatitis A vaccine  Aged Out    Hib vaccine  Aged Out    Meningococcal (ACWY) vaccine  Aged Out       Immunization History   Administered Date(s) Administered    Influenza Vaccine, unspecified formulation 09/29/2016    Influenza Virus Vaccine 09/26/2017, 09/25/2019       Review of Systems   Constitutional: Negative for chills and fever.   HENT: Negative. Respiratory: Negative for cough. Gastrointestinal: Negative for abdominal pain and nausea. Skin: Negative for rash. Neurological: Negative for light-headedness. Psychiatric/Behavioral: Positive for dysphoric mood. Objective:   Physical Exam  Constitutional:       General: She is not in acute distress. Appearance: She is well-developed. HENT:      Right Ear: Tympanic membrane normal.      Left Ear: Tympanic membrane normal.      Nose: Nose normal.   Cardiovascular:      Rate and Rhythm: Normal rate and regular rhythm. Pulses: Normal pulses. Heart sounds: Normal heart sounds, S1 normal and S2 normal. No murmur heard. No S3 sounds. Pulmonary:      Effort: Pulmonary effort is normal.      Breath sounds: Normal breath sounds. No decreased breath sounds, wheezing or rhonchi. Abdominal:      General: Bowel sounds are normal.      Palpations: Abdomen is soft. Tenderness: There is no abdominal tenderness. Neurological:      Mental Status: She is alert and oriented to person, place, and time. Psychiatric:         Behavior: Behavior normal.         Assessment:       Diagnosis Orders   1. Obesity, Class I, BMI 30-34. 9  phentermine (ADIPEX-P) 37.5 MG capsule   2. Type 2 diabetes mellitus without complication, without long-term current use of insulin (HCC)  lisinopril (PRINIVIL;ZESTRIL) 10 MG tablet   3. Mixed hyperlipidemia     4. Essential hypertension             Plan:       6# lost  Diet and exercise continued  Adipex #2  Add on Lisinopril 10 mg for BP and DM management  Chronic conditions stable  RTO in 1 months for Adipex       Frank Belts received counseling on the following healthy behaviors: nutrition and exercise  Reviewed prior labs and health maintenance  Continue current medications, diet and exercise. Discussed use, benefit, and side effects of prescribed medications. Barriers to medication compliance addressed.    Patient given educational materials - see patient instructions  Was a self-tracking handout given in paper form or via [employer]? No:     Requested Prescriptions     Signed Prescriptions Disp Refills    phentermine (ADIPEX-P) 37.5 MG capsule 30 capsule 0     Sig: Take 1 capsule by mouth every morning for 30 days.  lisinopril (PRINIVIL;ZESTRIL) 10 MG tablet 90 tablet 3     Sig: Take 1 tablet by mouth daily       All patient questions answered. Patient voiced understanding. Quality Measures    Body mass index is 33.65 kg/m². Elevated. Weight control planned discussed Healthy diet and regular exercise. BP: (!) 150/94 Blood pressure is normal. Treatment plan consists of No treatment change needed.     Lab Results   Component Value Date    LDLCALC 83 06/12/2021    LDLDIRECT 99 11/08/2019    (goal LDL reduction with dx if diabetes is 50% LDL reduction)      PHQ Scores 5/20/2021 1/7/2020 2/6/2019 5/29/2018 3/27/2017   PHQ2 Score 3 0 0 0 0   PHQ9 Score 13 0 0 0 0     Interpretation of Total Score Depression Severity: 1-4 = Minimal depression, 5-9 = Mild depression, 10-14 = Moderate depression, 15-19 = Moderately severe depression, 20-27 = Severe depression

## 2022-04-04 ENCOUNTER — TELEPHONE (OUTPATIENT)
Dept: FAMILY MEDICINE CLINIC | Age: 37
End: 2022-04-04

## 2022-04-04 NOTE — TELEPHONE ENCOUNTER
Fax received from [address] requesting a PA on the patients Trulicity 0.4IG/0.5IO pens. PA submitted through CoverMyMeds, waiting on determination.

## 2022-04-06 NOTE — TELEPHONE ENCOUNTER
Outcome  Approved on April 5  Request Reference Number: [id number]. TRULICITY INJ 6.8/0.0 is approved through 04/04/2023. Your patient may now fill this prescription and it will be covered. Called and left a detailed msg for [employer] updating them on Trulicity approval.  Called and updated the patient, she voiced understanding.

## 2022-04-14 ENCOUNTER — OFFICE VISIT (OUTPATIENT)
Dept: FAMILY MEDICINE CLINIC | Age: 37
End: 2022-04-14
Payer: COMMERCIAL

## 2022-04-14 VITALS
RESPIRATION RATE: 20 BRPM | BODY MASS INDEX: 32.6 KG/M2 | SYSTOLIC BLOOD PRESSURE: 134 MMHG | WEIGHT: 202 LBS | DIASTOLIC BLOOD PRESSURE: 86 MMHG | HEART RATE: 108 BPM

## 2022-04-14 DIAGNOSIS — E88.81 METABOLIC SYNDROME: ICD-10-CM

## 2022-04-14 DIAGNOSIS — E66.9 OBESITY, CLASS I, BMI 30-34.9: Primary | ICD-10-CM

## 2022-04-14 DIAGNOSIS — E78.2 MIXED HYPERLIPIDEMIA: ICD-10-CM

## 2022-04-14 DIAGNOSIS — E11.9 TYPE 2 DIABETES MELLITUS WITHOUT COMPLICATION, WITHOUT LONG-TERM CURRENT USE OF INSULIN (HCC): ICD-10-CM

## 2022-04-14 DIAGNOSIS — I10 ESSENTIAL HYPERTENSION: ICD-10-CM

## 2022-04-14 PROCEDURE — G8427 DOCREV CUR MEDS BY ELIG CLIN: HCPCS | Performed by: NURSE PRACTITIONER

## 2022-04-14 PROCEDURE — 99214 OFFICE O/P EST MOD 30 MIN: CPT | Performed by: NURSE PRACTITIONER

## 2022-04-14 PROCEDURE — 4004F PT TOBACCO SCREEN RCVD TLK: CPT | Performed by: NURSE PRACTITIONER

## 2022-04-14 PROCEDURE — 3044F HG A1C LEVEL LT 7.0%: CPT | Performed by: NURSE PRACTITIONER

## 2022-04-14 PROCEDURE — G8417 CALC BMI ABV UP PARAM F/U: HCPCS | Performed by: NURSE PRACTITIONER

## 2022-04-14 PROCEDURE — 2022F DILAT RTA XM EVC RTNOPTHY: CPT | Performed by: NURSE PRACTITIONER

## 2022-04-14 RX ORDER — PHENTERMINE HYDROCHLORIDE 37.5 MG/1
37.5 CAPSULE ORAL EVERY MORNING
Qty: 30 CAPSULE | Refills: 0 | Status: SHIPPED | OUTPATIENT
Start: 2022-04-14 | End: 2022-05-14

## 2022-04-14 ASSESSMENT — ENCOUNTER SYMPTOMS
NAUSEA: 0
ABDOMINAL PAIN: 0
COUGH: 0

## 2022-04-14 NOTE — PROGRESS NOTES
Subjective:      Patient ID: Anibal Tom is a 40 y.o. female. HPI: 1 month Follow Up    Chief Complaint   Patient presents with    1 Month Follow-Up    Medication Refill       Maintained majority of weight loss from Adipex 8 months ago. Was restarted back on Adipex. 2nd months 6# lost.  Total 12# lost.     Has loss inches in which she has measured. Tolerated well. Has been consistent in going to the gym. Wt Readings from Last 3 Encounters:   04/14/22 202 lb (91.6 kg)   03/16/22 208 lb 8 oz (94.6 kg)   02/16/22 214 lb 12.8 oz (97.4 kg)     Body mass index is 32.6 kg/m². Vitals:    04/14/22 1331   BP: 134/86   Site: Left Upper Arm   Position: Sitting   Cuff Size: Large Adult   Pulse: 108   Resp: 20   Weight: 202 lb (91.6 kg)       Patient Active Problem List   Diagnosis    Asthma with bronchitis    Diabetes mellitus, type 2 ([facility] [address]    Hyperlipidemia    Metabolic syndrome    Obesity, Class I, BMI 30-34.9    Essential hypertension       ENT - Dr. Sabina Abebe - Dr. Jules Ghosh    Denies CP, SOB or chest tightness    Does feel better since working out more, eating better - physically and mentally. On Effexor 150 mg XL. Wt Readings from Last 3 Encounters:   04/14/22 202 lb (91.6 kg)   03/16/22 208 lb 8 oz (94.6 kg)   02/16/22 214 lb 12.8 oz (97.4 kg)       BP stable. Linisopril 10 mg. Microalbuminuria NEG. BP Readings from Last 3 Encounters:   04/14/22 134/86   03/16/22 (!) 150/94   02/16/22 136/66       Labs reviewed. On Metformin 4064 mg BID and Trulicity 1.5 mg     Lab Results   Component Value Date    LABA1C 6.6 (H) 02/14/2022    LABA1C 6.0 10/20/2021    LABA1C 8.0 (H) 06/12/2021     Lab Results   Component Value Date     02/14/2022       On Lipitor 20 mg. Tolerating well. LIPIDS well controlled.   Hysterectomy    No components found for: CHLPL  Lab Results   Component Value Date    TRIG 111 06/12/2021    TRIG 125 11/08/2019    TRIG 59 11/05/2018     Lab Results Component Value Date    HDL 42 06/12/2021    HDL 43 11/08/2019    HDL 43 11/05/2018     Lab Results   Component Value Date    LDLCALC 83 06/12/2021    LDLCALC 63 11/05/2018    LDLCALC 89 01/22/2018     No results found for: LABVLDL      Chemistry        Component Value Date/Time     06/12/2021 1016    K 4.1 06/12/2021 1016     06/12/2021 1016    CO2 24 06/12/2021 1016    BUN 9 06/12/2021 1016    CREATININE 0.6 06/12/2021 1016        Component Value Date/Time    CALCIUM 9.5 06/12/2021 1016    ALKPHOS 86 06/12/2021 1016    AST 29 06/12/2021 1016    ALT 43 06/12/2021 1016    BILITOT 0.5 06/12/2021 1016            Lab Results   Component Value Date    TSH 0.782 06/12/2021       Lab Results   Component Value Date    WBC 10.3 06/12/2021    HGB 15.8 06/12/2021    HCT 49.0 (H) 06/12/2021    MCV 87.0 06/12/2021     06/12/2021         Health Maintenance   Topic Date Due    Hepatitis C screen  Never done    Varicella vaccine (1 of 2 - 2-dose childhood series) Never done    Pneumococcal 0-64 years Vaccine (1 - PCV) Never done    Hepatitis B vaccine (1 of 3 - Risk 3-dose series) Never done    DTaP/Tdap/Td vaccine (1 - Tdap) Never done    Diabetic retinal exam  06/03/2021    COVID-19 Vaccine (2 - Booster for Denis series) 03/04/2022    Depression Monitoring  05/20/2022    Diabetic microalbuminuria test  06/12/2022    Lipid screen  06/12/2022    Potassium monitoring  06/12/2022    Creatinine monitoring  06/12/2022    Diabetic foot exam  07/20/2022    Flu vaccine (Season Ended) 09/01/2022    A1C test (Diabetic or Prediabetic)  02/14/2023    HIV screen  Addressed    Hepatitis A vaccine  Aged Out    Hib vaccine  Aged Out    Meningococcal (ACWY) vaccine  Aged [employer] History   Administered Date(s) Administered    COVID-19, J&J, PF, 0.5 mL 01/07/2022    Influenza Vaccine, unspecified formulation 09/29/2016    Influenza Virus Vaccine 09/26/2017, 09/25/2019       Review of Systems   Constitutional: Negative for chills and fever. HENT: Negative. Respiratory: Negative for cough. Gastrointestinal: Negative for abdominal pain and nausea. Skin: Negative for rash. Neurological: Negative for light-headedness. Psychiatric/Behavioral: Positive for dysphoric mood. Objective:   Physical Exam  Constitutional:       General: She is not in acute distress. Appearance: She is well-developed. HENT:      Right Ear: Tympanic membrane normal.      Left Ear: Tympanic membrane normal.      Nose: Nose normal.   Cardiovascular:      Rate and Rhythm: Normal rate and regular rhythm. Pulses: Normal pulses. Heart sounds: Normal heart sounds, S1 normal and S2 normal. No murmur heard. No S3 sounds. Pulmonary:      Effort: Pulmonary effort is normal.      Breath sounds: Normal breath sounds. No decreased breath sounds, wheezing or rhonchi. Abdominal:      General: Bowel sounds are normal.      Palpations: Abdomen is soft. Tenderness: There is no abdominal tenderness. Neurological:      Mental Status: She is alert and oriented to person, place, and time. Psychiatric:         Behavior: Behavior normal.         Assessment:       Diagnosis Orders   1. Obesity, Class I, BMI 30-34. 9  phentermine (ADIPEX-P) 37.5 MG capsule   2. Type 2 diabetes mellitus without complication, without long-term current use of insulin (HCC)  Hemoglobin A1C    MICROALBUMIN, RANDOM URINE (W/O CREATININE)   3. Mixed hyperlipidemia  CBC    Lipid Panel    Comprehensive Metabolic Panel   4. Essential hypertension     5. Metabolic syndrome             Plan:       6# lost  Diet and exercise continued  Adipex #3  Chronic conditions stable  Labs in 3 months  RTO in 3 months for Annual Exam      Juan Draft received counseling on the following healthy behaviors: nutrition and exercise  Reviewed prior labs and health maintenance  Continue current medications, diet and exercise.   Discussed use, benefit, and side effects of prescribed medications. Barriers to medication compliance addressed. Patient given educational materials - see patient instructions  Was a self-tracking handout given in paper form or via [employer]? No:     Requested Prescriptions     Signed Prescriptions Disp Refills    phentermine (ADIPEX-P) 37.5 MG capsule 30 capsule 0     Sig: Take 1 capsule by mouth every morning for 30 days. All patient questions answered. Patient voiced understanding. Quality Measures    Body mass index is 32.6 kg/m². Elevated. Weight control planned discussed Healthy diet and regular exercise. BP: 134/86 Blood pressure is normal. Treatment plan consists of No treatment change needed.     Lab Results   Component Value Date    LDLCALC 83 06/12/2021    LDLDIRECT 99 11/08/2019    (goal LDL reduction with dx if diabetes is 50% LDL reduction)      PHQ Scores 5/20/2021 1/7/2020 2/6/2019 5/29/2018 3/27/2017   PHQ2 Score 3 0 0 0 0   PHQ9 Score 13 0 0 0 0     Interpretation of Total Score Depression Severity: 1-4 = Minimal depression, 5-9 = Mild depression, 10-14 = Moderate depression, 15-19 = Moderately severe depression, 20-27 = Severe depression

## 2022-07-10 LAB
A/G RATIO: 1.5 (ref 1.5–2.5)
ABSOLUTE BASO #: 100 /CMM (ref 0–200)
ABSOLUTE EOS #: 100 /CMM (ref 0–500)
ABSOLUTE LYMPH #: 3200 /CMM (ref 1000–4800)
ABSOLUTE MONO #: 500 /CMM (ref 0–800)
ABSOLUTE NEUT #: 5900 /CMM (ref 1800–7700)
ALBUMIN SERPL-MCNC: 4.2 G/DL (ref 3.5–5)
ALP BLD-CCNC: 60 IU/L (ref 39–118)
ALT SERPL-CCNC: 21 IU/L (ref 10–40)
ANION GAP SERPL CALCULATED.3IONS-SCNC: 6 MMOL/L (ref 4–12)
AST SERPL-CCNC: 15 IU/L (ref 15–41)
BASOPHILS RELATIVE PERCENT: 0.9 % (ref 0–2)
BILIRUB SERPL-MCNC: 0.4 MG/DL (ref 0.2–1)
BUN BLDV-MCNC: 9 MG/DL (ref 7–20)
CALCIUM SERPL-MCNC: 8.9 MG/DL (ref 8.8–10.5)
CHLORIDE BLD-SCNC: 107 MEQ/L (ref 101–111)
CHOLESTEROL/HDL RELATIVE RISK: 3 (ref 4–4.4)
CHOLESTEROL: 136 MG/DL
CO2: 26 MEQ/L (ref 21–32)
CREAT SERPL-MCNC: 0.68 MG/DL (ref 0.6–1.3)
CREATININE CLEARANCE: >60
CREATININE, RANDOM URINE: 229.5 MG/DL
DIRECT-LDL / HDL RISK: 1.8
EOSINOPHILS RELATIVE PERCENT: 1.3 % (ref 0–6)
ESTIMATED AVERAGE GLUCOSE: 126 MG/DL
GLUCOSE: 89 MG/DL (ref 70–110)
HBA1C MFR BLD: 6 % (ref 4.4–6.4)
HCT VFR BLD CALC: 42.3 % (ref 35–44)
HDLC SERPL-MCNC: 45 MG/DL
HEMOGLOBIN: 14.5 GM/DL (ref 12–15)
LDL CHOLESTEROL DIRECT: 84 MG/DL
LYMPHOCYTES RELATIVE PERCENT: 32.4 % (ref 15–45)
MCH RBC QN AUTO: 29.4 PG (ref 27.5–33)
MCHC RBC AUTO-ENTMCNC: 34.4 GM/DL (ref 33–36)
MCV RBC AUTO: 85.5 CU MIC (ref 80–97)
MICROALBUMIN UR-MCNC: 193.3 MG/L
MICROALBUMIN/CREAT UR-RTO: 84.1 MG/GM (ref 0–30)
MONOCYTES RELATIVE PERCENT: 5.6 % (ref 2–10)
NEUTROPHILS RELATIVE PERCENT: 59.8 % (ref 40–70)
NUCLEATED RBCS: 0.1 /100 WBC
PDW BLD-RTO: 14.3 % (ref 12–16)
PLATELET # BLD: 253 TH/CMM (ref 150–400)
POTASSIUM SERPL-SCNC: 4.3 MEQ/L (ref 3.6–5)
RBC # BLD: 4.95 MIL/CMM (ref 4–5.1)
SODIUM BLD-SCNC: 139 MEQ/L (ref 135–145)
TOTAL PROTEIN: 7 G/DL (ref 6.2–8)
TRIGL SERPL-MCNC: 87 MG/DL
TSH REFLEX: 0.56 MCIU/ML (ref 0.49–4.67)
VLDLC SERPL CALC-MCNC: 17 MG/DL
WBC # BLD: 9.8 TH/CMM (ref 4.4–10.5)

## 2022-07-19 ENCOUNTER — OFFICE VISIT (OUTPATIENT)
Dept: FAMILY MEDICINE CLINIC | Age: 37
End: 2022-07-19
Payer: COMMERCIAL

## 2022-07-19 VITALS
WEIGHT: 204.8 LBS | HEIGHT: 66 IN | HEART RATE: 100 BPM | BODY MASS INDEX: 32.92 KG/M2 | SYSTOLIC BLOOD PRESSURE: 148 MMHG | DIASTOLIC BLOOD PRESSURE: 88 MMHG | RESPIRATION RATE: 20 BRPM

## 2022-07-19 DIAGNOSIS — E88.81 METABOLIC SYNDROME: ICD-10-CM

## 2022-07-19 DIAGNOSIS — Z00.00 WELL ADULT EXAM: Primary | ICD-10-CM

## 2022-07-19 DIAGNOSIS — I10 ESSENTIAL HYPERTENSION: ICD-10-CM

## 2022-07-19 DIAGNOSIS — E78.2 MIXED HYPERLIPIDEMIA: ICD-10-CM

## 2022-07-19 DIAGNOSIS — E66.9 OBESITY, CLASS I, BMI 30-34.9: ICD-10-CM

## 2022-07-19 DIAGNOSIS — F41.1 GAD (GENERALIZED ANXIETY DISORDER): ICD-10-CM

## 2022-07-19 DIAGNOSIS — E11.9 TYPE 2 DIABETES MELLITUS WITHOUT COMPLICATION, WITHOUT LONG-TERM CURRENT USE OF INSULIN (HCC): ICD-10-CM

## 2022-07-19 PROCEDURE — 99395 PREV VISIT EST AGE 18-39: CPT | Performed by: NURSE PRACTITIONER

## 2022-07-19 RX ORDER — DULAGLUTIDE 3 MG/.5ML
3 INJECTION, SOLUTION SUBCUTANEOUS WEEKLY
Qty: 12 PEN | Refills: 3 | Status: SHIPPED | OUTPATIENT
Start: 2022-07-19

## 2022-07-19 RX ORDER — LISINOPRIL AND HYDROCHLOROTHIAZIDE 20; 12.5 MG/1; MG/1
1 TABLET ORAL DAILY
Qty: 90 TABLET | Refills: 1 | Status: SHIPPED | OUTPATIENT
Start: 2022-07-19 | End: 2022-08-05

## 2022-07-19 ASSESSMENT — PATIENT HEALTH QUESTIONNAIRE - PHQ9
9. THOUGHTS THAT YOU WOULD BE BETTER OFF DEAD, OR OF HURTING YOURSELF: 0
SUM OF ALL RESPONSES TO PHQ QUESTIONS 1-9: 0
5. POOR APPETITE OR OVEREATING: 0
SUM OF ALL RESPONSES TO PHQ QUESTIONS 1-9: 0
1. LITTLE INTEREST OR PLEASURE IN DOING THINGS: 0
7. TROUBLE CONCENTRATING ON THINGS, SUCH AS READING THE NEWSPAPER OR WATCHING TELEVISION: 0
2. FEELING DOWN, DEPRESSED OR HOPELESS: 0
6. FEELING BAD ABOUT YOURSELF - OR THAT YOU ARE A FAILURE OR HAVE LET YOURSELF OR YOUR FAMILY DOWN: 0
SUM OF ALL RESPONSES TO PHQ9 QUESTIONS 1 & 2: 0
3. TROUBLE FALLING OR STAYING ASLEEP: 0
4. FEELING TIRED OR HAVING LITTLE ENERGY: 0
SUM OF ALL RESPONSES TO PHQ QUESTIONS 1-9: 0
10. IF YOU CHECKED OFF ANY PROBLEMS, HOW DIFFICULT HAVE THESE PROBLEMS MADE IT FOR YOU TO DO YOUR WORK, TAKE CARE OF THINGS AT HOME, OR GET ALONG WITH OTHER PEOPLE: 0
SUM OF ALL RESPONSES TO PHQ QUESTIONS 1-9: 0
8. MOVING OR SPEAKING SO SLOWLY THAT OTHER PEOPLE COULD HAVE NOTICED. OR THE OPPOSITE, BEING SO FIGETY OR RESTLESS THAT YOU HAVE BEEN MOVING AROUND A LOT MORE THAN USUAL: 0

## 2022-07-19 ASSESSMENT — ENCOUNTER SYMPTOMS
NAUSEA: 0
COUGH: 0
SHORTNESS OF BREATH: 0
ABDOMINAL PAIN: 0

## 2022-07-19 NOTE — PROGRESS NOTES
cSubjective:      Patient ID: Dragan Alva is a 40 y.o. female. HPI: 1 month Follow Up    Chief Complaint   Patient presents with    Annual Exam    3 Month Follow-Up       Patient Active Problem List   Diagnosis    Asthma with bronchitis    Diabetes mellitus, type 2 (HCC)    Hyperlipidemia    Metabolic syndrome    Obesity, Class I, BMI 30-34.9    Essential hypertension       ENT - Dr. Roberta Dakin - Dr. Mary Almazan    Denies CP, SOB or chest tightness    Does feel better since working out more, eating better - physically and mentally. On Effexor 150 mg XL. Wt Readings from Last 3 Encounters:   07/19/22 204 lb 12.8 oz (92.9 kg)   04/14/22 202 lb (91.6 kg)   03/16/22 208 lb 8 oz (94.6 kg)       BP stable. Linisopril 10 mg. Microalbuminuria NEG. BP Readings from Last 3 Encounters:   07/19/22 (!) 148/88   04/14/22 134/86   03/16/22 (!) 150/94       Labs reviewed. On Metformin 1000 mg Daily and Trulicity 1.5 mg     Lab Results   Component Value Date    LABA1C 6.0 07/09/2022    LABA1C 6.6 (H) 02/14/2022    LABA1C 6.0 10/20/2021     Lab Results   Component Value Date     07/09/2022       On Lipitor 20 mg. Tolerating well. LIPIDS well controlled.   Hysterectomy    No components found for: CHLPL  Lab Results   Component Value Date    TRIG 87 07/09/2022    TRIG 111 06/12/2021    TRIG 125 11/08/2019     Lab Results   Component Value Date    HDL 45 07/09/2022    HDL 42 06/12/2021    HDL 43 11/08/2019     Lab Results   Component Value Date    LDLCALC 83 06/12/2021    LDLCALC 63 11/05/2018    LDLCALC 89 01/22/2018     No results found for: LABVLDL      Chemistry        Component Value Date/Time     07/09/2022 1023    K 4.3 07/09/2022 1023     07/09/2022 1023    CO2 26 07/09/2022 1023    BUN 9 07/09/2022 1023    CREATININE 0.68 07/09/2022 1023        Component Value Date/Time    CALCIUM 8.90 07/09/2022 1023    ALKPHOS 60 07/09/2022 1023    AST 15 07/09/2022 1023    ALT 21 07/09/2022 1023 BILITOT 0.4 07/09/2022 1023            Lab Results   Component Value Date    TSH 0.782 06/12/2021       Lab Results   Component Value Date    WBC 9.8 07/09/2022    HGB 14.5 07/09/2022    HCT 42.3 07/09/2022    MCV 85.5 07/09/2022     07/09/2022         Health Maintenance   Topic Date Due    Varicella vaccine (1 of 2 - 2-dose childhood series) Never done    Pneumococcal 0-64 years Vaccine (1 - PCV) Never done    Hepatitis C screen  Never done    Hepatitis B vaccine (1 of 3 - Risk 3-dose series) Never done    DTaP/Tdap/Td vaccine (1 - Tdap) Never done    Diabetic retinal exam  06/03/2021    COVID-19 Vaccine (2 - Booster for Denis series) 03/04/2022    Depression Screen  05/20/2022    Diabetic foot exam  07/20/2022    Flu vaccine (1) 09/01/2022    A1C test (Diabetic or Prediabetic)  07/09/2023    Diabetic microalbuminuria test  07/09/2023    Lipids  07/09/2023    HIV screen  Addressed    Hepatitis A vaccine  Aged Out    Hib vaccine  Aged Out    Meningococcal (ACWY) vaccine  Aged [employer] History   Administered Date(s) Administered    COVID-19, J&J, (age 18y+), IM, 0.5 mL 01/07/2022    Influenza Vaccine, unspecified formulation 09/29/2016    Influenza Virus Vaccine 09/26/2017, 09/25/2019       Review of Systems   Constitutional:  Negative for chills and fever. HENT: Negative. Respiratory:  Negative for cough and shortness of breath. Cardiovascular:  Negative for chest pain. Gastrointestinal:  Negative for abdominal pain and nausea. Skin:  Negative for rash. Neurological:  Negative for dizziness, light-headedness and headaches. Psychiatric/Behavioral: Negative. Objective:   Physical Exam  Constitutional:       General: She is not in acute distress. Eyes:      Pupils: Pupils are equal, round, and reactive to light. Cardiovascular:      Rate and Rhythm: Normal rate and regular rhythm. Heart sounds: No murmur heard.   Pulmonary:      Effort: Pulmonary effort is normal. Breath sounds: Normal breath sounds. No wheezing. Abdominal:      General: Bowel sounds are normal. There is no distension. Palpations: Abdomen is soft. Tenderness: There is no abdominal tenderness. Musculoskeletal:         General: No tenderness. Normal range of motion. Cervical back: Normal range of motion and neck supple. Skin:     General: Skin is warm and dry. Findings: No rash. Assessment:       Diagnosis Orders   1. Well adult exam  Hemoglobin A1C      2. Type 2 diabetes mellitus without complication, without long-term current use of insulin (HCC)  Dulaglutide (TRULICITY) 3 BU/3.8AI SOPN      3. Mixed hyperlipidemia        4. Essential hypertension  lisinopril-hydroCHLOROthiazide (PRINZIDE;ZESTORETIC) 20-12.5 MG per tablet      5. Obesity, Class I, BMI 30-34.9        6. Metabolic syndrome        7. GREER (generalized anxiety disorder)                Plan:       Chronic conditions stable  Labs reviewed  Refills as above   - adjust BP medications as above   - increase Trulicity to more facilitate weight loss  Immunizations discussed  Labs as above in 4 months  RTO in 4 months          Haroldo Bucio received counseling on the following healthy behaviors: nutrition and exercise  Reviewed prior labs and health maintenance  Continue current medications, diet and exercise. Discussed use, benefit, and side effects of prescribed medications. Barriers to medication compliance addressed. Patient given educational materials - see patient instructions  Was a self-tracking handout given in paper form or via [employer]? No:     Requested Prescriptions     Signed Prescriptions Disp Refills    lisinopril-hydroCHLOROthiazide (PRINZIDE;ZESTORETIC) 20-12.5 MG per tablet 90 tablet 1     Sig: Take 1 tablet by mouth in the morning. Dulaglutide (TRULICITY) 3 LS/2.3XC SOPN 12 pen 3     Sig: Inject 3 mg into the skin once a week       All patient questions answered. Patient voiced understanding.     Quality Measures    Body mass index is 32.81 kg/m². Elevated. Weight control planned discussed Healthy diet and regular exercise. BP: (!) 148/88 Blood pressure is normal. Treatment plan consists of No treatment change needed.     Lab Results   Component Value Date    LDLCALC 83 06/12/2021    LDLDIRECT 84 07/09/2022    (goal LDL reduction with dx if diabetes is 50% LDL reduction)      PHQ Scores 7/19/2022 5/20/2021 1/7/2020 2/6/2019 5/29/2018 3/27/2017   PHQ2 Score 0 3 0 0 0 0   PHQ9 Score 0 13 0 0 0 0     Interpretation of Total Score Depression Severity: 1-4 = Minimal depression, 5-9 = Mild depression, 10-14 = Moderate depression, 15-19 = Moderately severe depression, 20-27 = Severe depression

## 2022-08-05 ENCOUNTER — PATIENT MESSAGE (OUTPATIENT)
Dept: FAMILY MEDICINE CLINIC | Age: 37
End: 2022-08-05

## 2022-08-05 RX ORDER — LISINOPRIL 30 MG/1
30 TABLET ORAL DAILY
Qty: 90 TABLET | Refills: 1 | Status: SHIPPED | OUTPATIENT
Start: 2022-08-05

## 2022-08-05 NOTE — TELEPHONE ENCOUNTER
From: Isaiah Oden  To: Ria Cordero  Sent: 8/5/2022 10:37 AM EDT  Subject: Lisinopril    You changed my dosage for this medication and my heart has been racing and I feel short of breath. Should I cut them in half or discontinue using it?

## 2022-09-02 ENCOUNTER — PATIENT MESSAGE (OUTPATIENT)
Dept: FAMILY MEDICINE CLINIC | Age: 37
End: 2022-09-02

## 2022-09-02 DIAGNOSIS — F41.1 GAD (GENERALIZED ANXIETY DISORDER): Primary | ICD-10-CM

## 2022-09-06 RX ORDER — DESVENLAFAXINE 50 MG/1
50 TABLET, EXTENDED RELEASE ORAL DAILY
Qty: 30 TABLET | Refills: 0 | Status: SHIPPED | OUTPATIENT
Start: 2022-09-06 | End: 2022-10-10

## 2022-09-06 NOTE — TELEPHONE ENCOUNTER
Hiren, Processor 9/6/2022 9:54 AM EDT    It has been a little easier with the new regime but is still not the best. Im willing to try something different if you send it in.  Thank you

## 2022-10-10 ENCOUNTER — PATIENT MESSAGE (OUTPATIENT)
Dept: FAMILY MEDICINE CLINIC | Age: 37
End: 2022-10-10

## 2022-10-10 DIAGNOSIS — F41.1 GAD (GENERALIZED ANXIETY DISORDER): Primary | ICD-10-CM

## 2022-10-10 RX ORDER — DULOXETIN HYDROCHLORIDE 60 MG/1
60 CAPSULE, DELAYED RELEASE ORAL DAILY
Qty: 30 CAPSULE | Refills: 1 | Status: SHIPPED | OUTPATIENT
Start: 2022-10-10

## 2022-10-10 NOTE — TELEPHONE ENCOUNTER
From: Reyna Solis  To: Mariia Davis  Sent: 10/10/2022 9:25 AM EDT  Subject: Pristiq    I did the 30 day on this med and didnt notice any change from what I was on previous to it. I know I cant go a month without until my appt with you, but those werent seeming to work for me. Do you have anything else in mind that will work and be cost effective?  Thank you!!

## 2022-11-14 LAB
ESTIMATED AVERAGE GLUCOSE: 128 MG/DL
HBA1C MFR BLD: 6.1 % (ref 4.4–6.4)

## 2022-11-17 ENCOUNTER — OFFICE VISIT (OUTPATIENT)
Dept: FAMILY MEDICINE CLINIC | Age: 37
End: 2022-11-17
Payer: COMMERCIAL

## 2022-11-17 VITALS
SYSTOLIC BLOOD PRESSURE: 120 MMHG | BODY MASS INDEX: 33.13 KG/M2 | WEIGHT: 206.8 LBS | DIASTOLIC BLOOD PRESSURE: 72 MMHG | HEART RATE: 76 BPM | RESPIRATION RATE: 16 BRPM

## 2022-11-17 DIAGNOSIS — E78.2 MIXED HYPERLIPIDEMIA: ICD-10-CM

## 2022-11-17 DIAGNOSIS — I10 ESSENTIAL HYPERTENSION: ICD-10-CM

## 2022-11-17 DIAGNOSIS — E66.9 OBESITY, CLASS I, BMI 30-34.9: ICD-10-CM

## 2022-11-17 DIAGNOSIS — E11.9 TYPE 2 DIABETES MELLITUS WITHOUT COMPLICATION, WITHOUT LONG-TERM CURRENT USE OF INSULIN (HCC): ICD-10-CM

## 2022-11-17 DIAGNOSIS — E88.81 METABOLIC SYNDROME: ICD-10-CM

## 2022-11-17 DIAGNOSIS — F41.1 GAD (GENERALIZED ANXIETY DISORDER): Primary | ICD-10-CM

## 2022-11-17 PROCEDURE — 3078F DIAST BP <80 MM HG: CPT | Performed by: NURSE PRACTITIONER

## 2022-11-17 PROCEDURE — G8417 CALC BMI ABV UP PARAM F/U: HCPCS | Performed by: NURSE PRACTITIONER

## 2022-11-17 PROCEDURE — 3074F SYST BP LT 130 MM HG: CPT | Performed by: NURSE PRACTITIONER

## 2022-11-17 PROCEDURE — G8484 FLU IMMUNIZE NO ADMIN: HCPCS | Performed by: NURSE PRACTITIONER

## 2022-11-17 PROCEDURE — 99214 OFFICE O/P EST MOD 30 MIN: CPT | Performed by: NURSE PRACTITIONER

## 2022-11-17 PROCEDURE — 3044F HG A1C LEVEL LT 7.0%: CPT | Performed by: NURSE PRACTITIONER

## 2022-11-17 PROCEDURE — 4004F PT TOBACCO SCREEN RCVD TLK: CPT | Performed by: NURSE PRACTITIONER

## 2022-11-17 PROCEDURE — 2022F DILAT RTA XM EVC RTNOPTHY: CPT | Performed by: NURSE PRACTITIONER

## 2022-11-17 PROCEDURE — G8427 DOCREV CUR MEDS BY ELIG CLIN: HCPCS | Performed by: NURSE PRACTITIONER

## 2022-11-17 RX ORDER — BUSPIRONE HYDROCHLORIDE 10 MG/1
10 TABLET ORAL 2 TIMES DAILY
Qty: 60 TABLET | Refills: 0 | Status: SHIPPED | OUTPATIENT
Start: 2022-11-17 | End: 2022-12-17

## 2022-11-17 ASSESSMENT — ENCOUNTER SYMPTOMS
ABDOMINAL PAIN: 0
NAUSEA: 0
SHORTNESS OF BREATH: 0
COUGH: 0

## 2022-11-17 NOTE — PROGRESS NOTES
cSubjective:      Patient ID: Cristino Solis is a 40 y.o. female. HPI: 1 month Follow Up    Chief Complaint   Patient presents with    Follow-up    Results    Diabetes       Patient Active Problem List   Diagnosis    Asthma with bronchitis    Diabetes mellitus, type 2 (HCC)    Hyperlipidemia    Metabolic syndrome    Obesity, Class I, BMI 30-34.9    Essential hypertension       ENT - Dr. Carlin Gallegos - Dr. Sabiha Licona    Denies CP, SOB or chest tightness    Placed on Cymbalta 60 mg  Failed on Effexor and Pristiq. No better. Stressors. Anxiety dominant. Overwhelmed. Wt Readings from Last 3 Encounters:   11/17/22 206 lb 12.8 oz (93.8 kg)   07/19/22 204 lb 12.8 oz (92.9 kg)   04/14/22 202 lb (91.6 kg)       BP stable. Linisopril 30 mg. Microalbuminuria NEG. BP Readings from Last 3 Encounters:   11/17/22 120/72   07/19/22 (!) 148/88   04/14/22 134/86       Labs reviewed. On Metformin 1000 mg Daily and Trulicity 3 mg     Lab Results   Component Value Date    LABA1C 6.1 11/14/2022    LABA1C 6.0 07/09/2022    LABA1C 6.6 (H) 02/14/2022     Lab Results   Component Value Date     11/14/2022       On Lipitor 20 mg. Tolerating well. LIPIDS well controlled.   Hysterectomy    No components found for: CHLPL  Lab Results   Component Value Date    TRIG 87 07/09/2022    TRIG 111 06/12/2021    TRIG 125 11/08/2019     Lab Results   Component Value Date    HDL 45 07/09/2022    HDL 42 06/12/2021    HDL 43 11/08/2019     Lab Results   Component Value Date    LDLCALC 83 06/12/2021    LDLCALC 63 11/05/2018    LDLCALC 89 01/22/2018     No results found for: LABVLDL      Chemistry        Component Value Date/Time     07/09/2022 1023    K 4.3 07/09/2022 1023     07/09/2022 1023    CO2 26 07/09/2022 1023    BUN 9 07/09/2022 1023    CREATININE 0.68 07/09/2022 1023        Component Value Date/Time    CALCIUM 8.90 07/09/2022 1023    ALKPHOS 60 07/09/2022 1023    AST 15 07/09/2022 1023    ALT 21 07/09/2022 1023 BILITOT 0.4 07/09/2022 1023            Lab Results   Component Value Date    TSH 0.782 06/12/2021       Lab Results   Component Value Date    WBC 9.8 07/09/2022    HGB 14.5 07/09/2022    HCT 42.3 07/09/2022    MCV 85.5 07/09/2022     07/09/2022         Health Maintenance   Topic Date Due    Varicella vaccine (1 of 2 - 2-dose childhood series) Never done    Pneumococcal 0-64 years Vaccine (1 - PCV) Never done    Hepatitis C screen  Never done    Hepatitis B vaccine (1 of 3 - Risk 3-dose series) Never done    DTaP/Tdap/Td vaccine (1 - Tdap) Never done    Diabetic retinal exam  06/03/2021    COVID-19 Vaccine (2 - Booster for Denis series) 03/04/2022    Diabetic foot exam  07/20/2022    Flu vaccine (1) 08/01/2022    Diabetic microalbuminuria test  07/09/2023    Lipids  07/09/2023    Depression Screen  07/19/2023    A1C test (Diabetic or Prediabetic)  11/14/2023    HIV screen  Addressed    Hepatitis A vaccine  Aged Out    Hib vaccine  Aged Out    Meningococcal (ACWY) vaccine  Aged [employer] History   Administered Date(s) Administered    COVID-19, J&J, (age 18y+), IM, 0.5 mL 01/07/2022    Influenza Vaccine, unspecified formulation 09/29/2016    Influenza Virus Vaccine 09/26/2017, 09/25/2019       Review of Systems   Constitutional:  Negative for chills and fever. HENT: Negative. Respiratory:  Negative for cough and shortness of breath. Gastrointestinal:  Negative for abdominal pain and nausea. Skin:  Negative for rash. Neurological:  Negative for light-headedness. Psychiatric/Behavioral: Negative. Objective:   Physical Exam  Constitutional:       General: She is not in acute distress. Eyes:      Pupils: Pupils are equal, round, and reactive to light. Cardiovascular:      Rate and Rhythm: Normal rate and regular rhythm. Heart sounds: No murmur heard. Pulmonary:      Effort: Pulmonary effort is normal.      Breath sounds: Normal breath sounds. No wheezing.    Abdominal: General: Bowel sounds are normal. There is no distension. Palpations: Abdomen is soft. Tenderness: There is no abdominal tenderness. Musculoskeletal:         General: No tenderness. Normal range of motion. Cervical back: Normal range of motion and neck supple. Skin:     General: Skin is warm and dry. Findings: No rash. Assessment:       Diagnosis Orders   1. GREER (generalized anxiety disorder)  busPIRone (BUSPAR) 10 MG tablet      2. Type 2 diabetes mellitus without complication, without long-term current use of insulin ([facility])  CBC    Hemoglobin A1C    MICROALBUMIN, RANDOM URINE (W/O CREATININE)      3. Mixed hyperlipidemia  Comprehensive Metabolic Panel    TSH with Reflex    Lipid Panel      4. Essential hypertension        5. Obesity, Class I, BMI 30-34.9        6. Metabolic syndrome                  Plan:       Chronic conditions stable  Labs reviewed  Refills as above   - stop Cymbalta, start buspar 10 mg BID  NOn-pharm anxiety tx discussed  Immunizations discussed  Labs as above in 6 months  RTO in 6 months      Rebeca Quintanilla received counseling on the following healthy behaviors: nutrition and exercise  Reviewed prior labs and health maintenance  Continue current medications, diet and exercise. Discussed use, benefit, and side effects of prescribed medications. Barriers to medication compliance addressed. Patient given educational materials - see patient instructions  Was a self-tracking handout given in paper form or via [employer]? No:     Requested Prescriptions     Signed Prescriptions Disp Refills    busPIRone (BUSPAR) 10 MG tablet 60 tablet 0     Sig: Take 1 tablet by mouth 2 times daily       All patient questions answered. Patient voiced understanding. Quality Measures    Body mass index is 33.13 kg/m². Elevated. Weight control planned discussed Healthy diet and regular exercise.     BP: 120/72 Blood pressure is normal. Treatment plan consists of No treatment change needed.     Lab Results   Component Value Date    LDLCALC 83 06/12/2021    LDLDIRECT 84 07/09/2022    (goal LDL reduction with dx if diabetes is 50% LDL reduction)      PHQ Scores 7/19/2022 5/20/2021 1/7/2020 2/6/2019 5/29/2018 3/27/2017   PHQ2 Score 0 3 0 0 0 0   PHQ9 Score 0 13 0 0 0 0     Interpretation of Total Score Depression Severity: 1-4 = Minimal depression, 5-9 = Mild depression, 10-14 = Moderate depression, 15-19 = Moderately severe depression, 20-27 = Severe depression

## 2023-01-12 ENCOUNTER — PATIENT MESSAGE (OUTPATIENT)
Dept: FAMILY MEDICINE CLINIC | Age: 38
End: 2023-01-12

## 2023-01-12 DIAGNOSIS — F41.1 GAD (GENERALIZED ANXIETY DISORDER): ICD-10-CM

## 2023-01-12 RX ORDER — BUSPIRONE HYDROCHLORIDE 10 MG/1
10 TABLET ORAL DAILY
Qty: 90 TABLET | Refills: 3 | Status: SHIPPED | OUTPATIENT
Start: 2023-01-12 | End: 2024-01-07

## 2023-01-12 NOTE — TELEPHONE ENCOUNTER
From: Rosita Robles  To: Payton Recinos  Sent: 1/12/2023 9:29 AM EST  Subject: Buspar    You put me on this to take 2x a day and I took it only 1x per day. Im now out of this medication as I wasnt paying attention to how many I had leftI think I did okay on this one if you want to send in a new script of it.

## 2023-02-13 ENCOUNTER — OFFICE VISIT (OUTPATIENT)
Dept: FAMILY MEDICINE CLINIC | Age: 38
End: 2023-02-13

## 2023-02-13 VITALS
HEART RATE: 84 BPM | DIASTOLIC BLOOD PRESSURE: 66 MMHG | BODY MASS INDEX: 32.73 KG/M2 | WEIGHT: 204.3 LBS | RESPIRATION RATE: 18 BRPM | SYSTOLIC BLOOD PRESSURE: 108 MMHG

## 2023-02-13 DIAGNOSIS — N83.201 CYST OF OVARY, RIGHT: ICD-10-CM

## 2023-02-13 DIAGNOSIS — R10.31 ACUTE RIGHT LOWER QUADRANT PAIN: Primary | ICD-10-CM

## 2023-02-13 DIAGNOSIS — R10.30 LOWER ABDOMINAL PAIN: ICD-10-CM

## 2023-02-13 LAB
BILIRUBIN, POC: NEGATIVE
BLOOD URINE, POC: NORMAL
CLARITY, POC: NORMAL
COLOR, POC: YELLOW
GLUCOSE URINE, POC: NEGATIVE
KETONES, POC: NEGATIVE
LEUKOCYTE EST, POC: NEGATIVE
NITRITE, POC: NEGATIVE
PH, POC: 7
PROTEIN, POC: NORMAL
SPECIFIC GRAVITY, POC: 1.02
UROBILINOGEN, POC: 0.2

## 2023-02-13 RX ORDER — KETOROLAC TROMETHAMINE 10 MG/1
10 TABLET, FILM COATED ORAL EVERY 6 HOURS PRN
Qty: 20 TABLET | Refills: 0 | Status: SHIPPED | OUTPATIENT
Start: 2023-02-13 | End: 2024-02-13

## 2023-02-13 RX ORDER — LISINOPRIL AND HYDROCHLOROTHIAZIDE 20; 12.5 MG/1; MG/1
1 TABLET ORAL DAILY
COMMUNITY

## 2023-02-13 SDOH — ECONOMIC STABILITY: INCOME INSECURITY: HOW HARD IS IT FOR YOU TO PAY FOR THE VERY BASICS LIKE FOOD, HOUSING, MEDICAL CARE, AND HEATING?: NOT VERY HARD

## 2023-02-13 SDOH — ECONOMIC STABILITY: HOUSING INSECURITY
IN THE LAST 12 MONTHS, WAS THERE A TIME WHEN YOU DID NOT HAVE A STEADY PLACE TO SLEEP OR SLEPT IN A SHELTER (INCLUDING NOW)?: NO

## 2023-02-13 SDOH — ECONOMIC STABILITY: FOOD INSECURITY: WITHIN THE PAST 12 MONTHS, YOU WORRIED THAT YOUR FOOD WOULD RUN OUT BEFORE YOU GOT MONEY TO BUY MORE.: NEVER TRUE

## 2023-02-13 SDOH — ECONOMIC STABILITY: FOOD INSECURITY: WITHIN THE PAST 12 MONTHS, THE FOOD YOU BOUGHT JUST DIDN'T LAST AND YOU DIDN'T HAVE MONEY TO GET MORE.: NEVER TRUE

## 2023-02-13 ASSESSMENT — ENCOUNTER SYMPTOMS
ABDOMINAL PAIN: 1
SHORTNESS OF BREATH: 0
COUGH: 0

## 2023-02-13 ASSESSMENT — PATIENT HEALTH QUESTIONNAIRE - PHQ9
SUM OF ALL RESPONSES TO PHQ QUESTIONS 1-9: 0
2. FEELING DOWN, DEPRESSED OR HOPELESS: 0
SUM OF ALL RESPONSES TO PHQ9 QUESTIONS 1 & 2: 0
1. LITTLE INTEREST OR PLEASURE IN DOING THINGS: 0
SUM OF ALL RESPONSES TO PHQ QUESTIONS 1-9: 0

## 2023-02-13 NOTE — PROGRESS NOTES
cSubjective:      Patient ID: Paul Herndon is a 45 y.o. female. HPI: 1 month Follow Up    Chief Complaint   Patient presents with    Abdominal Pain     Lower right that goes around to the side       Onset of 4-5 days with RLQ pain. Acute onset. Below belt line into groin. Wraps around to lower back. Denies flank pain. Urinary frequency. Pain to push on area. Denies pain with movement. Constant. No bowel changes. Denies N/V/D. No burning or urgency with urination. Underlying DM    Hx of partial hysterectomy. Patient Active Problem List   Diagnosis    Asthma with bronchitis    Diabetes mellitus, type 2 (HCC)    Hyperlipidemia    Metabolic syndrome    Obesity, Class I, BMI 30-34.9    Essential hypertension       ENT - Dr. Pramod Gallardo - Dr. Janet Small    Denies CP, SOB or chest tightness    Placed on Cymbalta 60 mg  Failed on Effexor and Pristiq. No better. Stressors. Anxiety dominant. Overwhelmed. Wt Readings from Last 3 Encounters:   02/13/23 204 lb 4.8 oz (92.7 kg)   11/17/22 206 lb 12.8 oz (93.8 kg)   07/19/22 204 lb 12.8 oz (92.9 kg)       BP stable. Linisopril 30 mg. Microalbuminuria NEG. BP Readings from Last 3 Encounters:   02/13/23 108/66   11/17/22 120/72   07/19/22 (!) 148/88       Labs reviewed. On Metformin 1000 mg Daily and Trulicity 3 mg     Lab Results   Component Value Date    LABA1C 6.1 11/14/2022    LABA1C 6.0 07/09/2022    LABA1C 6.6 (H) 02/14/2022     Lab Results   Component Value Date     11/14/2022       On Lipitor 20 mg. Tolerating well. LIPIDS well controlled.   Hysterectomy    No components found for: CHLPL  Lab Results   Component Value Date    TRIG 87 07/09/2022    TRIG 111 06/12/2021    TRIG 125 11/08/2019     Lab Results   Component Value Date    HDL 45 07/09/2022    HDL 42 06/12/2021    HDL 43 11/08/2019     Lab Results   Component Value Date    LDLCALC 83 06/12/2021    LDLCALC 63 11/05/2018    LDLCALC 89 01/22/2018     No results found for: LABVLDL      Chemistry        Component Value Date/Time     07/09/2022 1023    K 4.3 07/09/2022 1023     07/09/2022 1023    CO2 26 07/09/2022 1023    BUN 9 07/09/2022 1023    CREATININE 0.68 07/09/2022 1023        Component Value Date/Time    CALCIUM 8.90 07/09/2022 1023    ALKPHOS 60 07/09/2022 1023    AST 15 07/09/2022 1023    ALT 21 07/09/2022 1023    BILITOT 0.4 07/09/2022 1023            Lab Results   Component Value Date    TSH 0.782 06/12/2021       Lab Results   Component Value Date    WBC 9.8 07/09/2022    HGB 14.5 07/09/2022    HCT 42.3 07/09/2022    MCV 85.5 07/09/2022     07/09/2022         Health Maintenance   Topic Date Due    Varicella vaccine (1 of 2 - 2-dose childhood series) Never done    Pneumococcal 0-64 years Vaccine (1 - PCV) Never done    Hepatitis C screen  Never done    Hepatitis B vaccine (1 of 3 - Risk 3-dose series) Never done    DTaP/Tdap/Td vaccine (1 - Tdap) Never done    Diabetic retinal exam  06/03/2021    COVID-19 Vaccine (2 - Booster for Denis series) 03/04/2022    GFR test (Diabetes, CKD 3-4, OR last GFR 15-59)  06/12/2022    Diabetic foot exam  07/20/2022    Flu vaccine (1) 08/01/2022    Diabetic Alb to Cr ratio (uACR) test  07/09/2023    Lipids  07/09/2023    A1C test (Diabetic or Prediabetic)  11/14/2023    Depression Screen  02/13/2024    HIV screen  Addressed    Hepatitis A vaccine  Aged Out    Hib vaccine  Aged Out    Meningococcal (ACWY) vaccine  Aged [employer] History   Administered Date(s) Administered    COVID-19, J&J, (age 18y+), IM, 0.5 mL 01/07/2022    Influenza Vaccine, unspecified formulation 09/29/2016    Influenza Virus Vaccine 09/26/2017, 09/25/2019       Review of Systems   HENT: Negative. Respiratory:  Negative for cough and shortness of breath. Gastrointestinal:  Positive for abdominal pain. Objective:   Physical Exam  Constitutional:       General: She is not in acute distress.   Eyes:      Pupils: Pupils are equal, round, and reactive to light. Cardiovascular:      Rate and Rhythm: Regular rhythm. Heart sounds: No murmur heard. Pulmonary:      Effort: Pulmonary effort is normal.      Breath sounds: Normal breath sounds. No wheezing. Abdominal:      General: Bowel sounds are normal. There is no distension. Palpations: Abdomen is soft. Tenderness: There is abdominal tenderness in the right lower quadrant. There is no guarding or rebound. Negative signs include McBurney's sign. Musculoskeletal:         General: No tenderness. Normal range of motion. Cervical back: Normal range of motion and neck supple. Skin:     General: Skin is warm and dry. Findings: No rash. Assessment:       Diagnosis Orders   1. Acute right lower quadrant pain  ketorolac (TORADOL) 10 MG tablet    US NON OB TRANSVAGINAL      2. Cyst of ovary, right  US NON OB TRANSVAGINAL      3. Lower abdominal pain  POCT Urinalysis No Micro (Auto)                Plan:       UA: NEG  HPI NEG for GI or  complaints   Ovarian related? Toradol PO  US Transvaginal to evaluate ovarian cyst  RTO PRN    Stefania Price received counseling on the following healthy behaviors: nutrition and exercise  Reviewed prior labs and health maintenance  Continue current medications, diet and exercise. Discussed use, benefit, and side effects of prescribed medications. Barriers to medication compliance addressed. Patient given educational materials - see patient instructions  Was a self-tracking handout given in paper form or via [employer]? No:     Requested Prescriptions     Signed Prescriptions Disp Refills    ketorolac (TORADOL) 10 MG tablet 20 tablet 0     Sig: Take 1 tablet by mouth every 6 hours as needed for Pain       All patient questions answered. Patient voiced understanding. Quality Measures    Body mass index is 32.73 kg/m². Elevated. Weight control planned discussed Healthy diet and regular exercise.     BP: 108/66 Blood pressure is normal. Treatment plan consists of No treatment change needed.     Lab Results   Component Value Date    LDLCALC 83 06/12/2021    LDLDIRECT 84 07/09/2022    (goal LDL reduction with dx if diabetes is 50% LDL reduction)      PHQ Scores 2/13/2023 7/19/2022 5/20/2021 1/7/2020 2/6/2019 5/29/2018 3/27/2017   PHQ2 Score 0 0 3 0 0 0 0   PHQ9 Score 0 0 13 0 0 0 0     Interpretation of Total Score Depression Severity: 1-4 = Minimal depression, 5-9 = Mild depression, 10-14 = Moderate depression, 15-19 = Moderately severe depression, 20-27 = Severe depression

## 2023-02-13 NOTE — PROGRESS NOTES
Called and scheduled the patient for her Benjamine Bones for 2/15/23 at 5pm the main hospital.  To arrive at 4:45pm main radiology.

## 2023-02-15 ENCOUNTER — HOSPITAL ENCOUNTER (OUTPATIENT)
Dept: ULTRASOUND IMAGING | Age: 38
Discharge: HOME | End: 2023-02-15
Payer: COMMERCIAL

## 2023-02-15 DIAGNOSIS — R10.31 ACUTE RIGHT LOWER QUADRANT PAIN: ICD-10-CM

## 2023-02-15 DIAGNOSIS — N83.201 CYST OF OVARY, RIGHT: ICD-10-CM

## 2023-02-15 PROCEDURE — 76830 TRANSVAGINAL US NON-OB: CPT

## 2023-02-27 DIAGNOSIS — E11.9 TYPE 2 DIABETES MELLITUS WITHOUT COMPLICATION, WITHOUT LONG-TERM CURRENT USE OF INSULIN (HCC): Primary | ICD-10-CM

## 2023-02-27 RX ORDER — DULAGLUTIDE 4.5 MG/.5ML
4.5 INJECTION, SOLUTION SUBCUTANEOUS WEEKLY
Qty: 2 ML | Refills: 5 | Status: SHIPPED | OUTPATIENT
Start: 2023-02-27 | End: 2023-02-27 | Stop reason: SDUPTHER

## 2023-02-27 RX ORDER — DULAGLUTIDE 4.5 MG/.5ML
4.5 INJECTION, SOLUTION SUBCUTANEOUS WEEKLY
Qty: 2 ML | Refills: 5 | Status: SHIPPED | OUTPATIENT
Start: 2023-02-27

## 2023-02-27 NOTE — TELEPHONE ENCOUNTER
Pt called office stating Deonna Irwin has been out of her Trulicity 3mg dose. She called [facility] and they have the 1.5mg and 4.5mg dose in stock. She is asking if you were able to call in one of those dose to [facility]. Call pt back. Please advise.

## 2023-02-27 NOTE — TELEPHONE ENCOUNTER
Spoke with pt, 4.5 mg was sent to [facility]. Trulicity 4.5 mg cancelled with [facility] via detailed VM. Please send script to [facility] [address]. Pharmacy updated in chart. If no call back pt will check with the pharmacy at 3 pm today.

## 2023-03-07 ENCOUNTER — TELEPHONE (OUTPATIENT)
Dept: FAMILY MEDICINE CLINIC | Age: 38
End: 2023-03-07

## 2023-03-07 NOTE — TELEPHONE ENCOUNTER
Outcome  Approved today  Request Reference Number: [id number]. TRULICITY INJ 2.0/0.1 is approved through 03/07/2024. Your patient may now fill this prescription and it will be covered. Called [facility] and Updated [facility], she is to order the medication and will notify the patient when it comes in. Called and updated the patient, she voiced understanding.

## 2023-03-07 NOTE — TELEPHONE ENCOUNTER
Received a PA request for the patients Trulicity through CoverMyMeds. PA submitted, waiting on determination.

## 2023-04-05 DIAGNOSIS — E78.2 MIXED HYPERLIPIDEMIA: ICD-10-CM

## 2023-04-05 DIAGNOSIS — E11.9 TYPE 2 DIABETES MELLITUS WITHOUT COMPLICATION, WITHOUT LONG-TERM CURRENT USE OF INSULIN (HCC): ICD-10-CM

## 2023-04-05 RX ORDER — ATORVASTATIN CALCIUM 20 MG/1
TABLET, FILM COATED ORAL
Qty: 90 TABLET | Refills: 0 | Status: SHIPPED | OUTPATIENT
Start: 2023-04-05

## 2023-05-12 LAB
A/G RATIO: 1.5 (ref 1.5–2.5)
ABSOLUTE BASO #: 100 /CMM (ref 0–200)
ABSOLUTE EOS #: 200 /CMM (ref 0–500)
ABSOLUTE LYMPH #: 2500 /CMM (ref 1000–4800)
ABSOLUTE MONO #: 600 /CMM (ref 0–800)
ABSOLUTE NEUT #: 4800 /CMM (ref 1800–7700)
ALBUMIN SERPL-MCNC: 4.2 G/DL (ref 3.5–5)
ALP BLD-CCNC: 58 IU/L (ref 39–118)
ALT SERPL-CCNC: 17 IU/L (ref 10–40)
ANION GAP SERPL CALCULATED.3IONS-SCNC: 8 MMOL/L (ref 4–12)
AST SERPL-CCNC: 14 IU/L (ref 15–41)
BASOPHILS RELATIVE PERCENT: 0.7 % (ref 0–2)
BILIRUB SERPL-MCNC: 0.6 MG/DL (ref 0.2–1)
BUN BLDV-MCNC: 15 MG/DL (ref 7–20)
CALCIUM SERPL-MCNC: 9.4 MG/DL (ref 8.8–10.5)
CHLORIDE BLD-SCNC: 103 MEQ/L (ref 101–111)
CHOLESTEROL/HDL RELATIVE RISK: 3.1 (ref 4–4.4)
CHOLESTEROL: 122 MG/DL
CO2: 27 MEQ/L (ref 21–32)
CREAT SERPL-MCNC: 0.87 MG/DL (ref 0.6–1.3)
CREATININE CLEARANCE: >60
CREATININE, RANDOM URINE: 172.5 MG/DL
DIRECT-LDL / HDL RISK: 1.9
EOSINOPHILS RELATIVE PERCENT: 1.9 % (ref 0–6)
ESTIMATED AVERAGE GLUCOSE: 117 MG/DL
GLUCOSE: 94 MG/DL (ref 70–110)
HBA1C MFR BLD: 5.7 % (ref 4.4–6.4)
HCT VFR BLD CALC: 40.3 % (ref 35–44)
HDLC SERPL-MCNC: 39 MG/DL
HEMOGLOBIN: 13.8 GM/DL (ref 12–15)
LDL CHOLESTEROL DIRECT: 76 MG/DL
LYMPHOCYTES RELATIVE PERCENT: 30.5 % (ref 15–45)
MCH RBC QN AUTO: 29.1 PG (ref 27.5–33)
MCHC RBC AUTO-ENTMCNC: 34.3 GM/DL (ref 33–36)
MCV RBC AUTO: 84.8 CU MIC (ref 80–97)
MICROALBUMIN UR-MCNC: 18.9 MG/L
MICROALBUMIN/CREAT UR-RTO: 10.9 MG/GM (ref 0–30)
MONOCYTES RELATIVE PERCENT: 7.8 % (ref 2–10)
NEUTROPHILS RELATIVE PERCENT: 59.1 % (ref 40–70)
NUCLEATED RBCS: 0.1 /100 WBC
PDW BLD-RTO: 13.8 % (ref 12–16)
PLATELET # BLD: 216 TH/CMM (ref 150–400)
POTASSIUM SERPL-SCNC: 4 MEQ/L (ref 3.6–5)
RBC # BLD: 4.75 MIL/CMM (ref 4–5.1)
SODIUM BLD-SCNC: 138 MEQ/L (ref 135–145)
TOTAL PROTEIN: 7 G/DL (ref 6.2–8)
TRIGL SERPL-MCNC: 74 MG/DL
TSH REFLEX: 0.59 MCIU/ML (ref 0.49–4.67)
VLDLC SERPL CALC-MCNC: 14 MG/DL
WBC # BLD: 8.1 TH/CMM (ref 4.4–10.5)

## 2023-05-17 ENCOUNTER — OFFICE VISIT (OUTPATIENT)
Dept: FAMILY MEDICINE CLINIC | Age: 38
End: 2023-05-17
Payer: COMMERCIAL

## 2023-05-17 VITALS
HEART RATE: 88 BPM | DIASTOLIC BLOOD PRESSURE: 68 MMHG | SYSTOLIC BLOOD PRESSURE: 116 MMHG | WEIGHT: 196.6 LBS | BODY MASS INDEX: 31.49 KG/M2 | RESPIRATION RATE: 16 BRPM

## 2023-05-17 DIAGNOSIS — E78.2 MIXED HYPERLIPIDEMIA: ICD-10-CM

## 2023-05-17 DIAGNOSIS — E66.9 OBESITY, CLASS I, BMI 30-34.9: Primary | ICD-10-CM

## 2023-05-17 DIAGNOSIS — E11.9 TYPE 2 DIABETES MELLITUS WITHOUT COMPLICATION, WITHOUT LONG-TERM CURRENT USE OF INSULIN (HCC): ICD-10-CM

## 2023-05-17 DIAGNOSIS — I10 ESSENTIAL HYPERTENSION: ICD-10-CM

## 2023-05-17 DIAGNOSIS — F41.1 GAD (GENERALIZED ANXIETY DISORDER): ICD-10-CM

## 2023-05-17 PROCEDURE — 1036F TOBACCO NON-USER: CPT | Performed by: NURSE PRACTITIONER

## 2023-05-17 PROCEDURE — G8427 DOCREV CUR MEDS BY ELIG CLIN: HCPCS | Performed by: NURSE PRACTITIONER

## 2023-05-17 PROCEDURE — G8417 CALC BMI ABV UP PARAM F/U: HCPCS | Performed by: NURSE PRACTITIONER

## 2023-05-17 PROCEDURE — 99214 OFFICE O/P EST MOD 30 MIN: CPT | Performed by: NURSE PRACTITIONER

## 2023-05-17 PROCEDURE — 3074F SYST BP LT 130 MM HG: CPT | Performed by: NURSE PRACTITIONER

## 2023-05-17 PROCEDURE — 3078F DIAST BP <80 MM HG: CPT | Performed by: NURSE PRACTITIONER

## 2023-05-17 PROCEDURE — 2022F DILAT RTA XM EVC RTNOPTHY: CPT | Performed by: NURSE PRACTITIONER

## 2023-05-17 PROCEDURE — 3044F HG A1C LEVEL LT 7.0%: CPT | Performed by: NURSE PRACTITIONER

## 2023-05-17 RX ORDER — PHENTERMINE HYDROCHLORIDE 37.5 MG/1
37.5 CAPSULE ORAL EVERY MORNING
Qty: 30 CAPSULE | Refills: 0 | Status: SHIPPED | OUTPATIENT
Start: 2023-05-17 | End: 2023-06-16

## 2023-05-17 ASSESSMENT — ENCOUNTER SYMPTOMS
ABDOMINAL PAIN: 0
SHORTNESS OF BREATH: 0
COUGH: 0
NAUSEA: 0

## 2023-05-17 NOTE — PROGRESS NOTES
cSubjective:      Patient ID: Beata Rose is a 45 y.o. female. HPI: 1 month Follow Up    Chief Complaint   Patient presents with    6 Month Follow-Up    Discuss Labs       Patient Active Problem List   Diagnosis    Asthma with bronchitis    Diabetes mellitus, type 2 ([address]    Hyperlipidemia    Metabolic syndrome    Obesity, Class I, BMI 30-34.9    Essential hypertension       ENT - Dr. Day Tomas - Dr. Chica Haque    Denies CP, SOB or chest tightness    On Buspar 10 mg Daily. Failed on Cymbalta, Effexor and Pristiq. No better. Stressors. Anxiety dominant. Overwhelmed. Wt Readings from Last 3 Encounters:   05/17/23 196 lb 9.6 oz (89.2 kg)   02/13/23 204 lb 4.8 oz (92.7 kg)   11/17/22 206 lb 12.8 oz (93.8 kg)       BP stable. Prinzide 20-12.5 mg.   Microalbuminuria NEG. BP Readings from Last 3 Encounters:   05/17/23 116/68   02/13/23 108/66   11/17/22 120/72       Labs reviewed. Weight continue track down 10#. On Metformin 1000 mg Daily and Trulicity 4.5 mg     Lab Results   Component Value Date    LABA1C 5.7 05/12/2023    LABA1C 6.1 11/14/2022    LABA1C 6.0 07/09/2022     Lab Results   Component Value Date     05/12/2023       On Lipitor 20 mg. Tolerating well. LIPIDS well controlled.   Hysterectomy    No components found for: CHLPL  Lab Results   Component Value Date    TRIG 74 05/12/2023    TRIG 87 07/09/2022    TRIG 111 06/12/2021     Lab Results   Component Value Date    HDL 39 (L) 05/12/2023    HDL 45 07/09/2022    HDL 42 06/12/2021     Lab Results   Component Value Date    LDLCALC 83 06/12/2021    LDLCALC 63 11/05/2018    LDLCALC 89 01/22/2018     No results found for: LABVLDL      Chemistry        Component Value Date/Time     05/12/2023 0856    K 4.0 05/12/2023 0856     05/12/2023 0856    CO2 27 05/12/2023 0856    BUN 15 05/12/2023 0856    CREATININE 0.87 05/12/2023 0856        Component Value Date/Time    CALCIUM 9.40 05/12/2023 0856    ALKPHOS 58 05/12/2023 0856

## 2023-07-13 ENCOUNTER — OFFICE VISIT (OUTPATIENT)
Dept: FAMILY MEDICINE CLINIC | Age: 38
End: 2023-07-13
Payer: COMMERCIAL

## 2023-07-13 VITALS
RESPIRATION RATE: 16 BRPM | SYSTOLIC BLOOD PRESSURE: 116 MMHG | BODY MASS INDEX: 29.67 KG/M2 | WEIGHT: 185.2 LBS | DIASTOLIC BLOOD PRESSURE: 68 MMHG | HEART RATE: 92 BPM

## 2023-07-13 DIAGNOSIS — I10 ESSENTIAL HYPERTENSION: ICD-10-CM

## 2023-07-13 DIAGNOSIS — E11.9 TYPE 2 DIABETES MELLITUS WITHOUT COMPLICATION, WITHOUT LONG-TERM CURRENT USE OF INSULIN (HCC): ICD-10-CM

## 2023-07-13 DIAGNOSIS — E66.9 OBESITY, CLASS I, BMI 30-34.9: Primary | ICD-10-CM

## 2023-07-13 PROBLEM — E66.811 OBESITY, CLASS I, BMI 30-34.9: Status: RESOLVED | Noted: 2020-02-10 | Resolved: 2023-07-13

## 2023-07-13 PROCEDURE — 1036F TOBACCO NON-USER: CPT | Performed by: NURSE PRACTITIONER

## 2023-07-13 PROCEDURE — G8417 CALC BMI ABV UP PARAM F/U: HCPCS | Performed by: NURSE PRACTITIONER

## 2023-07-13 PROCEDURE — G8427 DOCREV CUR MEDS BY ELIG CLIN: HCPCS | Performed by: NURSE PRACTITIONER

## 2023-07-13 PROCEDURE — 3074F SYST BP LT 130 MM HG: CPT | Performed by: NURSE PRACTITIONER

## 2023-07-13 PROCEDURE — 3078F DIAST BP <80 MM HG: CPT | Performed by: NURSE PRACTITIONER

## 2023-07-13 PROCEDURE — 3044F HG A1C LEVEL LT 7.0%: CPT | Performed by: NURSE PRACTITIONER

## 2023-07-13 PROCEDURE — 2022F DILAT RTA XM EVC RTNOPTHY: CPT | Performed by: NURSE PRACTITIONER

## 2023-07-13 PROCEDURE — 99214 OFFICE O/P EST MOD 30 MIN: CPT | Performed by: NURSE PRACTITIONER

## 2023-07-13 RX ORDER — PHENTERMINE HYDROCHLORIDE 37.5 MG/1
37.5 CAPSULE ORAL EVERY MORNING
Qty: 30 CAPSULE | Refills: 0 | Status: SHIPPED | OUTPATIENT
Start: 2023-07-13 | End: 2023-08-12

## 2023-07-13 RX ORDER — LISINOPRIL AND HYDROCHLOROTHIAZIDE 20; 12.5 MG/1; MG/1
1 TABLET ORAL DAILY
Qty: 90 TABLET | Refills: 3 | Status: CANCELLED | OUTPATIENT
Start: 2023-07-13

## 2023-07-13 RX ORDER — HYDROCHLOROTHIAZIDE 12.5 MG/1
12.5 CAPSULE, GELATIN COATED ORAL EVERY MORNING
Qty: 30 CAPSULE | Refills: 0 | Status: SHIPPED | OUTPATIENT
Start: 2023-07-13

## 2023-07-13 ASSESSMENT — PATIENT HEALTH QUESTIONNAIRE - PHQ9
SUM OF ALL RESPONSES TO PHQ QUESTIONS 1-9: 0
SUM OF ALL RESPONSES TO PHQ QUESTIONS 1-9: 0
2. FEELING DOWN, DEPRESSED OR HOPELESS: 0
SUM OF ALL RESPONSES TO PHQ9 QUESTIONS 1 & 2: 0
SUM OF ALL RESPONSES TO PHQ QUESTIONS 1-9: 0
SUM OF ALL RESPONSES TO PHQ QUESTIONS 1-9: 0
1. LITTLE INTEREST OR PLEASURE IN DOING THINGS: 0

## 2023-07-13 ASSESSMENT — ENCOUNTER SYMPTOMS: SHORTNESS OF BREATH: 0

## 2023-08-17 ENCOUNTER — PATIENT MESSAGE (OUTPATIENT)
Dept: FAMILY MEDICINE CLINIC | Age: 38
End: 2023-08-17

## 2023-08-17 DIAGNOSIS — I10 ESSENTIAL HYPERTENSION: ICD-10-CM

## 2023-08-17 RX ORDER — HYDROCHLOROTHIAZIDE 25 MG/1
25 TABLET ORAL EVERY MORNING
Qty: 90 TABLET | Refills: 1 | Status: SHIPPED | OUTPATIENT
Start: 2023-08-17

## 2023-08-21 DIAGNOSIS — E11.9 TYPE 2 DIABETES MELLITUS WITHOUT COMPLICATION, WITHOUT LONG-TERM CURRENT USE OF INSULIN (HCC): ICD-10-CM

## 2023-08-21 RX ORDER — DULAGLUTIDE 4.5 MG/.5ML
4.5 INJECTION, SOLUTION SUBCUTANEOUS WEEKLY
Qty: 2 ML | Refills: 5 | Status: SHIPPED | OUTPATIENT
Start: 2023-08-21

## 2023-10-10 ENCOUNTER — NURSE ONLY (OUTPATIENT)
Dept: LAB | Age: 38
End: 2023-10-10

## 2023-10-14 LAB
C TRACH RRNA SPEC QL NAA+PROBE: NEGATIVE
N GONORRHOEA RRNA SPEC QL NAA+PROBE: NEGATIVE
SPEC CONTAINER SPEC: NORMAL
SPECIMEN SOURCE: NORMAL
SPECIMEN SOURCE: NORMAL
T VAGINALIS RRNA SPEC QL NAA+PROBE: NEGATIVE

## 2023-12-12 ENCOUNTER — NURSE ONLY (OUTPATIENT)
Dept: LAB | Age: 38
End: 2023-12-12

## 2023-12-12 DIAGNOSIS — E11.9 TYPE 2 DIABETES MELLITUS WITHOUT COMPLICATION, WITHOUT LONG-TERM CURRENT USE OF INSULIN (HCC): ICD-10-CM

## 2023-12-12 LAB
DEPRECATED MEAN GLUCOSE BLD GHB EST-ACNC: 120 MG/DL (ref 70–126)
HBA1C MFR BLD HPLC: 6 % (ref 4.4–6.4)

## 2024-02-12 RX ORDER — HYDROCHLOROTHIAZIDE 25 MG/1
25 TABLET ORAL EVERY MORNING
Qty: 90 TABLET | Refills: 1 | Status: SHIPPED | OUTPATIENT
Start: 2024-02-12

## 2024-02-22 DIAGNOSIS — E11.9 TYPE 2 DIABETES MELLITUS WITHOUT COMPLICATION, WITHOUT LONG-TERM CURRENT USE OF INSULIN (HCC): ICD-10-CM

## 2024-02-22 RX ORDER — DULAGLUTIDE 4.5 MG/.5ML
4.5 INJECTION, SOLUTION SUBCUTANEOUS WEEKLY
Qty: 2 ML | Refills: 5 | Status: SHIPPED | OUTPATIENT
Start: 2024-02-22

## 2024-03-06 ENCOUNTER — OFFICE VISIT (OUTPATIENT)
Dept: FAMILY MEDICINE CLINIC | Age: 39
End: 2024-03-06
Payer: COMMERCIAL

## 2024-03-06 VITALS
RESPIRATION RATE: 18 BRPM | SYSTOLIC BLOOD PRESSURE: 130 MMHG | HEART RATE: 96 BPM | BODY MASS INDEX: 33.36 KG/M2 | WEIGHT: 209.8 LBS | DIASTOLIC BLOOD PRESSURE: 88 MMHG

## 2024-03-06 DIAGNOSIS — F41.1 GAD (GENERALIZED ANXIETY DISORDER): ICD-10-CM

## 2024-03-06 DIAGNOSIS — Z00.00 WELL ADULT EXAM: Primary | ICD-10-CM

## 2024-03-06 DIAGNOSIS — E66.9 OBESITY, CLASS I, BMI 30-34.9: ICD-10-CM

## 2024-03-06 DIAGNOSIS — R60.9 WATER RETENTION: ICD-10-CM

## 2024-03-06 DIAGNOSIS — G47.09 OTHER INSOMNIA: ICD-10-CM

## 2024-03-06 DIAGNOSIS — E11.9 TYPE 2 DIABETES MELLITUS WITHOUT COMPLICATION, WITHOUT LONG-TERM CURRENT USE OF INSULIN (HCC): ICD-10-CM

## 2024-03-06 PROCEDURE — 3075F SYST BP GE 130 - 139MM HG: CPT | Performed by: NURSE PRACTITIONER

## 2024-03-06 PROCEDURE — 3079F DIAST BP 80-89 MM HG: CPT | Performed by: NURSE PRACTITIONER

## 2024-03-06 PROCEDURE — G8484 FLU IMMUNIZE NO ADMIN: HCPCS | Performed by: NURSE PRACTITIONER

## 2024-03-06 PROCEDURE — 99395 PREV VISIT EST AGE 18-39: CPT | Performed by: NURSE PRACTITIONER

## 2024-03-06 RX ORDER — FUROSEMIDE 20 MG/1
20 TABLET ORAL DAILY
Qty: 90 TABLET | Refills: 3 | Status: SHIPPED | OUTPATIENT
Start: 2024-03-06

## 2024-03-06 RX ORDER — TRAZODONE HYDROCHLORIDE 50 MG/1
50 TABLET ORAL NIGHTLY
Qty: 30 TABLET | Refills: 0 | Status: SHIPPED | OUTPATIENT
Start: 2024-03-06

## 2024-03-06 RX ORDER — POTASSIUM CHLORIDE 750 MG/1
10 TABLET, EXTENDED RELEASE ORAL DAILY
Qty: 90 TABLET | Refills: 3 | Status: SHIPPED | OUTPATIENT
Start: 2024-03-06

## 2024-03-06 SDOH — ECONOMIC STABILITY: FOOD INSECURITY: WITHIN THE PAST 12 MONTHS, YOU WORRIED THAT YOUR FOOD WOULD RUN OUT BEFORE YOU GOT MONEY TO BUY MORE.: NEVER TRUE

## 2024-03-06 SDOH — ECONOMIC STABILITY: INCOME INSECURITY: HOW HARD IS IT FOR YOU TO PAY FOR THE VERY BASICS LIKE FOOD, HOUSING, MEDICAL CARE, AND HEATING?: NOT HARD AT ALL

## 2024-03-06 SDOH — ECONOMIC STABILITY: FOOD INSECURITY: WITHIN THE PAST 12 MONTHS, THE FOOD YOU BOUGHT JUST DIDN'T LAST AND YOU DIDN'T HAVE MONEY TO GET MORE.: NEVER TRUE

## 2024-03-06 ASSESSMENT — PATIENT HEALTH QUESTIONNAIRE - PHQ9
2. FEELING DOWN, DEPRESSED OR HOPELESS: 0
SUM OF ALL RESPONSES TO PHQ QUESTIONS 1-9: 0
SUM OF ALL RESPONSES TO PHQ9 QUESTIONS 1 & 2: 0
1. LITTLE INTEREST OR PLEASURE IN DOING THINGS: 0
SUM OF ALL RESPONSES TO PHQ QUESTIONS 1-9: 0

## 2024-03-06 ASSESSMENT — ENCOUNTER SYMPTOMS: SHORTNESS OF BREATH: 0

## 2024-03-06 NOTE — PROGRESS NOTES
Subjective:      Patient ID: Nyasia Rinaldi is a 39 y.o. female.    HPI: Follow Up    Chief Complaint   Patient presents with    Weight Gain    Fatigue    Edema    Generalized Body Aches    Dry skin       Symptoms as above.  Frustrated with weight.  Gained 25# since Summer with no changes.  On Trulicity 4.5 mg    Wt Readings from Last 3 Encounters:   03/06/24 95.2 kg (209 lb 12.8 oz)   12/18/23 93.7 kg (206 lb 9.6 oz)   07/13/23 84 kg (185 lb 3.2 oz)       Daytime fatigue.  Sleep poor.  Restless poor.   Mentally doing well.     Patient Active Problem List   Diagnosis    Asthma with bronchitis    Diabetes mellitus, type 2 (HCC)    Essential hypertension       ENT - Dr. Reyes  SURG - Dr. Parkinson    Denies CP, SOB or chest tightness    On Buspar 10 mg Daily.   Failed on Cymbalta, Effexor and Pristiq.  No better.  Stressors.  Anxiety dominant.      BP stable. Prinzide 20-12.5 mg.   Microalbuminuria NEG.     BP Readings from Last 3 Encounters:   03/06/24 130/88   12/18/23 124/72   07/13/23 116/68       Labs reviewed.  DM stable.  Increase in weight.     Wt Readings from Last 3 Encounters:   03/06/24 95.2 kg (209 lb 12.8 oz)   12/18/23 93.7 kg (206 lb 9.6 oz)   07/13/23 84 kg (185 lb 3.2 oz)     Body mass index is 33.36 kg/m².    On Trulicity 4.5 mg     Lab Results   Component Value Date    LABA1C 6.0 12/12/2023    LABA1C 5.7 05/12/2023    LABA1C 6.1 11/14/2022     Lab Results   Component Value Date     12/12/2023       No longer on STATIN.     No components found for: \"CHLPL\"  Lab Results   Component Value Date    TRIG 74 05/12/2023    TRIG 87 07/09/2022    TRIG 111 06/12/2021     Lab Results   Component Value Date    HDL 39 (L) 05/12/2023    HDL 45 07/09/2022    HDL 42 06/12/2021     Lab Results   Component Value Date    LDLCALC 83 06/12/2021    LDLCALC 63 11/05/2018    LDLCALC 89 01/22/2018     No components found for: \"LABVLDL\"      Chemistry        Component Value Date/Time     05/12/2023 0856    K

## 2024-03-13 ENCOUNTER — NURSE ONLY (OUTPATIENT)
Dept: LAB | Age: 39
End: 2024-03-13

## 2024-03-13 DIAGNOSIS — Z00.00 WELL ADULT EXAM: ICD-10-CM

## 2024-03-13 LAB
25(OH)D3 SERPL-MCNC: 20 NG/ML (ref 30–100)
ALBUMIN SERPL BCG-MCNC: 4 G/DL (ref 3.5–5.1)
ALP SERPL-CCNC: 58 U/L (ref 38–126)
ALT SERPL W/O P-5'-P-CCNC: 20 U/L (ref 11–66)
ANION GAP SERPL CALC-SCNC: 10 MEQ/L (ref 8–16)
AST SERPL-CCNC: 16 U/L (ref 5–40)
BASOPHILS ABSOLUTE: 0.1 THOU/MM3 (ref 0–0.1)
BASOPHILS NFR BLD AUTO: 0.6 %
BILIRUB SERPL-MCNC: 0.3 MG/DL (ref 0.3–1.2)
BUN SERPL-MCNC: 9 MG/DL (ref 7–22)
CALCIUM SERPL-MCNC: 9 MG/DL (ref 8.5–10.5)
CHLORIDE SERPL-SCNC: 106 MEQ/L (ref 98–111)
CHOLEST SERPL-MCNC: 199 MG/DL (ref 100–199)
CO2 SERPL-SCNC: 25 MEQ/L (ref 23–33)
CREAT SERPL-MCNC: 0.8 MG/DL (ref 0.4–1.2)
CREAT UR-MCNC: 185.4 MG/DL
DEPRECATED MEAN GLUCOSE BLD GHB EST-ACNC: 132 MG/DL (ref 70–126)
DEPRECATED RDW RBC AUTO: 40.3 FL (ref 35–45)
EOSINOPHIL NFR BLD AUTO: 1.4 %
EOSINOPHILS ABSOLUTE: 0.1 THOU/MM3 (ref 0–0.4)
ERYTHROCYTE [DISTWIDTH] IN BLOOD BY AUTOMATED COUNT: 12.8 % (ref 11.5–14.5)
GFR SERPL CREATININE-BSD FRML MDRD: > 60 ML/MIN/1.73M2
GLUCOSE SERPL-MCNC: 121 MG/DL (ref 70–108)
HBA1C MFR BLD HPLC: 6.4 % (ref 4.4–6.4)
HCT VFR BLD AUTO: 44.9 % (ref 37–47)
HDLC SERPL-MCNC: 54 MG/DL
HGB BLD-MCNC: 15.1 GM/DL (ref 12–16)
IMM GRANULOCYTES # BLD AUTO: 0.05 THOU/MM3 (ref 0–0.07)
IMM GRANULOCYTES NFR BLD AUTO: 0.5 %
LDLC SERPL CALC-MCNC: 119 MG/DL
LYMPHOCYTES ABSOLUTE: 3 THOU/MM3 (ref 1–4.8)
LYMPHOCYTES NFR BLD AUTO: 32 %
MCH RBC QN AUTO: 29.4 PG (ref 26–33)
MCHC RBC AUTO-ENTMCNC: 33.6 GM/DL (ref 32.2–35.5)
MCV RBC AUTO: 87.5 FL (ref 81–99)
MICROALBUMIN UR-MCNC: 7.91 MG/DL
MICROALBUMIN/CREAT RATIO PNL UR: 43 MG/G (ref 0–30)
MONOCYTES ABSOLUTE: 0.7 THOU/MM3 (ref 0.4–1.3)
MONOCYTES NFR BLD AUTO: 7.8 %
NEUTROPHILS NFR BLD AUTO: 57.7 %
NRBC BLD AUTO-RTO: 0 /100 WBC
PLATELET # BLD AUTO: 261 THOU/MM3 (ref 130–400)
PMV BLD AUTO: 9.4 FL (ref 9.4–12.4)
POTASSIUM SERPL-SCNC: 4.1 MEQ/L (ref 3.5–5.2)
PROT SERPL-MCNC: 7.4 G/DL (ref 6.1–8)
RBC # BLD AUTO: 5.13 MILL/MM3 (ref 4.2–5.4)
SEGMENTED NEUTROPHILS ABSOLUTE COUNT: 5.4 THOU/MM3 (ref 1.8–7.7)
SODIUM SERPL-SCNC: 141 MEQ/L (ref 135–145)
T4 FREE SERPL-MCNC: 1.19 NG/DL (ref 0.93–1.68)
TRIGL SERPL-MCNC: 129 MG/DL (ref 0–199)
TSH SERPL DL<=0.005 MIU/L-ACNC: 0.85 UIU/ML (ref 0.4–4.2)
WBC # BLD AUTO: 9.4 THOU/MM3 (ref 4.8–10.8)

## 2024-05-28 ENCOUNTER — APPOINTMENT (OUTPATIENT)
Dept: GENERAL RADIOLOGY | Age: 39
End: 2024-05-28
Payer: COMMERCIAL

## 2024-05-28 ENCOUNTER — HOSPITAL ENCOUNTER (EMERGENCY)
Age: 39
Discharge: HOME | End: 2024-05-28
Payer: COMMERCIAL

## 2024-05-28 VITALS
BODY MASS INDEX: 35.03 KG/M2 | HEIGHT: 66 IN | RESPIRATION RATE: 16 BRPM | TEMPERATURE: 98.6 F | HEART RATE: 94 BPM | DIASTOLIC BLOOD PRESSURE: 98 MMHG | SYSTOLIC BLOOD PRESSURE: 142 MMHG | WEIGHT: 218 LBS | OXYGEN SATURATION: 97 %

## 2024-05-28 DIAGNOSIS — R05.1 ACUTE COUGH: Primary | ICD-10-CM

## 2024-05-28 DIAGNOSIS — J98.11 ATELECTASIS OF RIGHT LUNG: ICD-10-CM

## 2024-05-28 LAB — S PYO AG THROAT QL: NEGATIVE

## 2024-05-28 PROCEDURE — 99213 OFFICE O/P EST LOW 20 MIN: CPT

## 2024-05-28 PROCEDURE — 87651 STREP A DNA AMP PROBE: CPT

## 2024-05-28 PROCEDURE — 71046 X-RAY EXAM CHEST 2 VIEWS: CPT

## 2024-05-28 RX ORDER — PREDNISONE 20 MG/1
20 TABLET ORAL 2 TIMES DAILY
Qty: 10 TABLET | Refills: 0 | Status: SHIPPED | OUTPATIENT
Start: 2024-05-28 | End: 2024-06-02

## 2024-05-28 RX ORDER — ALBUTEROL SULFATE 90 UG/1
2 AEROSOL, METERED RESPIRATORY (INHALATION) 4 TIMES DAILY PRN
Qty: 18 G | Refills: 0 | Status: SHIPPED | OUTPATIENT
Start: 2024-05-28

## 2024-05-28 ASSESSMENT — ENCOUNTER SYMPTOMS
EYES NEGATIVE: 1
CHEST TIGHTNESS: 1
RHINORRHEA: 1
DIARRHEA: 0
NAUSEA: 0
WHEEZING: 0
VOMITING: 0
SORE THROAT: 1
COUGH: 1

## 2024-05-28 ASSESSMENT — PAIN - FUNCTIONAL ASSESSMENT: PAIN_FUNCTIONAL_ASSESSMENT: NONE - DENIES PAIN

## 2024-05-28 NOTE — DISCHARGE INSTRUCTIONS
Medications as prescribed.  Over-the-counter Mucinex DM twice a day as needed for congestion.  Increase fluid intake.  Over-the-counter Tylenol or Motrin as needed.  Follow-up with family doctor 3 to 5 days if symptoms do not improve.  Return for new or worsening symptoms.  Go to ER for any shortness of breath, chest pain, or difficulty breathing.

## 2024-05-28 NOTE — ED PROVIDER NOTES
[facility] URGENT CARE  Urgent Care Encounter       CHIEF COMPLAINT       Chief Complaint   Patient presents with    Cough    Nasal Congestion    Pharyngitis       Nurses Notes reviewed and I agree except as noted in the HPI.  HISTORY OF PRESENT ILLNESS   Nyasia Rinaldi is a 39 y.o. female who presents cough, nasal congestion, and sore throat.  States chest feels tight when she coughs. States symptoms have been going on for 5 days.  States it just started with congestion and symptoms worsened yesterday. Has not tried any over the counter medication. Denies nausea, vomiting, and diarrhea.    The history is provided by the patient. No  was used.       REVIEW OF SYSTEMS     Review of Systems   Constitutional:  Negative for fever.   HENT:  Positive for congestion, rhinorrhea and sore throat. Negative for ear pain.    Eyes: Negative.    Respiratory:  Positive for cough and chest tightness. Negative for wheezing.    Cardiovascular: Negative.    Gastrointestinal:  Negative for diarrhea, nausea and vomiting.   Genitourinary: Negative.    Musculoskeletal: Negative.    Skin: Negative.    Neurological:  Positive for headaches.   Psychiatric/Behavioral: Negative.         PAST MEDICAL HISTORY         Diagnosis Date    Asthma     Depression     Diabetes mellitus (HCC)     Hemorrhoids     Hyperlipidemia     Hypertension        SURGICALHISTORY     Patient  has a past surgical history that includes Tonsillectomy; Tympanostomy tube placement;  section (); Colonoscopy (); Hysterectomy (2017); Tympanostomy tube placement (); Colonoscopy (Left, 2019); and Hemorrhoid surgery (N/A, 2019).    CURRENT MEDICATIONS       Discharge Medication List as of 2024  6:40 PM        CONTINUE these medications which have NOT CHANGED    Details   furosemide (LASIX) 20 MG tablet Take 1 tablet by mouth daily, Disp-90 tablet, R-3Normal      potassium chloride (KLOR-CON M) 10 MEQ

## 2024-05-29 ENCOUNTER — TELEPHONE (OUTPATIENT)
Dept: FAMILY MEDICINE CLINIC | Age: 39
End: 2024-05-29

## 2024-06-12 ENCOUNTER — PATIENT MESSAGE (OUTPATIENT)
Dept: FAMILY MEDICINE CLINIC | Age: 39
End: 2024-06-12

## 2024-06-12 DIAGNOSIS — N76.0 ACUTE VAGINITIS: ICD-10-CM

## 2024-06-12 DIAGNOSIS — N39.0 ACUTE UTI: Primary | ICD-10-CM

## 2024-06-12 DIAGNOSIS — R05.1 ACUTE COUGH: ICD-10-CM

## 2024-06-12 RX ORDER — CIPROFLOXACIN 500 MG/1
500 TABLET, FILM COATED ORAL 2 TIMES DAILY
Qty: 6 TABLET | Refills: 0 | Status: SHIPPED | OUTPATIENT
Start: 2024-06-12 | End: 2024-06-15

## 2024-06-12 RX ORDER — FLUCONAZOLE 150 MG/1
150 TABLET ORAL ONCE
Qty: 2 TABLET | Refills: 0 | Status: SHIPPED | OUTPATIENT
Start: 2024-06-12 | End: 2024-06-12

## 2024-06-12 RX ORDER — BENZONATATE 100 MG/1
100-200 CAPSULE ORAL 3 TIMES DAILY PRN
Qty: 60 CAPSULE | Refills: 0 | Status: SHIPPED | OUTPATIENT
Start: 2024-06-12 | End: 2024-06-19

## 2024-06-12 NOTE — TELEPHONE ENCOUNTER
From: Nyasia Rinaldi  To: Michael Patel  Sent: 6/12/2024 9:21 AM EDT  Subject: Follow up    I went to urgent care roughly 2 weeks ago for a severe cough and I still have this cough which is now causing vomiting and headaches. They gave me a steroid which caused a uti and a yeast infection too. Is there any way to get scripts sent in for these 3 things? I see you on Monday but I’m feeling horrible and would like to get ahead of this mess. Thanks!

## 2024-06-27 ENCOUNTER — HOSPITAL ENCOUNTER (EMERGENCY)
Age: 39
Discharge: HOME | End: 2024-06-27
Payer: COMMERCIAL

## 2024-06-27 ENCOUNTER — APPOINTMENT (OUTPATIENT)
Dept: GENERAL RADIOLOGY | Age: 39
End: 2024-06-27
Payer: COMMERCIAL

## 2024-06-27 VITALS
HEART RATE: 106 BPM | BODY MASS INDEX: 35.36 KG/M2 | TEMPERATURE: 98.1 F | RESPIRATION RATE: 22 BRPM | OXYGEN SATURATION: 98 % | DIASTOLIC BLOOD PRESSURE: 97 MMHG | SYSTOLIC BLOOD PRESSURE: 127 MMHG | HEIGHT: 66 IN | WEIGHT: 220 LBS

## 2024-06-27 DIAGNOSIS — R05.8 POST-VIRAL COUGH SYNDROME: Primary | ICD-10-CM

## 2024-06-27 LAB
ALBUMIN SERPL BCG-MCNC: 4.2 G/DL (ref 3.5–5.1)
ALP SERPL-CCNC: 71 U/L (ref 38–126)
ALT SERPL W/O P-5'-P-CCNC: 68 U/L (ref 11–66)
ANION GAP SERPL CALC-SCNC: 13 MEQ/L (ref 8–16)
AST SERPL-CCNC: 67 U/L (ref 5–40)
BASOPHILS ABSOLUTE: 0.1 THOU/MM3 (ref 0–0.1)
BASOPHILS NFR BLD AUTO: 0.6 %
BILIRUB SERPL-MCNC: 0.4 MG/DL (ref 0.3–1.2)
BUN SERPL-MCNC: 9 MG/DL (ref 7–22)
CALCIUM SERPL-MCNC: 9.4 MG/DL (ref 8.5–10.5)
CHLORIDE SERPL-SCNC: 101 MEQ/L (ref 98–111)
CO2 SERPL-SCNC: 23 MEQ/L (ref 23–33)
CREAT SERPL-MCNC: 0.7 MG/DL (ref 0.4–1.2)
D DIMER PPP IA.FEU-MCNC: 304 NG/ML FEU (ref 0–500)
DEPRECATED RDW RBC AUTO: 40.1 FL (ref 35–45)
EKG ATRIAL RATE: 102 BPM
EKG ATRIAL RATE: 107 BPM
EKG P AXIS: 73 DEGREES
EKG P AXIS: 80 DEGREES
EKG P-R INTERVAL: 124 MS
EKG P-R INTERVAL: 130 MS
EKG Q-T INTERVAL: 340 MS
EKG Q-T INTERVAL: 352 MS
EKG QRS DURATION: 84 MS
EKG QRS DURATION: 86 MS
EKG QTC CALCULATION (BAZETT): 453 MS
EKG QTC CALCULATION (BAZETT): 458 MS
EKG R AXIS: 90 DEGREES
EKG R AXIS: 96 DEGREES
EKG T AXIS: 29 DEGREES
EKG T AXIS: 71 DEGREES
EKG VENTRICULAR RATE: 102 BPM
EKG VENTRICULAR RATE: 107 BPM
EOSINOPHIL NFR BLD AUTO: 1.1 %
EOSINOPHILS ABSOLUTE: 0.1 THOU/MM3 (ref 0–0.4)
ERYTHROCYTE [DISTWIDTH] IN BLOOD BY AUTOMATED COUNT: 13 % (ref 11.5–14.5)
FLUAV RNA RESP QL NAA+PROBE: NOT DETECTED
FLUBV RNA RESP QL NAA+PROBE: NOT DETECTED
GFR SERPL CREATININE-BSD FRML MDRD: > 90 ML/MIN/1.73M2
GLUCOSE SERPL-MCNC: 139 MG/DL (ref 70–108)
HCT VFR BLD AUTO: 47.2 % (ref 37–47)
HGB BLD-MCNC: 15.8 GM/DL (ref 12–16)
IMM GRANULOCYTES # BLD AUTO: 0.07 THOU/MM3 (ref 0–0.07)
IMM GRANULOCYTES NFR BLD AUTO: 0.7 %
LYMPHOCYTES ABSOLUTE: 2.4 THOU/MM3 (ref 1–4.8)
LYMPHOCYTES NFR BLD AUTO: 24.6 %
MCH RBC QN AUTO: 28.6 PG (ref 26–33)
MCHC RBC AUTO-ENTMCNC: 33.5 GM/DL (ref 32.2–35.5)
MCV RBC AUTO: 85.5 FL (ref 81–99)
MONOCYTES ABSOLUTE: 0.6 THOU/MM3 (ref 0.4–1.3)
MONOCYTES NFR BLD AUTO: 6.5 %
NEUTROPHILS ABSOLUTE: 6.5 THOU/MM3 (ref 1.8–7.7)
NEUTROPHILS NFR BLD AUTO: 66.5 %
NRBC BLD AUTO-RTO: 0 /100 WBC
NT-PROBNP SERPL IA-MCNC: < 36 PG/ML (ref 0–124)
OSMOLALITY SERPL CALC.SUM OF ELEC: 274.8 MOSMOL/KG (ref 275–300)
PLATELET # BLD AUTO: 238 THOU/MM3 (ref 130–400)
PMV BLD AUTO: 9.2 FL (ref 9.4–12.4)
POTASSIUM SERPL-SCNC: 3.9 MEQ/L (ref 3.5–5.2)
PROCALCITONIN SERPL IA-MCNC: 0.06 NG/ML (ref 0.01–0.09)
PROT SERPL-MCNC: 7.3 G/DL (ref 6.1–8)
RBC # BLD AUTO: 5.52 MILL/MM3 (ref 4.2–5.4)
SARS-COV-2 RNA RESP QL NAA+PROBE: NOT DETECTED
SODIUM SERPL-SCNC: 137 MEQ/L (ref 135–145)
TROPONIN, HIGH SENSITIVITY: < 6 NG/L (ref 0–12)
WBC # BLD AUTO: 9.8 THOU/MM3 (ref 4.8–10.8)

## 2024-06-27 PROCEDURE — 85379 FIBRIN DEGRADATION QUANT: CPT

## 2024-06-27 PROCEDURE — 83880 ASSAY OF NATRIURETIC PEPTIDE: CPT

## 2024-06-27 PROCEDURE — 84145 PROCALCITONIN (PCT): CPT

## 2024-06-27 PROCEDURE — 36415 COLL VENOUS BLD VENIPUNCTURE: CPT

## 2024-06-27 PROCEDURE — 99214 OFFICE O/P EST MOD 30 MIN: CPT

## 2024-06-27 PROCEDURE — 93005 ELECTROCARDIOGRAM TRACING: CPT

## 2024-06-27 PROCEDURE — 85025 COMPLETE CBC W/AUTO DIFF WBC: CPT

## 2024-06-27 PROCEDURE — 99215 OFFICE O/P EST HI 40 MIN: CPT

## 2024-06-27 PROCEDURE — 80053 COMPREHEN METABOLIC PANEL: CPT

## 2024-06-27 PROCEDURE — 71046 X-RAY EXAM CHEST 2 VIEWS: CPT

## 2024-06-27 PROCEDURE — 87636 SARSCOV2 & INF A&B AMP PRB: CPT

## 2024-06-27 PROCEDURE — 93010 ELECTROCARDIOGRAM REPORT: CPT | Performed by: NUCLEAR MEDICINE

## 2024-06-27 PROCEDURE — 84484 ASSAY OF TROPONIN QUANT: CPT

## 2024-06-27 PROCEDURE — 93005 ELECTROCARDIOGRAM TRACING: CPT | Performed by: NURSE PRACTITIONER

## 2024-06-27 RX ORDER — DEXTROMETHORPHAN HYDROBROMIDE AND PROMETHAZINE HYDROCHLORIDE 15; 6.25 MG/5ML; MG/5ML
5 SYRUP ORAL 3 TIMES DAILY PRN
Qty: 118 ML | Refills: 0 | Status: SHIPPED | OUTPATIENT
Start: 2024-06-27 | End: 2024-07-04

## 2024-06-27 RX ORDER — FLUTICASONE PROPIONATE 50 MCG
1 SPRAY, SUSPENSION (ML) NASAL DAILY
Qty: 16 G | Refills: 0 | Status: SHIPPED | OUTPATIENT
Start: 2024-06-27

## 2024-06-27 ASSESSMENT — PAIN - FUNCTIONAL ASSESSMENT: PAIN_FUNCTIONAL_ASSESSMENT: 0-10

## 2024-06-27 ASSESSMENT — ENCOUNTER SYMPTOMS
NAUSEA: 0
VOMITING: 0
COUGH: 1
SHORTNESS OF BREATH: 1

## 2024-06-27 ASSESSMENT — PAIN SCALES - GENERAL: PAINLEVEL_OUTOF10: 7

## 2024-06-27 ASSESSMENT — PAIN DESCRIPTION - LOCATION: LOCATION: BACK;NECK

## 2024-06-27 NOTE — ED PROVIDER NOTES
[facility] Emergency Department    CHIEF COMPLAINT       Chief Complaint   Patient presents with    Cough     Seen  for same no improvemnet telephone visit with PCP cipro rx  no improvement    Shortness of Breath     ZIMMER noted with walk from [facility]       Nurses Notes reviewed and I agree except as noted in the HPI.    HISTORY OF PRESENT ILLNESS   Nyasia Rinaldi is a 39 y.o. female who presents to the ED for evaluation of shortness of breath.  Patient reports having URI at the end of May, she was prescribed prednisone and albuterol from urgent care, failed to improve.  She was then prescribed ciprofloxacin from her primary care provider.  She notes mild improvement, but continues to have the cough.  She notes over the last 3 days she has had increasing shortness of breath.  She denies any wheezing.  She notes some mild production of mucus.  She denies any sinus congestion, rhinorrhea or fever.  She denies any significant chest pain.  She notes some mild back pain that she associates with coughing.  She denies any dysuria, or difficulty urinating.  She reports a past medical history of asthma as a child, and diabetes.  She reports no recent surgeries, no long distance travel, denies history of blood clots.  She reports being on diuretics in the past due to hands and feet swelling.  She notes that she stopped them because she did not feel they were helping.        HPI was provided by the patient.         PAST MEDICAL HISTORY     Past Medical History:   Diagnosis Date    Asthma     Depression     Diabetes mellitus (HCC)     Hemorrhoids     Hyperlipidemia     Hypertension        SURGICALHISTORY      has a past surgical history that includes Tonsillectomy; Tympanostomy tube placement;  section (); Colonoscopy (); Hysterectomy (2017); Tympanostomy tube placement (); Colonoscopy (Left, 2019); and Hemorrhoid surgery (N/A, 2019).    CURRENT MEDICATIONS       Previous Medications

## 2024-06-27 NOTE — ED PROVIDER NOTES
[facility] URGENT CARE  Urgent Care Encounter       CHIEF COMPLAINT       Chief Complaint   Patient presents with    Cough     Seen  for same no improvemnet telephone visit with PCP cipro rx  no improvement    Shortness of Breath     ZIMMER noted with walk from [facility]       Nurses Notes reviewed and I agree except as noted in the HPI.  HISTORY OF PRESENT ILLNESS   Nyasia Rinaldi is a 39 y.o. female who presents with concerns of a cough with shortness of breath.   Patient was seen on  for the same concerns, chest XR completed, diagnosed with acute cough and atelectasias of right lung, treated with albuterol inhaler and prednisone. Patient was then treated via telephone visit with PCP on , provided prescription of Cipro, Tessalon, and prednisone. Patient reports initial improvement until three days ago.    Patient states she was placed in Lasix for \"retaining water\", however discontinued on own one month ago because \"it wasn't helping with my swelling\".     HPI    REVIEW OF SYSTEMS     Review of Systems   Constitutional:  Positive for fatigue. Negative for fever.   Respiratory:  Positive for cough and shortness of breath (dyspnea on exertion).    Cardiovascular:  Positive for chest pain (\"on back side between neck and lower ribs\"). Negative for palpitations.   Gastrointestinal:  Negative for nausea and vomiting.   Neurological:  Positive for dizziness (\"occasional\"). Negative for tremors, seizures, syncope, facial asymmetry, speech difficulty, weakness, light-headedness, numbness and headaches.   All other systems reviewed and are negative.      PAST MEDICAL HISTORY         Diagnosis Date    Asthma     Depression     Diabetes mellitus (HCC)     Hemorrhoids     Hyperlipidemia     Hypertension        SURGICALHISTORY     Patient  has a past surgical history that includes Tonsillectomy; Tympanostomy tube placement;  section (); Colonoscopy (); Hysterectomy (2017); Tympanostomy

## 2024-06-27 NOTE — ED NOTES
Ana DIEHL calls report to [facility] regarding transfer Pt declines squad transport     Chasity Leblanc, RN  06/27/24 0887

## 2024-06-27 NOTE — ED TRIAGE NOTES
To room 2 c/o continued cough with worsenign SOB.  Peasron noted on arrival. CALms after sitting a few minutes.  Pt was seen in urgent care 5/28/24 and has had phone visit with PCP 6/12  Cipro steriods  albuterol inhaler and tessalon all not working per pt

## 2024-10-26 DIAGNOSIS — E11.9 TYPE 2 DIABETES MELLITUS WITHOUT COMPLICATION, WITHOUT LONG-TERM CURRENT USE OF INSULIN (HCC): ICD-10-CM

## 2024-10-28 ENCOUNTER — HOSPITAL ENCOUNTER (INPATIENT)
Age: 39
LOS: 6 days | Discharge: HOME HEALTH | DRG: 471 | End: 2024-11-04
Attending: FAMILY MEDICINE | Admitting: PHYSICIAN ASSISTANT
Payer: COMMERCIAL

## 2024-10-28 ENCOUNTER — APPOINTMENT (OUTPATIENT)
Dept: CT IMAGING | Age: 39
DRG: 471 | End: 2024-10-28
Payer: COMMERCIAL

## 2024-10-28 DIAGNOSIS — I10 PRIMARY HYPERTENSION: ICD-10-CM

## 2024-10-28 DIAGNOSIS — E11.9 TYPE 2 DIABETES MELLITUS WITHOUT COMPLICATION, WITHOUT LONG-TERM CURRENT USE OF INSULIN (HCC): ICD-10-CM

## 2024-10-28 DIAGNOSIS — F41.1 GAD (GENERALIZED ANXIETY DISORDER): ICD-10-CM

## 2024-10-28 DIAGNOSIS — M48.02 CERVICAL STENOSIS OF SPINAL CANAL: ICD-10-CM

## 2024-10-28 DIAGNOSIS — R60.9 WATER RETENTION: ICD-10-CM

## 2024-10-28 DIAGNOSIS — R53.1 LEFT-SIDED WEAKNESS: ICD-10-CM

## 2024-10-28 DIAGNOSIS — R51.9 INTRACTABLE HEADACHE, UNSPECIFIED CHRONICITY PATTERN, UNSPECIFIED HEADACHE TYPE: ICD-10-CM

## 2024-10-28 DIAGNOSIS — R53.1 ACUTE LEFT-SIDED WEAKNESS: Primary | ICD-10-CM

## 2024-10-28 LAB
ALBUMIN SERPL BCG-MCNC: 4 G/DL (ref 3.5–5.1)
ALP SERPL-CCNC: 70 U/L (ref 38–126)
ALT SERPL W/O P-5'-P-CCNC: 42 U/L (ref 11–66)
ANION GAP SERPL CALC-SCNC: 10 MEQ/L (ref 8–16)
AST SERPL-CCNC: 21 U/L (ref 5–40)
BASOPHILS ABSOLUTE: 0.1 THOU/MM3 (ref 0–0.1)
BASOPHILS NFR BLD AUTO: 0.7 %
BILIRUB SERPL-MCNC: 0.4 MG/DL (ref 0.3–1.2)
BUN SERPL-MCNC: 12 MG/DL (ref 7–22)
CALCIUM SERPL-MCNC: 9.1 MG/DL (ref 8.5–10.5)
CHLORIDE SERPL-SCNC: 102 MEQ/L (ref 98–111)
CO2 SERPL-SCNC: 26 MEQ/L (ref 23–33)
CREAT SERPL-MCNC: 0.7 MG/DL (ref 0.4–1.2)
CRP SERPL-MCNC: < 0.3 MG/DL (ref 0–1)
DEPRECATED RDW RBC AUTO: 40.1 FL (ref 35–45)
EKG ATRIAL RATE: 81 BPM
EKG P AXIS: 57 DEGREES
EKG P-R INTERVAL: 154 MS
EKG Q-T INTERVAL: 388 MS
EKG QRS DURATION: 84 MS
EKG QTC CALCULATION (BAZETT): 450 MS
EKG R AXIS: 87 DEGREES
EKG T AXIS: 46 DEGREES
EKG VENTRICULAR RATE: 81 BPM
EOSINOPHIL NFR BLD AUTO: 2.2 %
EOSINOPHILS ABSOLUTE: 0.2 THOU/MM3 (ref 0–0.4)
ERYTHROCYTE [DISTWIDTH] IN BLOOD BY AUTOMATED COUNT: 12.9 % (ref 11.5–14.5)
FLUAV RNA RESP QL NAA+PROBE: NOT DETECTED
FLUBV RNA RESP QL NAA+PROBE: NOT DETECTED
GFR SERPL CREATININE-BSD FRML MDRD: > 90 ML/MIN/1.73M2
GLUCOSE SERPL-MCNC: 122 MG/DL (ref 70–108)
HCT VFR BLD AUTO: 42.3 % (ref 37–47)
HGB BLD-MCNC: 13.9 GM/DL (ref 12–16)
IMM GRANULOCYTES # BLD AUTO: 0.03 THOU/MM3 (ref 0–0.07)
IMM GRANULOCYTES NFR BLD AUTO: 0.3 %
INR PPP: 1.03 (ref 0.85–1.13)
LYMPHOCYTES ABSOLUTE: 3 THOU/MM3 (ref 1–4.8)
LYMPHOCYTES NFR BLD AUTO: 33.3 %
MCH RBC QN AUTO: 28.7 PG (ref 26–33)
MCHC RBC AUTO-ENTMCNC: 32.9 GM/DL (ref 32.2–35.5)
MCV RBC AUTO: 87.4 FL (ref 81–99)
MONOCYTES ABSOLUTE: 0.6 THOU/MM3 (ref 0.4–1.3)
MONOCYTES NFR BLD AUTO: 6.6 %
NEUTROPHILS ABSOLUTE: 5.1 THOU/MM3 (ref 1.8–7.7)
NEUTROPHILS NFR BLD AUTO: 56.9 %
NRBC BLD AUTO-RTO: 0 /100 WBC
OSMOLALITY SERPL CALC.SUM OF ELEC: 276.7 MOSMOL/KG (ref 275–300)
PLATELET # BLD AUTO: 237 THOU/MM3 (ref 130–400)
PMV BLD AUTO: 9.5 FL (ref 9.4–12.4)
POTASSIUM SERPL-SCNC: 3.8 MEQ/L (ref 3.5–5.2)
PROT SERPL-MCNC: 6.9 G/DL (ref 6.1–8)
RBC # BLD AUTO: 4.84 MILL/MM3 (ref 4.2–5.4)
SARS-COV-2 RNA RESP QL NAA+PROBE: NOT DETECTED
SODIUM SERPL-SCNC: 138 MEQ/L (ref 135–145)
TROPONIN, HIGH SENSITIVITY: < 6 NG/L (ref 0–12)
WBC # BLD AUTO: 8.9 THOU/MM3 (ref 4.8–10.8)

## 2024-10-28 PROCEDURE — 82607 VITAMIN B-12: CPT

## 2024-10-28 PROCEDURE — 80053 COMPREHEN METABOLIC PANEL: CPT

## 2024-10-28 PROCEDURE — G0378 HOSPITAL OBSERVATION PER HR: HCPCS

## 2024-10-28 PROCEDURE — 86140 C-REACTIVE PROTEIN: CPT

## 2024-10-28 PROCEDURE — 6360000004 HC RX CONTRAST MEDICATION: Performed by: FAMILY MEDICINE

## 2024-10-28 PROCEDURE — 82550 ASSAY OF CK (CPK): CPT

## 2024-10-28 PROCEDURE — 84484 ASSAY OF TROPONIN QUANT: CPT

## 2024-10-28 PROCEDURE — 87636 SARSCOV2 & INF A&B AMP PRB: CPT

## 2024-10-28 PROCEDURE — 84443 ASSAY THYROID STIM HORMONE: CPT

## 2024-10-28 PROCEDURE — 82746 ASSAY OF FOLIC ACID SERUM: CPT

## 2024-10-28 PROCEDURE — 70496 CT ANGIOGRAPHY HEAD: CPT

## 2024-10-28 PROCEDURE — 85610 PROTHROMBIN TIME: CPT

## 2024-10-28 PROCEDURE — 70450 CT HEAD/BRAIN W/O DYE: CPT

## 2024-10-28 PROCEDURE — 99222 1ST HOSP IP/OBS MODERATE 55: CPT | Performed by: PHYSICIAN ASSISTANT

## 2024-10-28 PROCEDURE — 99285 EMERGENCY DEPT VISIT HI MDM: CPT

## 2024-10-28 PROCEDURE — 6370000000 HC RX 637 (ALT 250 FOR IP): Performed by: FAMILY MEDICINE

## 2024-10-28 PROCEDURE — 85651 RBC SED RATE NONAUTOMATED: CPT

## 2024-10-28 PROCEDURE — 70498 CT ANGIOGRAPHY NECK: CPT

## 2024-10-28 PROCEDURE — 82306 VITAMIN D 25 HYDROXY: CPT

## 2024-10-28 PROCEDURE — 93005 ELECTROCARDIOGRAM TRACING: CPT | Performed by: FAMILY MEDICINE

## 2024-10-28 PROCEDURE — 36415 COLL VENOUS BLD VENIPUNCTURE: CPT

## 2024-10-28 PROCEDURE — 86038 ANTINUCLEAR ANTIBODIES: CPT

## 2024-10-28 PROCEDURE — 85025 COMPLETE CBC W/AUTO DIFF WBC: CPT

## 2024-10-28 RX ORDER — ACETAMINOPHEN 650 MG/1
650 SUPPOSITORY RECTAL EVERY 6 HOURS PRN
Status: DISCONTINUED | OUTPATIENT
Start: 2024-10-28 | End: 2024-11-04 | Stop reason: HOSPADM

## 2024-10-28 RX ORDER — LANOLIN ALCOHOL/MO/W.PET/CERES
6 CREAM (GRAM) TOPICAL NIGHTLY PRN
Status: DISCONTINUED | OUTPATIENT
Start: 2024-10-28 | End: 2024-11-04 | Stop reason: HOSPADM

## 2024-10-28 RX ORDER — SODIUM CHLORIDE 9 MG/ML
INJECTION, SOLUTION INTRAVENOUS PRN
Status: DISCONTINUED | OUTPATIENT
Start: 2024-10-28 | End: 2024-11-04 | Stop reason: HOSPADM

## 2024-10-28 RX ORDER — ACETAMINOPHEN 325 MG/1
650 TABLET ORAL EVERY 6 HOURS PRN
Status: DISCONTINUED | OUTPATIENT
Start: 2024-10-28 | End: 2024-11-04 | Stop reason: HOSPADM

## 2024-10-28 RX ORDER — SODIUM CHLORIDE 0.9 % (FLUSH) 0.9 %
10 SYRINGE (ML) INJECTION PRN
Status: DISCONTINUED | OUTPATIENT
Start: 2024-10-28 | End: 2024-11-02

## 2024-10-28 RX ORDER — ONDANSETRON 2 MG/ML
4 INJECTION INTRAMUSCULAR; INTRAVENOUS EVERY 6 HOURS PRN
Status: DISCONTINUED | OUTPATIENT
Start: 2024-10-28 | End: 2024-11-04 | Stop reason: SDUPTHER

## 2024-10-28 RX ORDER — DULAGLUTIDE 4.5 MG/.5ML
4.5 INJECTION, SOLUTION SUBCUTANEOUS WEEKLY
Qty: 6 ML | Refills: 1 | Status: ON HOLD | OUTPATIENT
Start: 2024-10-28

## 2024-10-28 RX ORDER — IOPAMIDOL 755 MG/ML
80 INJECTION, SOLUTION INTRAVASCULAR
Status: COMPLETED | OUTPATIENT
Start: 2024-10-28 | End: 2024-10-28

## 2024-10-28 RX ORDER — ONDANSETRON 4 MG/1
4 TABLET, ORALLY DISINTEGRATING ORAL EVERY 8 HOURS PRN
Status: DISCONTINUED | OUTPATIENT
Start: 2024-10-28 | End: 2024-11-04 | Stop reason: SDUPTHER

## 2024-10-28 RX ORDER — ENOXAPARIN SODIUM 100 MG/ML
40 INJECTION SUBCUTANEOUS DAILY
Status: DISCONTINUED | OUTPATIENT
Start: 2024-10-29 | End: 2024-11-04 | Stop reason: HOSPADM

## 2024-10-28 RX ORDER — POTASSIUM CHLORIDE 1500 MG/1
40 TABLET, EXTENDED RELEASE ORAL PRN
Status: ACTIVE | OUTPATIENT
Start: 2024-10-28 | End: 2024-11-02

## 2024-10-28 RX ORDER — ASPIRIN 81 MG/1
81 TABLET, CHEWABLE ORAL ONCE
Status: COMPLETED | OUTPATIENT
Start: 2024-10-28 | End: 2024-10-28

## 2024-10-28 RX ORDER — CLOPIDOGREL BISULFATE 75 MG/1
75 TABLET ORAL ONCE
Status: COMPLETED | OUTPATIENT
Start: 2024-10-28 | End: 2024-10-28

## 2024-10-28 RX ORDER — SODIUM CHLORIDE 0.9 % (FLUSH) 0.9 %
10 SYRINGE (ML) INJECTION EVERY 12 HOURS SCHEDULED
Status: DISCONTINUED | OUTPATIENT
Start: 2024-10-28 | End: 2024-11-02

## 2024-10-28 RX ORDER — POTASSIUM CHLORIDE 7.45 MG/ML
10 INJECTION INTRAVENOUS PRN
Status: DISCONTINUED | OUTPATIENT
Start: 2024-10-28 | End: 2024-10-28 | Stop reason: RX

## 2024-10-28 RX ORDER — MAGNESIUM SULFATE IN WATER 40 MG/ML
2000 INJECTION, SOLUTION INTRAVENOUS PRN
Status: DISCONTINUED | OUTPATIENT
Start: 2024-10-28 | End: 2024-11-04 | Stop reason: HOSPADM

## 2024-10-28 RX ORDER — POLYETHYLENE GLYCOL 3350 17 G/17G
17 POWDER, FOR SOLUTION ORAL DAILY PRN
Status: DISCONTINUED | OUTPATIENT
Start: 2024-10-28 | End: 2024-11-04 | Stop reason: HOSPADM

## 2024-10-28 RX ADMIN — CLOPIDOGREL BISULFATE 75 MG: 75 TABLET ORAL at 22:32

## 2024-10-28 RX ADMIN — ASPIRIN 81 MG 81 MG: 81 TABLET ORAL at 22:32

## 2024-10-28 RX ADMIN — IOPAMIDOL 80 ML: 755 INJECTION, SOLUTION INTRAVENOUS at 21:56

## 2024-10-28 ASSESSMENT — PAIN DESCRIPTION - DESCRIPTORS
DESCRIPTORS: THROBBING;POUNDING
DESCRIPTORS: ACHING

## 2024-10-28 ASSESSMENT — PAIN - FUNCTIONAL ASSESSMENT
PAIN_FUNCTIONAL_ASSESSMENT: 0-10
PAIN_FUNCTIONAL_ASSESSMENT: NONE - DENIES PAIN

## 2024-10-28 ASSESSMENT — PAIN SCALES - GENERAL
PAINLEVEL_OUTOF10: 8
PAINLEVEL_OUTOF10: 6
PAINLEVEL_OUTOF10: 8
PAINLEVEL_OUTOF10: 5

## 2024-10-28 ASSESSMENT — PAIN DESCRIPTION - LOCATION
LOCATION: HEAD
LOCATION: HEAD

## 2024-10-28 NOTE — ED TRIAGE NOTES
Pt presents to the ED via walk-in from home with c/o left sided weakness that started a couple days ago and has progressed to rest of body feeling weak, headache started about 2 days ago intermittently from ear to ear posteriorly pt states, pt states around 1800 tonight she became dizzy and almost blacked out. Pt hypertensive other vitals stable. Pt A&O x4.

## 2024-10-29 ENCOUNTER — APPOINTMENT (OUTPATIENT)
Dept: MRI IMAGING | Age: 39
DRG: 471 | End: 2024-10-29
Payer: COMMERCIAL

## 2024-10-29 ENCOUNTER — APPOINTMENT (OUTPATIENT)
Age: 39
DRG: 471 | End: 2024-10-29
Attending: INTERNAL MEDICINE
Payer: COMMERCIAL

## 2024-10-29 ENCOUNTER — TELEPHONE (OUTPATIENT)
Dept: FAMILY MEDICINE CLINIC | Age: 39
End: 2024-10-29

## 2024-10-29 PROBLEM — J45.20 INTERMITTENT ASTHMA WITHOUT COMPLICATION: Status: ACTIVE | Noted: 2024-10-29

## 2024-10-29 LAB
25(OH)D3 SERPL-MCNC: 24 NG/ML (ref 30–100)
AMPHETAMINES UR QL SCN: NEGATIVE
BARBITURATES UR QL SCN: NEGATIVE
BENZODIAZ UR QL SCN: NEGATIVE
BILIRUB UR QL STRIP.AUTO: NEGATIVE
BZE UR QL SCN: NEGATIVE
CANNABINOIDS UR QL SCN: NEGATIVE
CHARACTER UR: CLEAR
CK SERPL-CCNC: 76 U/L (ref 30–135)
COLOR, UA: YELLOW
ECHO AO ASC DIAM: 2.7 CM
ECHO AO ASCENDING AORTA INDEX: 1.32 CM/M2
ECHO AV AREA PEAK VELOCITY: 1.8 CM2
ECHO AV AREA VTI: 1.9 CM2
ECHO AV AREA/BSA PEAK VELOCITY: 0.9 CM2/M2
ECHO AV AREA/BSA VTI: 0.9 CM2/M2
ECHO AV MEAN GRADIENT: 13 MMHG
ECHO AV MEAN VELOCITY: 1.6 M/S
ECHO AV PEAK GRADIENT: 28 MMHG
ECHO AV PEAK VELOCITY: 2.6 M/S
ECHO AV VELOCITY RATIO: 0.62
ECHO AV VTI: 47.1 CM
ECHO BSA: 2.11 M2
ECHO EST RA PRESSURE: 3 MMHG
ECHO LA AREA 2C: 16.7 CM2
ECHO LA AREA 4C: 15 CM2
ECHO LA DIAMETER INDEX: 1.52 CM/M2
ECHO LA DIAMETER: 3.1 CM
ECHO LA MAJOR AXIS: 4.6 CM
ECHO LA MINOR AXIS: 5.1 CM
ECHO LA VOL BP: 43 ML (ref 22–52)
ECHO LA VOL MOD A2C: 42 ML (ref 22–52)
ECHO LA VOL MOD A4C: 41 ML (ref 22–52)
ECHO LA VOL/BSA BIPLANE: 21 ML/M2 (ref 16–34)
ECHO LA VOLUME INDEX MOD A2C: 21 ML/M2 (ref 16–34)
ECHO LA VOLUME INDEX MOD A4C: 20 ML/M2 (ref 16–34)
ECHO LV E' LATERAL VELOCITY: 6.9 CM/S
ECHO LV E' SEPTAL VELOCITY: 7.6 CM/S
ECHO LV EDV A2C: 77 ML
ECHO LV EDV A4C: 66 ML
ECHO LV EDV INDEX A4C: 32 ML/M2
ECHO LV EDV NDEX A2C: 38 ML/M2
ECHO LV EF PHYSICIAN: 65 %
ECHO LV EJECTION FRACTION A2C: 59 %
ECHO LV EJECTION FRACTION A4C: 57 %
ECHO LV ESV A2C: 32 ML
ECHO LV ESV A4C: 28 ML
ECHO LV ESV INDEX A2C: 16 ML/M2
ECHO LV ESV INDEX A4C: 14 ML/M2
ECHO LV FRACTIONAL SHORTENING: 30 % (ref 28–44)
ECHO LV INTERNAL DIMENSION DIASTOLE INDEX: 1.96 CM/M2
ECHO LV INTERNAL DIMENSION DIASTOLIC: 4 CM (ref 3.9–5.3)
ECHO LV INTERNAL DIMENSION SYSTOLIC INDEX: 1.37 CM/M2
ECHO LV INTERNAL DIMENSION SYSTOLIC: 2.8 CM
ECHO LV ISOVOLUMETRIC RELAXATION TIME (IVRT): 49 MS
ECHO LV IVSD: 1 CM (ref 0.6–0.9)
ECHO LV MASS 2D: 136.2 G (ref 67–162)
ECHO LV MASS INDEX 2D: 66.8 G/M2 (ref 43–95)
ECHO LV POSTERIOR WALL DIASTOLIC: 1.1 CM (ref 0.6–0.9)
ECHO LV RELATIVE WALL THICKNESS RATIO: 0.55
ECHO LVOT AREA: 2.8 CM2
ECHO LVOT AV VTI INDEX: 0.67
ECHO LVOT DIAM: 1.9 CM
ECHO LVOT MEAN GRADIENT: 6 MMHG
ECHO LVOT PEAK GRADIENT: 11 MMHG
ECHO LVOT PEAK VELOCITY: 1.6 M/S
ECHO LVOT STROKE VOLUME INDEX: 43.9 ML/M2
ECHO LVOT SV: 89.5 ML
ECHO LVOT VTI: 31.6 CM
ECHO MV A VELOCITY: 0.86 M/S
ECHO MV E DECELERATION TIME (DT): 151 MS
ECHO MV E VELOCITY: 0.93 M/S
ECHO MV E/A RATIO: 1.08
ECHO MV E/E' LATERAL: 13.48
ECHO MV E/E' RATIO (AVERAGED): 12.86
ECHO MV E/E' SEPTAL: 12.24
ECHO PV MAX VELOCITY: 0.9 M/S
ECHO PV PEAK GRADIENT: 3 MMHG
ECHO RIGHT VENTRICULAR SYSTOLIC PRESSURE (RVSP): 21 MMHG
ECHO RV INTERNAL DIMENSION: 3.1 CM
ECHO RV TAPSE: 2.2 CM (ref 1.7–?)
ECHO TV REGURGITANT MAX VELOCITY: 2.13 M/S
ECHO TV REGURGITANT PEAK GRADIENT: 18 MMHG
ERYTHROCYTE [SEDIMENTATION RATE] IN BLOOD BY WESTERGREN METHOD: 11 MM/HR (ref 0–20)
FENTANYL: NEGATIVE
FOLATE SERPL-MCNC: 11.8 NG/ML (ref 4.8–24.2)
GLUCOSE UR QL STRIP.AUTO: NEGATIVE MG/DL
HGB UR QL STRIP.AUTO: NEGATIVE
KETONES UR QL STRIP.AUTO: NEGATIVE
NITRITE UR QL STRIP: NEGATIVE
OPIATES UR QL SCN: NEGATIVE
OXYCODONE: NEGATIVE
PCP UR QL SCN: NEGATIVE
PH UR STRIP.AUTO: 6.5 [PH] (ref 5–9)
PROT UR STRIP.AUTO-MCNC: NEGATIVE MG/DL
SP GR UR REFRACT.AUTO: 1.02 (ref 1–1.03)
TSH SERPL DL<=0.005 MIU/L-ACNC: 1.19 UIU/ML (ref 0.4–4.2)
UROBILINOGEN, URINE: 0.2 EU/DL (ref 0–1)
VIT B12 SERPL-MCNC: 419 PG/ML (ref 211–911)
WBC #/AREA URNS HPF: NEGATIVE /[HPF]

## 2024-10-29 PROCEDURE — 1200000000 HC SEMI PRIVATE

## 2024-10-29 PROCEDURE — 2580000003 HC RX 258: Performed by: PHYSICIAN ASSISTANT

## 2024-10-29 PROCEDURE — 93306 TTE W/DOPPLER COMPLETE: CPT | Performed by: INTERNAL MEDICINE

## 2024-10-29 PROCEDURE — G0378 HOSPITAL OBSERVATION PER HR: HCPCS

## 2024-10-29 PROCEDURE — 70553 MRI BRAIN STEM W/O & W/DYE: CPT

## 2024-10-29 PROCEDURE — 6360000002 HC RX W HCPCS

## 2024-10-29 PROCEDURE — 97530 THERAPEUTIC ACTIVITIES: CPT

## 2024-10-29 PROCEDURE — 2500000003 HC RX 250 WO HCPCS

## 2024-10-29 PROCEDURE — 93306 TTE W/DOPPLER COMPLETE: CPT

## 2024-10-29 PROCEDURE — 99223 1ST HOSP IP/OBS HIGH 75: CPT

## 2024-10-29 PROCEDURE — 81003 URINALYSIS AUTO W/O SCOPE: CPT

## 2024-10-29 PROCEDURE — 6360000002 HC RX W HCPCS: Performed by: PHYSICIAN ASSISTANT

## 2024-10-29 PROCEDURE — 97166 OT EVAL MOD COMPLEX 45 MIN: CPT

## 2024-10-29 PROCEDURE — 6370000000 HC RX 637 (ALT 250 FOR IP): Performed by: PHYSICIAN ASSISTANT

## 2024-10-29 PROCEDURE — A9579 GAD-BASE MR CONTRAST NOS,1ML: HCPCS

## 2024-10-29 PROCEDURE — 80307 DRUG TEST PRSMV CHEM ANLYZR: CPT

## 2024-10-29 PROCEDURE — 2580000003 HC RX 258

## 2024-10-29 PROCEDURE — 99232 SBSQ HOSP IP/OBS MODERATE 35: CPT | Performed by: INTERNAL MEDICINE

## 2024-10-29 PROCEDURE — 6370000000 HC RX 637 (ALT 250 FOR IP): Performed by: INTERNAL MEDICINE

## 2024-10-29 PROCEDURE — 6360000004 HC RX CONTRAST MEDICATION

## 2024-10-29 RX ORDER — ASPIRIN 81 MG/1
81 TABLET, CHEWABLE ORAL DAILY
Status: DISCONTINUED | OUTPATIENT
Start: 2024-10-29 | End: 2024-11-04 | Stop reason: HOSPADM

## 2024-10-29 RX ORDER — DIPHENHYDRAMINE HYDROCHLORIDE 50 MG/ML
25 INJECTION INTRAMUSCULAR; INTRAVENOUS ONCE
Status: COMPLETED | OUTPATIENT
Start: 2024-10-29 | End: 2024-10-29

## 2024-10-29 RX ORDER — ATORVASTATIN CALCIUM 40 MG/1
40 TABLET, FILM COATED ORAL NIGHTLY
Status: DISCONTINUED | OUTPATIENT
Start: 2024-10-29 | End: 2024-11-04 | Stop reason: HOSPADM

## 2024-10-29 RX ORDER — MAGNESIUM SULFATE HEPTAHYDRATE 40 MG/ML
2000 INJECTION, SOLUTION INTRAVENOUS ONCE
Status: COMPLETED | OUTPATIENT
Start: 2024-10-29 | End: 2024-10-29

## 2024-10-29 RX ORDER — KETOROLAC TROMETHAMINE 30 MG/ML
30 INJECTION, SOLUTION INTRAMUSCULAR; INTRAVENOUS EVERY 6 HOURS
Status: DISCONTINUED | OUTPATIENT
Start: 2024-10-29 | End: 2024-10-31

## 2024-10-29 RX ORDER — BUSPIRONE HYDROCHLORIDE 7.5 MG/1
15 TABLET ORAL DAILY
Status: DISCONTINUED | OUTPATIENT
Start: 2024-10-29 | End: 2024-11-04 | Stop reason: HOSPADM

## 2024-10-29 RX ORDER — ALBUTEROL SULFATE 90 UG/1
2 INHALANT RESPIRATORY (INHALATION) EVERY 4 HOURS PRN
Status: DISCONTINUED | OUTPATIENT
Start: 2024-10-29 | End: 2024-11-04 | Stop reason: HOSPADM

## 2024-10-29 RX ORDER — FLUTICASONE PROPIONATE 50 MCG
1 SPRAY, SUSPENSION (ML) NASAL DAILY
Status: DISCONTINUED | OUTPATIENT
Start: 2024-10-29 | End: 2024-11-04 | Stop reason: HOSPADM

## 2024-10-29 RX ORDER — 0.9 % SODIUM CHLORIDE 0.9 %
1000 INTRAVENOUS SOLUTION INTRAVENOUS ONCE
Status: COMPLETED | OUTPATIENT
Start: 2024-10-29 | End: 2024-10-29

## 2024-10-29 RX ADMIN — DIPHENHYDRAMINE HYDROCHLORIDE 25 MG: 50 INJECTION INTRAMUSCULAR; INTRAVENOUS at 18:58

## 2024-10-29 RX ADMIN — KETOROLAC TROMETHAMINE 30 MG: 30 INJECTION, SOLUTION INTRAMUSCULAR at 18:59

## 2024-10-29 RX ADMIN — FLUTICASONE PROPIONATE 1 SPRAY: 50 SPRAY, METERED NASAL at 09:50

## 2024-10-29 RX ADMIN — SODIUM CHLORIDE, PRESERVATIVE FREE 10 ML: 5 INJECTION INTRAVENOUS at 20:04

## 2024-10-29 RX ADMIN — ACETAMINOPHEN 650 MG: 325 TABLET ORAL at 08:43

## 2024-10-29 RX ADMIN — SODIUM CHLORIDE, PRESERVATIVE FREE 10 ML: 5 INJECTION INTRAVENOUS at 08:33

## 2024-10-29 RX ADMIN — ATORVASTATIN CALCIUM 40 MG: 40 TABLET, FILM COATED ORAL at 20:07

## 2024-10-29 RX ADMIN — GADOTERIDOL 20 ML: 279.3 INJECTION, SOLUTION INTRAVENOUS at 10:37

## 2024-10-29 RX ADMIN — MAGNESIUM SULFATE HEPTAHYDRATE 2000 MG: 40 INJECTION, SOLUTION INTRAVENOUS at 20:04

## 2024-10-29 RX ADMIN — SODIUM CHLORIDE, PRESERVATIVE FREE 10 ML: 5 INJECTION INTRAVENOUS at 01:00

## 2024-10-29 RX ADMIN — ENOXAPARIN SODIUM 40 MG: 100 INJECTION SUBCUTANEOUS at 08:33

## 2024-10-29 RX ADMIN — BUSPIRONE HYDROCHLORIDE 15 MG: 7.5 TABLET ORAL at 08:33

## 2024-10-29 RX ADMIN — SODIUM CHLORIDE 1000 ML: 9 INJECTION, SOLUTION INTRAVENOUS at 20:03

## 2024-10-29 RX ADMIN — ASPIRIN 81 MG 81 MG: 81 TABLET ORAL at 16:17

## 2024-10-29 ASSESSMENT — PAIN DESCRIPTION - LOCATION
LOCATION: HEAD

## 2024-10-29 ASSESSMENT — PAIN - FUNCTIONAL ASSESSMENT
PAIN_FUNCTIONAL_ASSESSMENT: PREVENTS OR INTERFERES SOME ACTIVE ACTIVITIES AND ADLS
PAIN_FUNCTIONAL_ASSESSMENT: PREVENTS OR INTERFERES SOME ACTIVE ACTIVITIES AND ADLS
PAIN_FUNCTIONAL_ASSESSMENT: ACTIVITIES ARE NOT PREVENTED

## 2024-10-29 ASSESSMENT — PAIN DESCRIPTION - ORIENTATION
ORIENTATION: MID
ORIENTATION: MID

## 2024-10-29 ASSESSMENT — PAIN DESCRIPTION - DESCRIPTORS
DESCRIPTORS: ACHING
DESCRIPTORS: ACHING

## 2024-10-29 ASSESSMENT — PAIN DESCRIPTION - ONSET: ONSET: ON-GOING

## 2024-10-29 ASSESSMENT — PAIN SCALES - GENERAL
PAINLEVEL_OUTOF10: 6
PAINLEVEL_OUTOF10: 8
PAINLEVEL_OUTOF10: 7

## 2024-10-29 ASSESSMENT — PAIN DESCRIPTION - FREQUENCY: FREQUENCY: CONTINUOUS

## 2024-10-29 ASSESSMENT — PAIN DESCRIPTION - PAIN TYPE: TYPE: ACUTE PAIN

## 2024-10-29 NOTE — PROGRESS NOTES
[facility]  PHYSICAL THERAPY MISSED TREATMENT NOTE  STRZ RENAL TELEMETRY 6K    Date: 10/29/2024  Patient Name: Nyasia Rinaldi        MRN: [id number]   : 1985  (39 y.o.)  Gender: female                REASON FOR MISSED TREATMENT:  Patient at testing and/or off unit.  Pt at MRI, will check back as able.

## 2024-10-29 NOTE — PROGRESS NOTES
Hospitalist Progress Note      Patient:  Nyasia Rinaldi    Unit/Bed:6K-20/020-A  YOB: 1985  MRN: [id number]   Acct: [id number]   PCP: Michael Patel APRN - CNP  Date of Admission: 10/28/2024    ASSESSMENT AND PLAN    Suspected TIA.  -Patient presented with Left upeer and lower extremities paraesthesias.  -CTA and CT head and neck negative for any acute pathology.  -Patient was given a dose of aspirin and Plavix.Will add high intensity statin and 81 mg aspirin.  -MRI of brain pending report.  -Patient already on regular diet,tolerating.  -Will get an ECHO .  -Neurology consulted.  -PT/OT eval    #DM2.  -On Trulicity once a week.  -Sliding scale insulin and Hypoglycemia protocol.    #Essential HTN.  -Hold home lasix for permissive HTN.    #Asthma.  -Stable on home inhalers.Continue    #Depression.  -Continue with home dose Buspar.    #Obesity.BMI 30  -Counseled about weight loss.      LDA: []CVC / []PICC / []Midline / []Huntley / []Drains / []Mediport / []None  Antibiotics: NO  Steroids: NO  Labs (still needed?): []Yes / []No  IVF (still needed?): []Yes / []No    Level of care: []Step Down / []Med-Surg  Bed Status: []Inpatient / []Observation  Telemetry: []Yes / []No  PT/OT: []Yes / []No    DVT Prophylaxis: [] Lovenox / [] Heparin / [] SCDs / [] Already on Systemic Anticoagulation / [] None     Expected discharge date:  10/30/2024  Disposition:Home    Code status: Full Code     ===================================================================    Chief Complaint:  left sided weakness      History Of Present Illness:  Nyasia Rinaldi is a 39 y.o. female who presents to [facility] with left sided weakness starting 3- 4 days ago.  Her symptoms has been constant with a worsening within the past 48 hours. She states her left kwaku specifically feels unusual and feels like it just gives way suddenly. She denies any  demonstrated.     Impression: No acute vascular abnormalities. This document has been electronically signed by: Thom Maynard MD on 10/28/2024 10:37 PM All CTs at this facility use dose modulation techniques and iterative reconstructions, and/or weight-based dosing when appropriate to reduce radiation to a low as reasonably achievable. 3D Post-processing was performed on this study.    CT Head W/O Contrast    Result Date: 10/28/2024  CT head without contrast Comparison: 01/23/2007 Findings: No intracranial mass, midline shift, hydrocephalus, or acute hemorrhage. No acute process in sinuses or mastoids. No acute bony abnormality.     Impression: No acute intracranial process This document has been electronically signed by: Thom Maynard MD on 10/28/2024 10:33 PM All CTs at this facility use dose modulation techniques and iterative reconstructions, and/or weight-based dosing when appropriate to reduce radiation to a low as reasonably achievable.      Electronically signed by Jb Meyer MD on 10/29/2024 at 1:11 PM

## 2024-10-29 NOTE — H&P
Hospitalist History & Physical         Patient: Nyasia Rinaldi 39 y.o. female      : 1985  Date of Admission: 10/28/2024  Date of Service: Pt seen/examined on 10/28/24 and Admitted to Observation with expected LOS less than two midnights due to medical therapy.         ASSESSMENT AND PLAN    Generalized and focal, left sided weakness  LLE and LUE with paresthesias on latter   CTAs and CT head no abnormalities noted  ESR, CRP normal   Neurology consulted  MRI brain pending  May need to check B12, lead levels, etc   PT/OT consult     Asthma   PRN albuterol     Depression   Buspar     Obesity   BMI > 30 noted     Data reviewed (unless otherwise discussed in assessment/plan)  EKG:  I have reviewed the EKG with the following interpretation: normal sinus rhythm   Imaging: I have reviewed CT/CTAs with the following interpretation: unremarkable   Labs: Reviewed, see chart and plan above.       =======================================================================    SUBJECTIVE    Chief Complaint:  left sided weakness     History Of Present Illness:  Nyasia Rinaldi is a 39 y.o. female who presents to [facility] with left sided weakness starting 3- 4 days ago.  Her symptoms has been constant with a worsening within the past 48 hours. She states her left kwaku specifically feels unusual and feels like it just gives way suddenly. She denies any difficulties with speech, swallowing, facial expressions/movements. She denies any recent illness, fevers, chills.   She does endorse some paresthesias of her left arm as if she fell asleep on it but to that severity of example.       Review of Systems:   Pertinent positives and negatives as noted in the HPI. Otherwise complete ROS negative.    OBJECTIVE  Physical Exam:  /83   Pulse 80   Temp 97.7 °F (36.5 °C) (Oral)   Resp 16   Ht 1.676 m (5' 6\")   Wt 95.3 kg (210 lb)   LMP 2017   SpO2 96%   BMI 33.89 kg/m²   General appearance: No apparent

## 2024-10-29 NOTE — PLAN OF CARE
Problem: Discharge Planning  Goal: Discharge to home or other facility with appropriate resources  10/29/2024 1232 by Yancy Koenig, RN  Outcome: Progressing  Flowsheets (Taken 10/29/2024 1232)  Discharge to home or other facility with appropriate resources: Identify barriers to discharge with patient and caregiver  Note: Prefer to go home with family support       Problem: Safety - Adult  Goal: Free from fall injury  10/29/2024 1232 by Yancy Koenig, RN  Outcome: Progressing  Note: No falls noted this shift. Continue falling star program. Bed alarm on, bed in low position. Call light and personal belongings in reach.  Patient uses call light appropriately.     Care plan reviewed with patient. Patient verbalizes understanding of plan of care and contributes to goal setting.

## 2024-10-29 NOTE — ED NOTES
Patient resting on cot. VS stable. Telemetry in place. Call light in reach. Patient denies needs at this time.

## 2024-10-29 NOTE — ED NOTES
Patient resting on cot. VS stable. Patient medicated per MAR. Call light in reach. Family at bedside.

## 2024-10-29 NOTE — PROGRESS NOTES
[facility]  INPATIENT OCCUPATIONAL THERAPY  STRZ RENAL TELEMETRY 6K  EVALUATION      Discharge Recommendations: Home independently  Equipment Recommendations: No        Time In: 1124  Time Out: 1153  Timed Code Treatment Minutes: 20 Minutes  Minutes: 29          Date: 10/29/2024  Patient Name: Nyasia Rinaldi,   Gender: female      MRN: [id number]  : 1985  (39 y.o.)  Referring Practitioner: Brian Narayanan PA  Diagnosis: Left-sided weakness  Additional Pertinent Hx: Nyasia Rinaldi is a 39 y.o. female who presents to [facility] with left sided weakness starting 3- 4 days ago.  Her symptoms has been constant with a worsening within the past 48 hours. She states her left kwaku specifically feels unusual and feels like it just gives way suddenly. She denies any difficulties with speech, swallowing, facial expressions/movements. She denies any recent illness, fevers, chills.   She does endorse some paresthesias of her left arm as if she fell asleep on it but to that severity of example.    Restrictions/Precautions:  Restrictions/Precautions: Fall Risk    Subjective  Chart Reviewed: Yes, Orders, Progress Notes, History and Physical  Patient assessed for rehabilitation services?: Yes  Family / Caregiver Present: Yes (jordon)    Subjective: RN approved OT session, patient pleasant and agreeable. Patient reports no changes in diet, lifestyle choices, states she drinks plenty of water and food. However patient states her level of stress has increased significantly over the past couple of months as her  had a stroke, mother and father moved in with her due to their decline in health and caring for her family. patient became very emotional and reports she feels this may have lead to her symptoms.    Pain: denied pain other than a headache     Vitals: Orthostatic Blood Pressure: SUPINE: Blood Pressure: 125/91, Heart Rate: 86  SITTING: Blood Pressure: 134/94, Heart Rate: 97  STANDING: Blood

## 2024-10-29 NOTE — CONSULTS
rash.   Neurological:      Mental Status: She is alert and oriented to person, place, and time.      Coordination: Finger-Nose-Finger Test and Heel to Shin Test normal.   Psychiatric:         Mood and Affect: Mood normal.         Speech: Speech normal.         Behavior: Behavior normal.       Neurologic Exam     Mental Status   Oriented to person, place, and time.   Follows 1 step commands.   Attention: normal. Concentration: normal.   Speech: speech is normal   Level of consciousness: alert  Normal comprehension.     Cranial Nerves     CN II   Visual fields full to confrontation.   Right visual field deficit: none  Left visual field deficit: none     CN III, IV, VI   Pupils are equal, round, and reactive to light.  Extraocular motions are normal.   Right pupil: Shape: regular. Reactivity: brisk.   Left pupil: Shape: regular. Reactivity: brisk.     CN V   Facial sensation intact.   Right facial sensation deficit: none  Left facial sensation deficit: none    CN VII   Facial expression full, symmetric.   Right facial weakness: none  Left facial weakness: none    CN VIII   CN VIII normal.   Hearing: intact    CN IX, X   CN IX normal.   Palate: symmetric    CN XI   CN XI normal.   Right trapezius strength: normal  Left trapezius strength: normal    CN XII   CN XII normal.   Tongue: not atrophic  Fasciculations: absent  Tongue deviation: none    Motor Exam   Muscle bulk: normal  Overall muscle tone: normal  Right arm tone: normal  Left arm tone: normal  Right arm pronator drift: absent  Left arm pronator drift: absent  Right leg tone: normal  Left leg tone: normal  Muscle strength 5/5 in bilateral upper and lower extremities.     Sensory Exam   Light touch normal.     Gait, Coordination, and Reflexes     Coordination   Finger to nose coordination: normal  Heel to shin coordination: normal       Labs/Imaging/Diagnostics   Labs:  CBC:  Recent Labs     10/28/24  2107   WBC 8.9   RBC 4.84   HGB 13.9   HCT 42.3   MCV 87.4  defects. No focal aneurysm or central vascular abnormality. No peripheral vascular malformations demonstrated.     Impression: No acute vascular abnormalities. This document has been electronically signed by: Thom Maynard MD on 10/28/2024 10:37 PM All CTs at this facility use dose modulation techniques and iterative reconstructions, and/or weight-based dosing when appropriate to reduce radiation to a low as reasonably achievable. 3D Post-processing was performed on this study.    CT Head W/O Contrast    Result Date: 10/28/2024  CT head without contrast Comparison: 01/23/2007 Findings: No intracranial mass, midline shift, hydrocephalus, or acute hemorrhage. No acute process in sinuses or mastoids. No acute bony abnormality.     Impression: No acute intracranial process This document has been electronically signed by: Thom Maynard MD on 10/28/2024 10:33 PM All CTs at this facility use dose modulation techniques and iterative reconstructions, and/or weight-based dosing when appropriate to reduce radiation to a low as reasonably achievable.        Assessment and Plan:        Suspect complicated migraine for cervical spine disease:    CT head negative for any acute intracranial abnormalities.  CTA head and neck negative for any hemodynamically significant stenosis.  MRI brain with and without contrast negative for acute infarct or any abnormal enhancement.  MRI cervical spine with and without contrast pending.  Patient status post Toradol 30 mg, 2 g mag, and Benadryl 25 mg.  Neurology following.  Please reach out with any further questions or concerns.    This was discussed with Dr. Evans and he is in agreement with the assessment and plan.    Electronically signed by ELIEZER Cheney on 10/29/24 at 7:10 PM EDT     I agree with evaluation and plan.    Lillie Evans MD      ambulatory

## 2024-10-29 NOTE — ED NOTES
ED to inpatient nurses report      Chief Complaint:  Chief Complaint   Patient presents with    Extremity Weakness    Headache    Dizziness    Shortness of Breath     Present to ED from: home    MOA:     LOC: alert and orientated to name, place, date  Mobility: Requires assistance * 1  Oxygen Baseline: room air    Current needs required: room air     Code Status:   Full Code    What abnormal results were found and what did you give/do to treat them? Aspirin, plavix  Any procedures or intervention occur? NA    Mental Status:  Level of Consciousness: Alert (0)    Psych Assessment:        Vitals:  Patient Vitals for the past 24 hrs:   BP Temp Temp src Pulse Resp SpO2 Height Weight   10/28/24 2232 125/83 -- -- -- 16 96 % -- --   10/28/24 2213 125/83 -- -- 80 16 96 % -- --   10/28/24 2114 (!) 137/97 -- -- 82 16 95 % -- --   10/28/24 192 (!) 139/95 97.7 °F (36.5 °C) Oral 89 18 98 % 1.676 m (5' 6\") 95.3 kg (210 lb)        LDAs:   Peripheral IV 10/28/24 Left Antecubital (Active)   Site Assessment Clean, dry & intact 10/28/24 2107   Line Status Blood return noted;Flushed;Normal saline locked 10/28/24 2107   Phlebitis Assessment No symptoms 10/28/24 2107   Infiltration Assessment 0 10/28/24 2107   Dressing Status Clean, dry & intact;New dressing applied 10/28/24 2107       Ambulatory Status:  No data recorded    Diagnosis:  DISPOSITION Admitted 10/28/2024 10:39:23 PM   Final diagnoses:   Acute left-sided weakness        Consults:  None     Pain Score:  Pain Assessment  Pain Assessment: 0-10  Pain Level: 8  Pain Location: Head  Pain Descriptors: Aching    C-SSRS:        Sepsis Screening:       [place] Fall Risk:       Swallow Screening        Preferred Language:   English      ALLERGIES     Latex    SURGICAL HISTORY       Past Surgical History:   Procedure Laterality Date     SECTION      COLONOSCOPY  2011    COLONOSCOPY Left 2019    COLONOSCOPY performed by Neal Leon MD at [facility] OR    HEMORRHOID

## 2024-10-29 NOTE — ED PROVIDER NOTES
EMERGENCY DEPARTMENT ENCOUNTER      CHIEF COMPLAINT    Chief Complaint   Patient presents with    Extremity Weakness    Headache    Dizziness    Shortness of Breath       HPI    Nyasia Rinaldi is a 39 y.o. female who presents with generalized weakness and specifically left-sided weakness, since the onset about 3 days ago. She felt the weakness was getting worse, particularly with gripping of her left hand. The duration has been constant since the onset, and progressing over the past two days. The generalized weakness was not associated with speech deficits, visual deficits, though she stated that she has blurry vision.  She endorses not feeling well but denies any cough, fever or flu like illness. No aggravating or alleviating factors.    REVIEW OF SYSTEMS    Cardiac: No chest pain or palpitations  Respiratory: No shortness of breath or new cough  General: No weight loss or night sweats   : No dysuria or hematuria  See HPI for further details.  All other systems reviewed and are negative.    PAST MEDICAL OR SURGICAL HISTORY    Past Medical History:   Diagnosis Date    Asthma     Depression     Diabetes mellitus (HCC)     Hemorrhoids     Hyperlipidemia     Hypertension      Past Surgical History:   Procedure Laterality Date     SECTION      COLONOSCOPY      COLONOSCOPY Left 2019    COLONOSCOPY performed by Neal Leon MD at [facility] OR    HEMORRHOID SURGERY N/A 2019    EXCISIONAL HEMORRHOIDECTOMY performed by Amari Parkinson MD at [facility] OR    HYSTERECTOMY (CERVIX STATUS UNKNOWN)  2017    ROBOTIC LAPAROSCOPIC WITH BS    TONSILLECTOMY      as a child    TYMPANOSTOMY TUBE PLACEMENT      as a child-multiple times    TYMPANOSTOMY TUBE PLACEMENT         CURRENT MEDICATIONS    Current Outpatient Rx   Medication Sig Dispense Refill    Dulaglutide (TRULICITY) 4.5 MG/0.5ML SOAJ Inject 4.5 mg into the skin once a week 6 mL 1    fluticasone (FLONASE) 50 MCG/ACT nasal spray 1 spray by

## 2024-10-29 NOTE — TELEPHONE ENCOUNTER
Called and spoke with the patient to let her know the office was thinking about her and to see how she was feeling.  Scheduled her TCM visit for 11/5/2024

## 2024-10-29 NOTE — RT PROTOCOL NOTE
RT Inhaler-Nebulizer Bronchodilator Protocol Note    There is a bronchodilator order in the chart from a provider indicating to follow the RT Bronchodilator Protocol and there is an “Initiate RT Inhaler-Nebulizer Bronchodilator Protocol” order as well (see protocol at bottom of note).    CXR Findings:  No results found.    The findings from the last RT Protocol Assessment were as follows:   History Pulmonary Disease: Chronic pulmonary disease (asthma, and smoked over 20 yrs)  Respiratory Pattern: Regular pattern and RR 12-20 bpm  Breath Sounds: Clear breath sounds  Cough: Strong, spontaneous, non-productive  Indication for Bronchodilator Therapy: Decreased or absent breath sounds, On home bronchodilators (prn at home)  Bronchodilator Assessment Score: 2    Aerosolized bronchodilator medication orders have been revised according to the RT Inhaler-Nebulizer Bronchodilator Protocol below.    Respiratory Therapist to perform RT Therapy Protocol Assessment initially then follow the protocol.  Repeat RT Therapy Protocol Assessment PRN for score 0-3 or on second treatment, BID, and PRN for scores above 3.    No Indications - adjust the frequency to every 6 hours PRN wheezing or bronchospasm, if no treatments needed after 48 hours then discontinue using Per Protocol order mode.     If indication present, adjust the RT bronchodilator orders based on the Bronchodilator Assessment Score as indicated below.  Use Inhaler orders unless patient has one or more of the following: on home nebulizer, not able to hold breath for 10 seconds, is not alert and oriented, cannot activate and use MDI correctly, or respiratory rate 25 breaths per minute or more, then use the equivalent nebulizer order(s) with same Frequency and PRN reasons based on the score.  If a patient is on this medication at home then do not decrease Frequency below that used at home.    0-3 - enter or revise RT bronchodilator order(s) to equivalent RT Bronchodilator

## 2024-10-30 ENCOUNTER — APPOINTMENT (OUTPATIENT)
Dept: MRI IMAGING | Age: 39
DRG: 471 | End: 2024-10-30
Payer: COMMERCIAL

## 2024-10-30 PROBLEM — M48.02 CERVICAL STENOSIS OF SPINAL CANAL: Status: ACTIVE | Noted: 2024-10-30

## 2024-10-30 PROBLEM — R53.1 ACUTE LEFT-SIDED WEAKNESS: Status: ACTIVE | Noted: 2024-10-30

## 2024-10-30 PROBLEM — R51.9 INTRACTABLE HEADACHE: Status: ACTIVE | Noted: 2024-10-30

## 2024-10-30 PROCEDURE — 6360000004 HC RX CONTRAST MEDICATION

## 2024-10-30 PROCEDURE — 6360000002 HC RX W HCPCS: Performed by: PHYSICIAN ASSISTANT

## 2024-10-30 PROCEDURE — 6360000002 HC RX W HCPCS

## 2024-10-30 PROCEDURE — 99232 SBSQ HOSP IP/OBS MODERATE 35: CPT

## 2024-10-30 PROCEDURE — 6370000000 HC RX 637 (ALT 250 FOR IP): Performed by: INTERNAL MEDICINE

## 2024-10-30 PROCEDURE — 6370000000 HC RX 637 (ALT 250 FOR IP): Performed by: PHYSICIAN ASSISTANT

## 2024-10-30 PROCEDURE — 99233 SBSQ HOSP IP/OBS HIGH 50: CPT | Performed by: STUDENT IN AN ORGANIZED HEALTH CARE EDUCATION/TRAINING PROGRAM

## 2024-10-30 PROCEDURE — 72156 MRI NECK SPINE W/O & W/DYE: CPT

## 2024-10-30 PROCEDURE — 1200000000 HC SEMI PRIVATE

## 2024-10-30 PROCEDURE — 2580000003 HC RX 258: Performed by: PHYSICIAN ASSISTANT

## 2024-10-30 PROCEDURE — A9579 GAD-BASE MR CONTRAST NOS,1ML: HCPCS

## 2024-10-30 PROCEDURE — 6370000000 HC RX 637 (ALT 250 FOR IP): Performed by: STUDENT IN AN ORGANIZED HEALTH CARE EDUCATION/TRAINING PROGRAM

## 2024-10-30 RX ORDER — AMLODIPINE BESYLATE 5 MG/1
5 TABLET ORAL DAILY
Status: DISCONTINUED | OUTPATIENT
Start: 2024-10-30 | End: 2024-10-30

## 2024-10-30 RX ORDER — CARVEDILOL 3.12 MG/1
3.12 TABLET ORAL 2 TIMES DAILY WITH MEALS
Status: DISCONTINUED | OUTPATIENT
Start: 2024-10-30 | End: 2024-10-30

## 2024-10-30 RX ORDER — LOSARTAN POTASSIUM 25 MG/1
25 TABLET ORAL DAILY
Status: DISCONTINUED | OUTPATIENT
Start: 2024-10-30 | End: 2024-11-04 | Stop reason: HOSPADM

## 2024-10-30 RX ORDER — FUROSEMIDE 20 MG/1
20 TABLET ORAL DAILY
Status: DISCONTINUED | OUTPATIENT
Start: 2024-10-30 | End: 2024-11-02

## 2024-10-30 RX ORDER — POTASSIUM CHLORIDE 1500 MG/1
10 TABLET, EXTENDED RELEASE ORAL DAILY
Status: DISCONTINUED | OUTPATIENT
Start: 2024-10-30 | End: 2024-11-04 | Stop reason: HOSPADM

## 2024-10-30 RX ADMIN — ASPIRIN 81 MG 81 MG: 81 TABLET ORAL at 10:44

## 2024-10-30 RX ADMIN — KETOROLAC TROMETHAMINE 30 MG: 30 INJECTION, SOLUTION INTRAMUSCULAR at 00:23

## 2024-10-30 RX ADMIN — FLUTICASONE PROPIONATE 1 SPRAY: 50 SPRAY, METERED NASAL at 10:45

## 2024-10-30 RX ADMIN — ACETAMINOPHEN 650 MG: 325 TABLET ORAL at 10:39

## 2024-10-30 RX ADMIN — LOSARTAN POTASSIUM 25 MG: 25 TABLET, FILM COATED ORAL at 17:53

## 2024-10-30 RX ADMIN — KETOROLAC TROMETHAMINE 30 MG: 30 INJECTION, SOLUTION INTRAMUSCULAR at 12:38

## 2024-10-30 RX ADMIN — SODIUM CHLORIDE, PRESERVATIVE FREE 10 ML: 5 INJECTION INTRAVENOUS at 10:39

## 2024-10-30 RX ADMIN — AMLODIPINE BESYLATE 5 MG: 5 TABLET ORAL at 10:41

## 2024-10-30 RX ADMIN — FUROSEMIDE 20 MG: 20 TABLET ORAL at 10:45

## 2024-10-30 RX ADMIN — POTASSIUM CHLORIDE 10 MEQ: 1500 TABLET, EXTENDED RELEASE ORAL at 10:44

## 2024-10-30 RX ADMIN — GADOTERIDOL 20 ML: 279.3 INJECTION, SOLUTION INTRAVENOUS at 14:58

## 2024-10-30 RX ADMIN — BUSPIRONE HYDROCHLORIDE 15 MG: 7.5 TABLET ORAL at 10:41

## 2024-10-30 RX ADMIN — ATORVASTATIN CALCIUM 40 MG: 40 TABLET, FILM COATED ORAL at 22:10

## 2024-10-30 RX ADMIN — ENOXAPARIN SODIUM 40 MG: 100 INJECTION SUBCUTANEOUS at 10:41

## 2024-10-30 RX ADMIN — KETOROLAC TROMETHAMINE 30 MG: 30 INJECTION, SOLUTION INTRAMUSCULAR at 17:53

## 2024-10-30 RX ADMIN — ACETAMINOPHEN 650 MG: 325 TABLET ORAL at 16:32

## 2024-10-30 RX ADMIN — SODIUM CHLORIDE, PRESERVATIVE FREE 10 ML: 5 INJECTION INTRAVENOUS at 22:10

## 2024-10-30 ASSESSMENT — PAIN SCALES - GENERAL
PAINLEVEL_OUTOF10: 5
PAINLEVEL_OUTOF10: 6
PAINLEVEL_OUTOF10: 2
PAINLEVEL_OUTOF10: 6
PAINLEVEL_OUTOF10: 0
PAINLEVEL_OUTOF10: 6
PAINLEVEL_OUTOF10: 0
PAINLEVEL_OUTOF10: 4

## 2024-10-30 ASSESSMENT — PAIN DESCRIPTION - LOCATION
LOCATION: HEAD

## 2024-10-30 ASSESSMENT — PAIN DESCRIPTION - ORIENTATION
ORIENTATION: POSTERIOR
ORIENTATION: POSTERIOR
ORIENTATION: POSTERIOR;LOWER
ORIENTATION: POSTERIOR

## 2024-10-30 ASSESSMENT — PAIN DESCRIPTION - DESCRIPTORS
DESCRIPTORS: PRESSURE
DESCRIPTORS: ACHING
DESCRIPTORS: PRESSURE;ACHING
DESCRIPTORS: PRESSURE

## 2024-10-30 ASSESSMENT — PAIN - FUNCTIONAL ASSESSMENT
PAIN_FUNCTIONAL_ASSESSMENT: PREVENTS OR INTERFERES SOME ACTIVE ACTIVITIES AND ADLS
PAIN_FUNCTIONAL_ASSESSMENT: ACTIVITIES ARE NOT PREVENTED
PAIN_FUNCTIONAL_ASSESSMENT: ACTIVITIES ARE NOT PREVENTED

## 2024-10-30 ASSESSMENT — PAIN DESCRIPTION - ONSET: ONSET: ON-GOING

## 2024-10-30 ASSESSMENT — PAIN DESCRIPTION - PAIN TYPE: TYPE: ACUTE PAIN

## 2024-10-30 ASSESSMENT — PAIN DESCRIPTION - FREQUENCY: FREQUENCY: CONTINUOUS

## 2024-10-30 NOTE — PROGRESS NOTES
Pt is alert and oriented x4. Pupils are equal and reactive to light, speech is appropriate and clear, Purposeful movement is noted in all 4 extremities. Lower extremities have bilateral equal bilateral push and pull and there is no drift. There is noticeable weakness in the left arm noted in the aspect of  strength and push and pull. Cardiovascular assessment reveals normal S1;S2 heart sounds and lung sounds that are clear throughout. Pulses are bounding radially with an amplitude of 3+ and regular rate and rhythm. There is no abdominal masses or tenderness, bowel sounds are heard in all 4 quadrants. No edema present in the lower extremities. Pt denies any numbness or tingling, just the weakness in the left arm and shoulder.

## 2024-10-30 NOTE — PROGRESS NOTES
Hospitalist Progress Note      Patient:  Nyasia Rinaldi    Unit/Bed:6K-20/020-A  YOB: 1985  MRN: [id number]   Acct: [id number]   PCP: Michael Patel APRN - CNP  Date of Admission: 10/28/2024    Assessment/Plan:    Left sided weakness with associated headache   Severe Cervical Stenosis   CVA ruled out.  MRI cervical spine with and without contrast 10/30/2024 revealed severe cervical stenosis at multiple levels.   -Consult neurosurgery for consideration of surgical intervention  -Neurology following-low suspicion for migraine at this time.  No further recommendations.  -Analgesia for headache as needed     Mild LVOT obstruction/ mild aortic stenosis- asymptomatic   TTE 10/29/2024 with normal EF 65%, mild LVOT obstruction at rest peak gradient at rest 11 mmHg due to BETTY.  Mild aortic stenosis with mean gradient 13 mmHg and FLACO area by continuity VTI 1.9 cm².  Mild systolic anterior motion of the anterior leaflet of mitral valve with a mild pressure gradient.  Mild TR.   -will consider cardiology consult if need any surgical intervention is planned per neurosurgery.  Otherwise will need to follow-up outpatient with cardiology/ PCP.    NIDDMII A1c 6.4 on 3/13/2024  At home on Trulicity once a week.  -continue humalog sliding scale and Hypoglycemia protocol in patient with goal -180.    Chronic BLE edema/ Suspected lymphedema   At home on lasix 20 mg daily with potassium supplement  - continue for now. Hold prior to surgery.     Primary HTN  Persistently hypertensive since admission.  Possibly acute in the setting of headache/acute illness.   -Start on Losartan 25 mg daily in addition to Lasix. Monitor BP.       Chronic conditions -follow up outpatient   Asthma- continue PRN albuterol   Depression- continue home Buspar   Obesity BMI 33.  on healthy lifestyle modificaitons  Hemorrhoids- noted       Code Status: Full Code    Antibiotics:  echo T1 and T2-weighted images were obtained through the brain before and after the administration of intravenous contrast. FINDINGS: The diffusion-weighted images are normal. The brain volume is normal. There are no intra-or extra-axial collections.  There is no hydrocephalus, midline shift or mass effect. On the FLAIR and T2-weighted sequences, there are a few small faint foci of signal hyperintensity in the deep and subcortical white matter. These are relatively symmetric comparing the right and left. There is no abnormal signal in the corpus callosum. The brainstem and posterior fossa appear normal. On the gradient echo T2-weighted images, there is mineralization in the medial aspects of the basal ganglia.   There is no abnormal enhancement in the brain. The major intracranial vascular flow voids are present.  The midline craniocervical junction structures are normal.  The brainstem and pituitary gland are normal.     1. Very mild amount of abnormal signal scattered in the white matter of the brain. This could be due to prior trauma, chronic headaches or early chronic small vessel ischemic changes. 2. No acute findings. **This report has been created using voice recognition software. It may contain minor errors which are inherent in voice recognition technology.** Electronically signed by Dr. Nyasia Ge    CTA NECK W WO CONTRAST    Result Date: 10/28/2024  CT angiogram of the neck/carotid arteries with contrast, Multiplanar reconstructions and MIPS Comparison: None Findings: Great vessel origins are patent at the level of the aortic arch. Bilateral subclavian arteries are patent without stenosis. Bilateral vertebral artery origins are patent without stenosis. No evidence for vertebral dissection. Left vertebral dominant. Bilateral common carotid arteries are unremarkable. Bilateral carotid bulbs demonstrate no significant plaque. No significant bilateral internal carotid artery stenosis. No significant soft

## 2024-10-30 NOTE — PLAN OF CARE
Problem: Chronic Conditions and Co-morbidities  Goal: Patient's chronic conditions and co-morbidity symptoms are monitored and maintained or improved  10/29/2024 2258 by Dorothy Champagne RN  Outcome: Progressing  Flowsheets (Taken 10/29/2024 2258)  Care Plan - Patient's Chronic Conditions and Co-Morbidity Symptoms are Monitored and Maintained or Improved:   Monitor and assess patient's chronic conditions and comorbid symptoms for stability, deterioration, or improvement   Collaborate with multidisciplinary team to address chronic and comorbid conditions and prevent exacerbation or deterioration   Update acute care plan with appropriate goals if chronic or comorbid symptoms are exacerbated and prevent overall improvement and discharge  10/29/2024 1232 by Yancy Koenig RN  Outcome: Progressing     Problem: Discharge Planning  Goal: Discharge to home or other facility with appropriate resources  10/29/2024 2258 by Dorothy Champagne RN  Outcome: Progressing  Flowsheets (Taken 10/29/2024 2258)  Discharge to home or other facility with appropriate resources:   Identify barriers to discharge with patient and caregiver   Arrange for needed discharge resources and transportation as appropriate   Identify discharge learning needs (meds, wound care, etc)  10/29/2024 1232 by Yancy Koenig RN  Outcome: Progressing  Flowsheets (Taken 10/29/2024 1232)  Discharge to home or other facility with appropriate resources: Identify barriers to discharge with patient and caregiver  Note: Prefer to go home with family support       Problem: Pain  Goal: Verbalizes/displays adequate comfort level or baseline comfort level  10/29/2024 2258 by Dorothy Champagne RN  Outcome: Progressing  Flowsheets (Taken 10/29/2024 2258)  Verbalizes/displays adequate comfort level or baseline comfort level:   Encourage patient to monitor pain and request assistance   Assess pain using appropriate pain scale   Administer analgesics based on type and severity of

## 2024-10-30 NOTE — PROGRESS NOTES
Pt laying in bed and has her  at the bedside. States her headache is still persisting despite interventions. Pt has no further needs at this time. Call light within reach and updated on plan of care

## 2024-10-30 NOTE — PROGRESS NOTES
Neurology Progress Note    Date:10/30/2024       Room:[facility]20/020-A  Patient Name:Nyasia Rinaldi     YOB: 1985     Age:39 y.o.    Requesting Physician: Jimmy Roque MD     Reason for Consult:  Evaluate for generalized and left sided weakness with some upper extremity paresthesias      Chief Complaint:   Chief Complaint   Patient presents with    Extremity Weakness    Headache    Dizziness    Shortness of Breath       Subjective     Nyasia Rinaldi is a 39 y.o. female with a history of asthma, depression, diabetes, hyperlipidemia, hypertension who presented to [facility] ED yesterday 10/28 for further evaluation of left-sided weakness, numbness, and headache.  Patient states the symptoms began roughly 2 days ago and patient notes that she developed a headache and then noticed her left side began to feel weak and numb.  CT head was obtained and negative for any acute intracranial abnormalities.  CTA head and neck obtained and negative for any hemodynamically significant stenosis.  MRI brain with and without contrast negative for acute infarct or any abnormal enhancement.  Patient states that she has a history of headaches but they are typically relieved by over-the-counter medications.  She denies ever following with an outpatient neurologist.  We will order an MRI of the cervical spine with and without contrast for further evaluation.  Patient tearful on examination stating that she just does not feel right.  She does state that she currently has a headache at the time of examination.  She denies any recent illness, difficulty speaking or understanding speech, facial drooping, or fevers.    Interval History 10/30/2024:  No acute events overnight.  Patient still admits to a left-sided numbness and weakness, most predominant in her arm.  Weak hand grasp left hand noted.  MRI cervical spine with and without contrast revealed severe spinal canal stenosis and bilateral foraminal stenosis at C6-7, moderate to

## 2024-10-30 NOTE — PROGRESS NOTES
[facility]  PHYSICAL THERAPY MISSED TREATMENT NOTE  [facility] RENAL TELEMETRY 6K    Date: 10/30/2024  Patient Name: Nyasia Rinaldi        MRN: [id number]   : 1985  (39 y.o.)  Gender: female                REASON FOR MISSED TREATMENT:  Patient at testing and/or off unit.  Pt in bed at arrival, agreeable to PT session, spent about 5 min with pt and then transport present to take pt to MRI, will check back as able.

## 2024-10-30 NOTE — PROGRESS NOTES
Pt back from MRI at this time, Pt given fresh iced water, denies any other needs at this time. Pt updated that this Student nurse is stepping off of the floor at this time. Primary RN notified.

## 2024-10-30 NOTE — PROGRESS NOTES
Pt is laying in bed resting and reports persistent headache that has not changed in character. Pt  has no complaints at this time and there is no changes from previous assessment. Call light within reach and all belongings at bedside. Pt updated on plan of care.

## 2024-10-31 LAB
DEPRECATED MEAN GLUCOSE BLD GHB EST-ACNC: 150 MG/DL (ref 70–126)
HBA1C MFR BLD HPLC: 7 % (ref 4.4–6.4)
NUCLEAR IGG SER QL IA: NORMAL

## 2024-10-31 PROCEDURE — 6370000000 HC RX 637 (ALT 250 FOR IP): Performed by: STUDENT IN AN ORGANIZED HEALTH CARE EDUCATION/TRAINING PROGRAM

## 2024-10-31 PROCEDURE — 1200000000 HC SEMI PRIVATE

## 2024-10-31 PROCEDURE — 6370000000 HC RX 637 (ALT 250 FOR IP): Performed by: PHYSICIAN ASSISTANT

## 2024-10-31 PROCEDURE — 2580000003 HC RX 258: Performed by: PHYSICIAN ASSISTANT

## 2024-10-31 PROCEDURE — 6360000002 HC RX W HCPCS

## 2024-10-31 PROCEDURE — 83036 HEMOGLOBIN GLYCOSYLATED A1C: CPT

## 2024-10-31 PROCEDURE — 36415 COLL VENOUS BLD VENIPUNCTURE: CPT

## 2024-10-31 PROCEDURE — 99231 SBSQ HOSP IP/OBS SF/LOW 25: CPT | Performed by: STUDENT IN AN ORGANIZED HEALTH CARE EDUCATION/TRAINING PROGRAM

## 2024-10-31 PROCEDURE — 6370000000 HC RX 637 (ALT 250 FOR IP): Performed by: INTERNAL MEDICINE

## 2024-10-31 PROCEDURE — 97116 GAIT TRAINING THERAPY: CPT

## 2024-10-31 PROCEDURE — 97161 PT EVAL LOW COMPLEX 20 MIN: CPT

## 2024-10-31 RX ADMIN — KETOROLAC TROMETHAMINE 30 MG: 30 INJECTION, SOLUTION INTRAMUSCULAR at 11:53

## 2024-10-31 RX ADMIN — KETOROLAC TROMETHAMINE 30 MG: 30 INJECTION, SOLUTION INTRAMUSCULAR at 00:46

## 2024-10-31 RX ADMIN — FLUTICASONE PROPIONATE 1 SPRAY: 50 SPRAY, METERED NASAL at 08:29

## 2024-10-31 RX ADMIN — ATORVASTATIN CALCIUM 40 MG: 40 TABLET, FILM COATED ORAL at 19:38

## 2024-10-31 RX ADMIN — POTASSIUM CHLORIDE 10 MEQ: 1500 TABLET, EXTENDED RELEASE ORAL at 08:28

## 2024-10-31 RX ADMIN — FUROSEMIDE 20 MG: 20 TABLET ORAL at 08:28

## 2024-10-31 RX ADMIN — BUSPIRONE HYDROCHLORIDE 15 MG: 7.5 TABLET ORAL at 08:28

## 2024-10-31 RX ADMIN — ACETAMINOPHEN 650 MG: 325 TABLET ORAL at 15:04

## 2024-10-31 RX ADMIN — SODIUM CHLORIDE, PRESERVATIVE FREE 10 ML: 5 INJECTION INTRAVENOUS at 19:39

## 2024-10-31 RX ADMIN — SODIUM CHLORIDE, PRESERVATIVE FREE 10 ML: 5 INJECTION INTRAVENOUS at 08:29

## 2024-10-31 RX ADMIN — LOSARTAN POTASSIUM 25 MG: 25 TABLET, FILM COATED ORAL at 08:29

## 2024-10-31 RX ADMIN — KETOROLAC TROMETHAMINE 30 MG: 30 INJECTION, SOLUTION INTRAMUSCULAR at 06:44

## 2024-10-31 ASSESSMENT — PAIN DESCRIPTION - DESCRIPTORS
DESCRIPTORS: ACHING
DESCRIPTORS: ACHING

## 2024-10-31 ASSESSMENT — PAIN DESCRIPTION - ORIENTATION: ORIENTATION: UPPER

## 2024-10-31 ASSESSMENT — PAIN SCALES - GENERAL
PAINLEVEL_OUTOF10: 6
PAINLEVEL_OUTOF10: 7
PAINLEVEL_OUTOF10: 0
PAINLEVEL_OUTOF10: 0
PAINLEVEL_OUTOF10: 7
PAINLEVEL_OUTOF10: 7

## 2024-10-31 ASSESSMENT — PAIN SCALES - WONG BAKER: WONGBAKER_NUMERICALRESPONSE: NO HURT

## 2024-10-31 ASSESSMENT — PAIN DESCRIPTION - LOCATION
LOCATION: HEAD
LOCATION: BACK

## 2024-10-31 NOTE — CONSULTS
Inpatient Consultation    Nyasia Rinaldi (1985)  10/31/2024    Reason for Consult:  left arm weakness    CHIEF COMPLAINT:  left arm weakness    History Obtained From:  patient    HISTORY OF PRESENT ILLNESS:                The patient is a 39 y.o. female who presents with above chief complaint.  Patient came to the ED on 10/28 due to left sided weakness, numbness, and headache. Patient is reporting main issue at this point is left arm weakness and numbness. Patient is reporting that when she ambulates she feels off balance. Denies bladder or bowel dysfunction. MRI cervical shows severe spinal canal stenosis C4-5 and C6-7.     Past Medical History:        Diagnosis Date    Asthma     Depression     Diabetes mellitus (HCC)     Hemorrhoids     Hyperlipidemia     Hypertension     Intractable headache 10/30/2024     Past Surgical History:        Procedure Laterality Date     SECTION      COLONOSCOPY  2011    COLONOSCOPY Left 2019    COLONOSCOPY performed by Neal Leon MD at [facility] OR    HEMORRHOID SURGERY N/A 2019    EXCISIONAL HEMORRHOIDECTOMY performed by Amari Parkinson MD at [facility] OR    HYSTERECTOMY (CERVIX STATUS UNKNOWN)  2017    ROBOTIC LAPAROSCOPIC WITH BS    TONSILLECTOMY      as a child    TYMPANOSTOMY TUBE PLACEMENT      as a child-multiple times    TYMPANOSTOMY TUBE PLACEMENT       Current Medications:   Current Facility-Administered Medications: furosemide (LASIX) tablet 20 mg, 20 mg, Oral, Daily  potassium chloride (KLOR-CON M) extended release tablet 10 mEq, 10 mEq, Oral, Daily  losartan (COZAAR) tablet 25 mg, 25 mg, Oral, Daily  albuterol sulfate HFA (PROVENTIL;VENTOLIN;PROAIR) 108 (90 Base) MCG/ACT inhaler 2 puff, 2 puff, Inhalation, Q4H PRN  fluticasone (FLONASE) 50 MCG/ACT nasal spray 1 spray, 1 spray, Each Nostril, Daily  busPIRone (BUSPAR) tablet 15 mg, 15 mg, Oral, Daily  atorvastatin (LIPITOR) tablet 40 mg, 40 mg, Oral, Nightly  aspirin chewable  05/12/2023 08:56 AM    LABGLOM > 90 10/28/2024 09:07 PM    LABGLOM >60 03/13/2024 09:59 AM    GLUCOSE 122 10/28/2024 09:07 PM    GLUCOSE 94 05/12/2023 08:56 AM    CALCIUM 9.1 10/28/2024 09:07 PM    BILITOT 0.4 10/28/2024 09:07 PM    ALKPHOS 70 10/28/2024 09:07 PM    AST 21 10/28/2024 09:07 PM    ALT 42 10/28/2024 09:07 PM         Radiology:   MRI Cervical  IMPRESSION:     1. Severe spinal canal stenosis and bilateral foraminal stenosis due to a  combination of degenerative changes and a slender spinal canal at the C6-7  level.  2. Moderate to severe spinal canal stenosis with mild bilateral foraminal  stenosis at the C4-5 level.  3. Spinal canal and foraminal stenosis at other levels as described.    IMPRESSION/RECOMMENDATIONS:    Assessment:   C4-5 and C6-7 degenerative disc disease with severe central canal stenosis  Left  weakness    Plan:  Discussed with Dr Pacheco. Plan is for C4-5 and C6-7 ACDF tomorrow around 2:00 PM. NPO midnight.     Stuart Starks PA-C

## 2024-10-31 NOTE — PROGRESS NOTES
[facility]  INPATIENT PHYSICAL THERAPY  EVALUATION  STR[facility] RENAL TELEMETRY 6K - 6K-20/020-A    Discharge Recommendations: Home independently  Equipment Recommendations: No             Time In: 0856  Time Out: 0914  Timed Code Treatment Minutes: 10 Minutes  Minutes: 18          Date: 10/31/2024  Patient Name: Nyasia Rinaldi,  Gender:  female        MRN: [id number]  : 1985  (39 y.o.)      Referring Practitioner: Brian Narayanan PA  Diagnosis: Left-sided weakness  Additional Pertinent Hx: Per EMR \"Nyasia Rinaldi is a 39 y.o. female who presents to [facility] with left sided weakness starting 3- 4 days ago.  Her symptoms has been constant with a worsening within the past 48 hours. She states her left kwaku specifically feels unusual and feels like it just gives way suddenly. She denies any difficulties with speech, swallowing, facial expressions/movements. She denies any recent illness, fevers, chills.   She does endorse some paresthesias of her left arm as if she fell asleep on it but to that severity of example.   \"     Restrictions/Precautions:  Restrictions/Precautions: General Precautions    Subjective:  Chart Reviewed: Yes  Patient assessed for rehabilitation services?: Yes  Family / Caregiver Present: Yes  Subjective: OK to see pt per nursing. Pt in bed when PT arrived, agreeable to PT session, pleasant and cooperative. Pt planning surgical intervention for ACDF Friday. Pt reports \"I just want to start feeling better.\"  present and supportive as well.    General:  Overall Orientation Status: Within Normal Limits  Orientation Level: Oriented X4  Vision: Within Functional Limits  Hearing: Within functional limits       Pain: no pain at this time, L UE feels heavy/tired    Vitals: Vitals not assessed per clinical judgement, see nursing flowsheet    Social/Functional History:    Lives With: Spouse, Son, Parent (15 years old; mother and father there)  Type of Home: House  Home  functional history, completion of standardized testing, formal and informal observation of tasks, assessment of data and development of plan of care and goals.  Treatment time included skilled education and facilitation of tasks to increase safety and independence with functional mobility for improved independence and quality of life.    Assessment:  Body Structures, Functions, Activity Limitations Requiring Skilled Therapeutic Intervention: Increased pain  Assessment: Nyasia Rinaldi is a 39 y.o. female who presents with the deficits stated previously. Pt planning surgical intervention 11/1 for ACDF. Pt cont to require skilled PT services to assess functional mobility following surgical intervention.   Therapy Prognosis: Good    Requires PT Follow-Up: Yes    Patient Education:      .    Patient Education  Education Given To: Patient  Education Provided: Role of Therapy, Plan of Care  Education Method: Verbal  Education Outcome: Verbalized understanding, Demonstrated understanding       Plan:  Current Treatment Recommendations: Strengthening, Balance training, Gait training, Stair training, Functional mobility training, Transfer training, Neuromuscular re-education, Endurance training, Equipment evaluation, education, & procurement, Patient/Caregiver education & training, Safety education & training, Therapeutic activities, Home exercise program  General Plan:  (1-2x O)    Goals:  Patient Goals : return home  Short Term Goals  Time Frame for Short Term Goals: by discharge  Short Term Goal 1: Pt will demo IND with gait tasks for community distances to return home safely  Short Term Goal 2: Pt will demo IND with stair negotiation with rail to return home safely.  Long Term Goals  Time Frame for Long Term Goals : NA due to short ELOS    Following session, patient left in safe position with all fall risk precautions in place.

## 2024-10-31 NOTE — CARE COORDINATION
Case Management Assessment Initial Evaluation    Date/Time of Evaluation: 10/31/2024 2:39 PM  Assessment Completed by: Geovanny Briseno RN    If patient is discharged prior to next notation, then this note serves as note for discharge by case management.    Patient Name: Nyasia Rinaldi                   YOB: 1985  Diagnosis: Left-sided weakness [R53.1]  Acute left-sided weakness [R53.1]                   Date / Time: 10/28/2024  7:16 PM  Location: [address] [place] [address]     Patient Admission Status: Inpatient   Readmission Risk Low 0-14, Mod 15-19), High > 20: Readmission Risk Score: 4.3    Current PCP: Michael Patel, [facility]  Health Care Decision Makers:   Primary Decision Maker: Alpesh Rinaldi - Spouse - [phone]    Secondary Decision Maker: Jan Rosado - Parent - [phone]    Secondary Decision Maker: Ana Maria Gomez - Parent - [phone]    Additional Case Management Notes: Ortho planning ACDF tomorrow.     Procedures: none    Imaging: 10/30 MRI cerv spine: C4-C5, there is moderate to severe spinal canal stenosis.  6-C7, there is severe spinal canal stenosis. There is severe stenosis of theboth neural foramen    Patient Goals/Plan/Treatment Preferences: Met with Brianne, she plans to return home at discharge with her , son, and her parents. She does not use DME. States she is amb without difficulty. Denies all needs at this time.        10/31/24 0157   Service Assessment   Patient Orientation Alert and Oriented   Cognition Alert   History Provided By Patient   Primary Caregiver Self   Support Systems Spouse/Significant Other;Children;Parent   Patient's Healthcare Decision Maker is: Legal Next of Kin   PCP Verified by CM Yes   Prior Functional Level Independent in ADLs/IADLs   Current Functional Level Independent in ADLs/IADLs   Can patient return to prior living arrangement Yes   Ability to make needs known: Good   Family able to assist with home care needs: Yes   Would you like for me to  discuss the discharge plan with any other family members/significant others, and if so, who? No   Financial Resources Other (Comment)  (commercial)   Community Resources None   Discharge Planning   Type of Residence House   Living Arrangements Spouse/Significant Other;Children;Parent   Current Services Prior To Admission None   Potential Assistance Needed N/A   DME Ordered? No   Potential Assistance Purchasing Medications No   Type of Home Care Services None   Condition of Participation: Discharge Planning   The Plan for Transition of Care is related to the following treatment goals: surgery tomorrow

## 2024-10-31 NOTE — PLAN OF CARE
Problem: Chronic Conditions and Co-morbidities  Goal: Patient's chronic conditions and co-morbidity symptoms are monitored and maintained or improved  10/31/2024 0932 by Neli Feliz RN  Outcome: Progressing  Flowsheets (Taken 10/31/2024 0827)  Care Plan - Patient's Chronic Conditions and Co-Morbidity Symptoms are Monitored and Maintained or Improved: Monitor and assess patient's chronic conditions and comorbid symptoms for stability, deterioration, or improvement  10/31/2024 0018 by Brie Canada RN  Outcome: Progressing     Problem: Discharge Planning  Goal: Discharge to home or other facility with appropriate resources  10/31/2024 0932 by Neli Feliz RN  Outcome: Progressing  Flowsheets (Taken 10/31/2024 0827)  Discharge to home or other facility with appropriate resources: Identify barriers to discharge with patient and caregiver  10/31/2024 0018 by Brie Canada RN  Outcome: Progressing     Problem: Pain  Goal: Verbalizes/displays adequate comfort level or baseline comfort level  10/31/2024 0932 by Neli Feliz RN  Outcome: Progressing  Flowsheets (Taken 10/29/2024 2258 by Dorothy Champagne RN)  Verbalizes/displays adequate comfort level or baseline comfort level:   Encourage patient to monitor pain and request assistance   Assess pain using appropriate pain scale   Administer analgesics based on type and severity of pain and evaluate response   Implement non-pharmacological measures as appropriate and evaluate response  10/31/2024 0018 by Brie Canada RN  Outcome: Progressing     Problem: Safety - Adult  Goal: Free from fall injury  10/31/2024 0932 by Neli Feliz RN  Outcome: Progressing  Flowsheets (Taken 10/29/2024 2258 by Dorothy Champagne RN)  Free From Fall Injury: Instruct family/caregiver on patient safety  10/31/2024 0018 by Brie Canada RN  Outcome: Progressing   Patient aware and updated on plan of care

## 2024-11-01 ENCOUNTER — APPOINTMENT (OUTPATIENT)
Dept: GENERAL RADIOLOGY | Age: 39
DRG: 471 | End: 2024-11-01
Payer: COMMERCIAL

## 2024-11-01 ENCOUNTER — ANESTHESIA EVENT (OUTPATIENT)
Dept: OPERATING ROOM | Age: 39
End: 2024-11-01
Payer: COMMERCIAL

## 2024-11-01 ENCOUNTER — ANESTHESIA (OUTPATIENT)
Dept: OPERATING ROOM | Age: 39
End: 2024-11-01
Payer: COMMERCIAL

## 2024-11-01 LAB
ALBUMIN SERPL BCG-MCNC: 3.7 G/DL (ref 3.5–5.1)
ALP SERPL-CCNC: 67 U/L (ref 38–126)
ALT SERPL W/O P-5'-P-CCNC: 37 U/L (ref 11–66)
ANION GAP SERPL CALC-SCNC: 10 MEQ/L (ref 8–16)
AST SERPL-CCNC: 18 U/L (ref 5–40)
BILIRUB CONJ SERPL-MCNC: < 0.1 MG/DL (ref 0.1–13.8)
BILIRUB SERPL-MCNC: 0.2 MG/DL (ref 0.3–1.2)
BUN SERPL-MCNC: 9 MG/DL (ref 7–22)
CA-I BLD ISE-SCNC: 1.18 MMOL/L (ref 1.12–1.32)
CALCIUM SERPL-MCNC: 8.9 MG/DL (ref 8.5–10.5)
CHLORIDE SERPL-SCNC: 104 MEQ/L (ref 98–111)
CO2 SERPL-SCNC: 27 MEQ/L (ref 23–33)
CREAT SERPL-MCNC: 0.7 MG/DL (ref 0.4–1.2)
DEPRECATED RDW RBC AUTO: 38.1 FL (ref 35–45)
ERYTHROCYTE [DISTWIDTH] IN BLOOD BY AUTOMATED COUNT: 12.6 % (ref 11.5–14.5)
GFR SERPL CREATININE-BSD FRML MDRD: > 90 ML/MIN/1.73M2
GLUCOSE SERPL-MCNC: 125 MG/DL (ref 70–108)
HCT VFR BLD AUTO: 40.7 % (ref 37–47)
HGB BLD-MCNC: 13.9 GM/DL (ref 12–16)
MAGNESIUM SERPL-MCNC: 1.9 MG/DL (ref 1.6–2.4)
MCH RBC QN AUTO: 29 PG (ref 26–33)
MCHC RBC AUTO-ENTMCNC: 34.2 GM/DL (ref 32.2–35.5)
MCV RBC AUTO: 84.8 FL (ref 81–99)
PLATELET # BLD AUTO: 229 THOU/MM3 (ref 130–400)
PMV BLD AUTO: 9.5 FL (ref 9.4–12.4)
POTASSIUM SERPL-SCNC: 4.3 MEQ/L (ref 3.5–5.2)
PROT SERPL-MCNC: 6.8 G/DL (ref 6.1–8)
RBC # BLD AUTO: 4.8 MILL/MM3 (ref 4.2–5.4)
SODIUM SERPL-SCNC: 141 MEQ/L (ref 135–145)
WBC # BLD AUTO: 7.9 THOU/MM3 (ref 4.8–10.8)

## 2024-11-01 PROCEDURE — 6370000000 HC RX 637 (ALT 250 FOR IP): Performed by: PHYSICIAN ASSISTANT

## 2024-11-01 PROCEDURE — 99232 SBSQ HOSP IP/OBS MODERATE 35: CPT | Performed by: STUDENT IN AN ORGANIZED HEALTH CARE EDUCATION/TRAINING PROGRAM

## 2024-11-01 PROCEDURE — C1889 IMPLANT/INSERT DEVICE, NOC: HCPCS | Performed by: ORTHOPAEDIC SURGERY

## 2024-11-01 PROCEDURE — L0120 CERV FLEX N/ADJ FOAM PRE OTS: HCPCS | Performed by: ORTHOPAEDIC SURGERY

## 2024-11-01 PROCEDURE — 6360000002 HC RX W HCPCS: Performed by: PHYSICIAN ASSISTANT

## 2024-11-01 PROCEDURE — 6360000002 HC RX W HCPCS: Performed by: NURSE ANESTHETIST, CERTIFIED REGISTERED

## 2024-11-01 PROCEDURE — 85027 COMPLETE CBC AUTOMATED: CPT

## 2024-11-01 PROCEDURE — 36415 COLL VENOUS BLD VENIPUNCTURE: CPT

## 2024-11-01 PROCEDURE — 7100000000 HC PACU RECOVERY - FIRST 15 MIN: Performed by: ORTHOPAEDIC SURGERY

## 2024-11-01 PROCEDURE — 2720000010 HC SURG SUPPLY STERILE: Performed by: ORTHOPAEDIC SURGERY

## 2024-11-01 PROCEDURE — 2709999900 HC NON-CHARGEABLE SUPPLY: Performed by: ORTHOPAEDIC SURGERY

## 2024-11-01 PROCEDURE — 6360000002 HC RX W HCPCS

## 2024-11-01 PROCEDURE — C1713 ANCHOR/SCREW BN/BN,TIS/BN: HCPCS | Performed by: ORTHOPAEDIC SURGERY

## 2024-11-01 PROCEDURE — 0RG20A0 FUSION OF 2 OR MORE CERVICAL VERTEBRAL JOINTS WITH INTERBODY FUSION DEVICE, ANTERIOR APPROACH, ANTERIOR COLUMN, OPEN APPROACH: ICD-10-PCS | Performed by: ORTHOPAEDIC SURGERY

## 2024-11-01 PROCEDURE — 83735 ASSAY OF MAGNESIUM: CPT

## 2024-11-01 PROCEDURE — 0RT30ZZ RESECTION OF CERVICAL VERTEBRAL DISC, OPEN APPROACH: ICD-10-PCS | Performed by: ORTHOPAEDIC SURGERY

## 2024-11-01 PROCEDURE — 72020 X-RAY EXAM OF SPINE 1 VIEW: CPT

## 2024-11-01 PROCEDURE — 80053 COMPREHEN METABOLIC PANEL: CPT

## 2024-11-01 PROCEDURE — 2580000003 HC RX 258: Performed by: PHYSICIAN ASSISTANT

## 2024-11-01 PROCEDURE — 2500000003 HC RX 250 WO HCPCS: Performed by: NURSE ANESTHETIST, CERTIFIED REGISTERED

## 2024-11-01 PROCEDURE — 1200000000 HC SEMI PRIVATE

## 2024-11-01 PROCEDURE — 7100000001 HC PACU RECOVERY - ADDTL 15 MIN: Performed by: ORTHOPAEDIC SURGERY

## 2024-11-01 PROCEDURE — 3600000004 HC SURGERY LEVEL 4 BASE: Performed by: ORTHOPAEDIC SURGERY

## 2024-11-01 PROCEDURE — 2580000003 HC RX 258: Performed by: STUDENT IN AN ORGANIZED HEALTH CARE EDUCATION/TRAINING PROGRAM

## 2024-11-01 PROCEDURE — 6360000002 HC RX W HCPCS: Performed by: ANESTHESIOLOGY

## 2024-11-01 PROCEDURE — 6370000000 HC RX 637 (ALT 250 FOR IP): Performed by: STUDENT IN AN ORGANIZED HEALTH CARE EDUCATION/TRAINING PROGRAM

## 2024-11-01 PROCEDURE — 01N10ZZ RELEASE CERVICAL NERVE, OPEN APPROACH: ICD-10-PCS | Performed by: ORTHOPAEDIC SURGERY

## 2024-11-01 PROCEDURE — 82248 BILIRUBIN DIRECT: CPT

## 2024-11-01 PROCEDURE — 3700000000 HC ANESTHESIA ATTENDED CARE: Performed by: ORTHOPAEDIC SURGERY

## 2024-11-01 PROCEDURE — 3600000014 HC SURGERY LEVEL 4 ADDTL 15MIN: Performed by: ORTHOPAEDIC SURGERY

## 2024-11-01 PROCEDURE — 3700000001 HC ADD 15 MINUTES (ANESTHESIA): Performed by: ORTHOPAEDIC SURGERY

## 2024-11-01 PROCEDURE — 6370000000 HC RX 637 (ALT 250 FOR IP): Performed by: INTERNAL MEDICINE

## 2024-11-01 PROCEDURE — 82330 ASSAY OF CALCIUM: CPT

## 2024-11-01 PROCEDURE — 1200000003 HC TELEMETRY R&B

## 2024-11-01 DEVICE — SCREW SPNL L14MM DIA3.4MM SELF DRL ANTR CERV UNISON-C: Type: IMPLANTABLE DEVICE | Site: ANTERIOR CERVICAL | Status: FUNCTIONAL

## 2024-11-01 DEVICE — IMPLANTABLE DEVICE: Type: IMPLANTABLE DEVICE | Site: ANTERIOR CERVICAL | Status: FUNCTIONAL

## 2024-11-01 DEVICE — BIOMAX BONE GRAFT PUTTY, 2.5CC
Type: IMPLANTABLE DEVICE | Site: ANTERIOR CERVICAL | Status: FUNCTIONAL
Brand: BIOMAX

## 2024-11-01 DEVICE — SCREW SPNL SD 3.4X12 MM FOR ANTR CERV FIX SYS UNISON -C: Type: IMPLANTABLE DEVICE | Site: ANTERIOR CERVICAL | Status: FUNCTIONAL

## 2024-11-01 RX ORDER — MORPHINE SULFATE 2 MG/ML
2 INJECTION, SOLUTION INTRAMUSCULAR; INTRAVENOUS
Status: DISCONTINUED | OUTPATIENT
Start: 2024-11-01 | End: 2024-11-04 | Stop reason: HOSPADM

## 2024-11-01 RX ORDER — MORPHINE SULFATE 2 MG/ML
2 INJECTION, SOLUTION INTRAMUSCULAR; INTRAVENOUS EVERY 5 MIN PRN
Status: DISCONTINUED | OUTPATIENT
Start: 2024-11-01 | End: 2024-11-01 | Stop reason: ALTCHOICE

## 2024-11-01 RX ORDER — FENTANYL CITRATE 50 UG/ML
50 INJECTION, SOLUTION INTRAMUSCULAR; INTRAVENOUS EVERY 5 MIN PRN
Status: DISCONTINUED | OUTPATIENT
Start: 2024-11-01 | End: 2024-11-01 | Stop reason: HOSPADM

## 2024-11-01 RX ORDER — NALOXONE HYDROCHLORIDE 0.4 MG/ML
INJECTION, SOLUTION INTRAMUSCULAR; INTRAVENOUS; SUBCUTANEOUS PRN
Status: DISCONTINUED | OUTPATIENT
Start: 2024-11-01 | End: 2024-11-01 | Stop reason: HOSPADM

## 2024-11-01 RX ORDER — HYDROCODONE BITARTRATE AND ACETAMINOPHEN 5; 325 MG/1; MG/1
1 TABLET ORAL EVERY 6 HOURS PRN
Status: DISCONTINUED | OUTPATIENT
Start: 2024-11-01 | End: 2024-11-02

## 2024-11-01 RX ORDER — ONDANSETRON 2 MG/ML
INJECTION INTRAMUSCULAR; INTRAVENOUS
Status: DISCONTINUED | OUTPATIENT
Start: 2024-11-01 | End: 2024-11-01 | Stop reason: SDUPTHER

## 2024-11-01 RX ORDER — DOCUSATE SODIUM 100 MG/1
100 CAPSULE, LIQUID FILLED ORAL DAILY
Status: DISCONTINUED | OUTPATIENT
Start: 2024-11-01 | End: 2024-11-04 | Stop reason: HOSPADM

## 2024-11-01 RX ORDER — ROCURONIUM BROMIDE 10 MG/ML
INJECTION, SOLUTION INTRAVENOUS
Status: DISCONTINUED | OUTPATIENT
Start: 2024-11-01 | End: 2024-11-01 | Stop reason: SDUPTHER

## 2024-11-01 RX ORDER — LIDOCAINE HCL/PF 100 MG/5ML
SYRINGE (ML) INJECTION
Status: DISCONTINUED | OUTPATIENT
Start: 2024-11-01 | End: 2024-11-01 | Stop reason: SDUPTHER

## 2024-11-01 RX ORDER — MORPHINE SULFATE 4 MG/ML
4 INJECTION, SOLUTION INTRAMUSCULAR; INTRAVENOUS
Status: DISCONTINUED | OUTPATIENT
Start: 2024-11-01 | End: 2024-11-04 | Stop reason: HOSPADM

## 2024-11-01 RX ORDER — DEXAMETHASONE SODIUM PHOSPHATE 10 MG/ML
INJECTION, EMULSION INTRAMUSCULAR; INTRAVENOUS
Status: DISCONTINUED | OUTPATIENT
Start: 2024-11-01 | End: 2024-11-01 | Stop reason: SDUPTHER

## 2024-11-01 RX ORDER — HYDROMORPHONE HYDROCHLORIDE 2 MG/ML
INJECTION, SOLUTION INTRAMUSCULAR; INTRAVENOUS; SUBCUTANEOUS
Status: DISCONTINUED | OUTPATIENT
Start: 2024-11-01 | End: 2024-11-01 | Stop reason: SDUPTHER

## 2024-11-01 RX ORDER — FENTANYL CITRATE 50 UG/ML
50 INJECTION, SOLUTION INTRAMUSCULAR; INTRAVENOUS EVERY 5 MIN PRN
Status: DISCONTINUED | OUTPATIENT
Start: 2024-11-01 | End: 2024-11-01 | Stop reason: ALTCHOICE

## 2024-11-01 RX ORDER — SODIUM CHLORIDE 0.9 % (FLUSH) 0.9 %
5-40 SYRINGE (ML) INJECTION PRN
Status: DISCONTINUED | OUTPATIENT
Start: 2024-11-01 | End: 2024-11-04 | Stop reason: HOSPADM

## 2024-11-01 RX ORDER — PROPOFOL 10 MG/ML
INJECTION, EMULSION INTRAVENOUS
Status: DISCONTINUED | OUTPATIENT
Start: 2024-11-01 | End: 2024-11-01 | Stop reason: SDUPTHER

## 2024-11-01 RX ORDER — SODIUM CHLORIDE 0.9 % (FLUSH) 0.9 %
5-40 SYRINGE (ML) INJECTION EVERY 12 HOURS SCHEDULED
Status: DISCONTINUED | OUTPATIENT
Start: 2024-11-01 | End: 2024-11-01 | Stop reason: HOSPADM

## 2024-11-01 RX ORDER — SODIUM CHLORIDE 9 MG/ML
INJECTION, SOLUTION INTRAVENOUS CONTINUOUS
Status: DISCONTINUED | OUTPATIENT
Start: 2024-11-01 | End: 2024-11-01

## 2024-11-01 RX ORDER — ONDANSETRON 4 MG/1
4 TABLET, ORALLY DISINTEGRATING ORAL EVERY 8 HOURS PRN
Status: DISCONTINUED | OUTPATIENT
Start: 2024-11-01 | End: 2024-11-04 | Stop reason: HOSPADM

## 2024-11-01 RX ORDER — CYCLOBENZAPRINE HCL 10 MG
10 TABLET ORAL EVERY 12 HOURS PRN
Status: DISCONTINUED | OUTPATIENT
Start: 2024-11-01 | End: 2024-11-04 | Stop reason: HOSPADM

## 2024-11-01 RX ORDER — SODIUM CHLORIDE 9 MG/ML
INJECTION, SOLUTION INTRAVENOUS PRN
Status: DISCONTINUED | OUTPATIENT
Start: 2024-11-01 | End: 2024-11-01 | Stop reason: HOSPADM

## 2024-11-01 RX ORDER — FENTANYL CITRATE 50 UG/ML
INJECTION, SOLUTION INTRAMUSCULAR; INTRAVENOUS
Status: COMPLETED
Start: 2024-11-01 | End: 2024-11-01

## 2024-11-01 RX ORDER — FENTANYL CITRATE 50 UG/ML
INJECTION, SOLUTION INTRAMUSCULAR; INTRAVENOUS
Status: DISCONTINUED | OUTPATIENT
Start: 2024-11-01 | End: 2024-11-01 | Stop reason: SDUPTHER

## 2024-11-01 RX ORDER — OXYCODONE HYDROCHLORIDE 5 MG/1
5 TABLET ORAL EVERY 4 HOURS PRN
Status: DISCONTINUED | OUTPATIENT
Start: 2024-11-01 | End: 2024-11-04 | Stop reason: HOSPADM

## 2024-11-01 RX ORDER — ONDANSETRON 2 MG/ML
4 INJECTION INTRAMUSCULAR; INTRAVENOUS EVERY 6 HOURS PRN
Status: DISCONTINUED | OUTPATIENT
Start: 2024-11-01 | End: 2024-11-04 | Stop reason: HOSPADM

## 2024-11-01 RX ORDER — METOPROLOL TARTRATE 1 MG/ML
INJECTION, SOLUTION INTRAVENOUS
Status: DISCONTINUED | OUTPATIENT
Start: 2024-11-01 | End: 2024-11-01 | Stop reason: SDUPTHER

## 2024-11-01 RX ORDER — SODIUM CHLORIDE 0.9 % (FLUSH) 0.9 %
5-40 SYRINGE (ML) INJECTION EVERY 12 HOURS SCHEDULED
Status: DISCONTINUED | OUTPATIENT
Start: 2024-11-01 | End: 2024-11-04 | Stop reason: HOSPADM

## 2024-11-01 RX ORDER — SODIUM CHLORIDE 0.9 % (FLUSH) 0.9 %
5-40 SYRINGE (ML) INJECTION PRN
Status: DISCONTINUED | OUTPATIENT
Start: 2024-11-01 | End: 2024-11-01 | Stop reason: HOSPADM

## 2024-11-01 RX ORDER — HYDRALAZINE HYDROCHLORIDE 20 MG/ML
10 INJECTION INTRAMUSCULAR; INTRAVENOUS
Status: DISCONTINUED | OUTPATIENT
Start: 2024-11-01 | End: 2024-11-01 | Stop reason: HOSPADM

## 2024-11-01 RX ORDER — OXYCODONE HYDROCHLORIDE 5 MG/1
10 TABLET ORAL EVERY 4 HOURS PRN
Status: DISCONTINUED | OUTPATIENT
Start: 2024-11-01 | End: 2024-11-04 | Stop reason: HOSPADM

## 2024-11-01 RX ORDER — HYDROCODONE BITARTRATE AND ACETAMINOPHEN 5; 325 MG/1; MG/1
1 TABLET ORAL ONCE
Status: COMPLETED | OUTPATIENT
Start: 2024-11-01 | End: 2024-11-01

## 2024-11-01 RX ORDER — SODIUM CHLORIDE 9 MG/ML
INJECTION, SOLUTION INTRAVENOUS PRN
Status: DISCONTINUED | OUTPATIENT
Start: 2024-11-01 | End: 2024-11-02

## 2024-11-01 RX ORDER — POLYETHYLENE GLYCOL 3350 17 G/17G
17 POWDER, FOR SOLUTION ORAL DAILY
Status: DISCONTINUED | OUTPATIENT
Start: 2024-11-01 | End: 2024-11-04 | Stop reason: HOSPADM

## 2024-11-01 RX ORDER — SENNOSIDES A AND B 8.6 MG/1
1 TABLET, FILM COATED ORAL NIGHTLY
Status: DISCONTINUED | OUTPATIENT
Start: 2024-11-01 | End: 2024-11-04 | Stop reason: HOSPADM

## 2024-11-01 RX ORDER — ESMOLOL HYDROCHLORIDE 10 MG/ML
INJECTION INTRAVENOUS
Status: DISCONTINUED | OUTPATIENT
Start: 2024-11-01 | End: 2024-11-01 | Stop reason: SDUPTHER

## 2024-11-01 RX ADMIN — HYDROMORPHONE HYDROCHLORIDE 0.25 MG: 1 INJECTION, SOLUTION INTRAMUSCULAR; INTRAVENOUS; SUBCUTANEOUS at 16:21

## 2024-11-01 RX ADMIN — MORPHINE SULFATE 2 MG: 2 INJECTION, SOLUTION INTRAMUSCULAR; INTRAVENOUS at 17:53

## 2024-11-01 RX ADMIN — FENTANYL CITRATE 50 MCG: 50 INJECTION, SOLUTION INTRAMUSCULAR; INTRAVENOUS at 16:07

## 2024-11-01 RX ADMIN — SODIUM CHLORIDE, PRESERVATIVE FREE 10 ML: 5 INJECTION INTRAVENOUS at 20:31

## 2024-11-01 RX ADMIN — DEXAMETHASONE SODIUM PHOSPHATE 10 MG: 10 INJECTION, EMULSION INTRAMUSCULAR; INTRAVENOUS at 14:21

## 2024-11-01 RX ADMIN — ROCURONIUM BROMIDE 50 MG: 10 INJECTION INTRAVENOUS at 14:21

## 2024-11-01 RX ADMIN — ONDANSETRON 4 MG: 2 INJECTION INTRAMUSCULAR; INTRAVENOUS at 14:21

## 2024-11-01 RX ADMIN — ESMOLOL HYDROCHLORIDE 50 MG: 10 INJECTION, SOLUTION INTRAVENOUS at 14:45

## 2024-11-01 RX ADMIN — PROPOFOL 125 MCG/KG/MIN: 10 INJECTION, EMULSION INTRAVENOUS at 14:23

## 2024-11-01 RX ADMIN — SODIUM CHLORIDE, PRESERVATIVE FREE 10 ML: 5 INJECTION INTRAVENOUS at 20:34

## 2024-11-01 RX ADMIN — CYCLOBENZAPRINE 10 MG: 10 TABLET, FILM COATED ORAL at 22:08

## 2024-11-01 RX ADMIN — FLUTICASONE PROPIONATE 1 SPRAY: 50 SPRAY, METERED NASAL at 08:06

## 2024-11-01 RX ADMIN — WATER 2000 MG: 1 INJECTION INTRAMUSCULAR; INTRAVENOUS; SUBCUTANEOUS at 14:30

## 2024-11-01 RX ADMIN — SODIUM CHLORIDE, PRESERVATIVE FREE 10 ML: 5 INJECTION INTRAVENOUS at 08:06

## 2024-11-01 RX ADMIN — SENNOSIDES 8.6 MG: 8.6 TABLET, FILM COATED ORAL at 20:34

## 2024-11-01 RX ADMIN — HYDROMORPHONE HYDROCHLORIDE 1 MG: 2 INJECTION INTRAMUSCULAR; INTRAVENOUS; SUBCUTANEOUS at 14:45

## 2024-11-01 RX ADMIN — ATORVASTATIN CALCIUM 40 MG: 40 TABLET, FILM COATED ORAL at 20:34

## 2024-11-01 RX ADMIN — FENTANYL CITRATE 100 MCG: 50 INJECTION, SOLUTION INTRAMUSCULAR; INTRAVENOUS at 14:50

## 2024-11-01 RX ADMIN — OXYCODONE 10 MG: 5 TABLET ORAL at 22:07

## 2024-11-01 RX ADMIN — CEFAZOLIN 2000 MG: 2 INJECTION, POWDER, FOR SOLUTION INTRAVENOUS at 22:09

## 2024-11-01 RX ADMIN — ROCURONIUM BROMIDE 30 MG: 10 INJECTION INTRAVENOUS at 15:00

## 2024-11-01 RX ADMIN — METOPROLOL TARTRATE 5 MG: 5 INJECTION INTRAVENOUS at 14:45

## 2024-11-01 RX ADMIN — PROPOFOL 150 MG: 10 INJECTION, EMULSION INTRAVENOUS at 14:21

## 2024-11-01 RX ADMIN — HYDROMORPHONE HYDROCHLORIDE 0.25 MG: 1 INJECTION, SOLUTION INTRAMUSCULAR; INTRAVENOUS; SUBCUTANEOUS at 16:13

## 2024-11-01 RX ADMIN — MORPHINE SULFATE 4 MG: 4 INJECTION, SOLUTION INTRAMUSCULAR; INTRAVENOUS at 20:30

## 2024-11-01 RX ADMIN — SUGAMMADEX 200 MG: 100 INJECTION, SOLUTION INTRAVENOUS at 15:42

## 2024-11-01 RX ADMIN — Medication 50 MG: at 14:21

## 2024-11-01 RX ADMIN — HYDROCODONE BITARTRATE AND ACETAMINOPHEN 1 TABLET: 5; 325 TABLET ORAL at 08:10

## 2024-11-01 RX ADMIN — SODIUM CHLORIDE: 9 INJECTION, SOLUTION INTRAVENOUS at 15:00

## 2024-11-01 RX ADMIN — HYDROMORPHONE HYDROCHLORIDE 1 MG: 2 INJECTION INTRAMUSCULAR; INTRAVENOUS; SUBCUTANEOUS at 14:21

## 2024-11-01 RX ADMIN — Medication 100 MG: at 14:21

## 2024-11-01 RX ADMIN — ESMOLOL HYDROCHLORIDE 50 MG: 10 INJECTION, SOLUTION INTRAVENOUS at 14:35

## 2024-11-01 RX ADMIN — BUSPIRONE HYDROCHLORIDE 15 MG: 7.5 TABLET ORAL at 08:06

## 2024-11-01 RX ADMIN — SODIUM CHLORIDE: 9 INJECTION, SOLUTION INTRAVENOUS at 11:35

## 2024-11-01 ASSESSMENT — PAIN SCALES - GENERAL
PAINLEVEL_OUTOF10: 5
PAINLEVEL_OUTOF10: 0
PAINLEVEL_OUTOF10: 6
PAINLEVEL_OUTOF10: 6
PAINLEVEL_OUTOF10: 9
PAINLEVEL_OUTOF10: 8
PAINLEVEL_OUTOF10: 8
PAINLEVEL_OUTOF10: 7
PAINLEVEL_OUTOF10: 6
PAINLEVEL_OUTOF10: 6
PAINLEVEL_OUTOF10: 9
PAINLEVEL_OUTOF10: 5
PAINLEVEL_OUTOF10: 9

## 2024-11-01 ASSESSMENT — PAIN DESCRIPTION - LOCATION
LOCATION: NECK
LOCATION: NECK;SHOULDER
LOCATION: NECK
LOCATION: NECK;BACK
LOCATION: HEAD;NECK
LOCATION: NECK
LOCATION: HEAD;NECK
LOCATION: NECK

## 2024-11-01 ASSESSMENT — PAIN DESCRIPTION - DESCRIPTORS
DESCRIPTORS: SORE;ACHING
DESCRIPTORS: ACHING
DESCRIPTORS: ACHING
DESCRIPTORS: SORE;DISCOMFORT
DESCRIPTORS: ACHING;SORE
DESCRIPTORS: ACHING
DESCRIPTORS: ACHING;OTHER (COMMENT)
DESCRIPTORS: SORE;ACHING;DISCOMFORT

## 2024-11-01 ASSESSMENT — PAIN DESCRIPTION - ORIENTATION
ORIENTATION: UPPER
ORIENTATION: MID
ORIENTATION: POSTERIOR
ORIENTATION: POSTERIOR
ORIENTATION: UPPER
ORIENTATION: MID

## 2024-11-01 NOTE — ANESTHESIA POSTPROCEDURE EVALUATION
Department of Anesthesiology  Postprocedure Note    Patient: Nyasia Rinaldi  MRN: [id number]  YOB: 1985  Date of evaluation: 11/1/2024    Procedure Summary       Date: 11/01/24 Room / Location: [facility] OR  / [facility] OR    Anesthesia Start: 1416 Anesthesia Stop: 1602    Procedure: CERVICAL LAMINECTOMY FUSION ANTERIOR C4-C5, C6-C7 (Neck) Diagnosis:       Left-sided weakness      (Left-sided weakness [R53.1])    Surgeons: Ladan Pacheco MD Responsible Provider: Lamont Rob MD    Anesthesia Type: general ASA Status: 3            Anesthesia Type: No value filed.    Anatoly Phase I: Anatoly Score: 8    Anatoly Phase II:      Anesthesia Post Evaluation    Patient location during evaluation: PACU  Patient participation: complete - patient participated  Level of consciousness: awake and alert  Pain score: 3  Airway patency: patent  Nausea & Vomiting: no nausea and no vomiting  Cardiovascular status: hemodynamically stable and blood pressure returned to baseline  Respiratory status: spontaneous ventilation, acceptable and nasal cannula  Hydration status: stable  Pain management: adequate and satisfactory to patient    No notable events documented.

## 2024-11-01 NOTE — PROGRESS NOTES
1601- pt to pacu. Pt drowsy, minimally responsive. VSS. Pt appears in no acute distress.    1611- pt reports pain remains,. Continue to medicate.    1624- pt reports pain improving, VSS. Pt resting in bed eyes closed.     1632- pt continues to report tolerable pain. VSS.     1641- pt meets criteria for discharge from pacu, report called to 6K nurse Yanira awaiting transport. OR staff reported unable to find . Will send message.

## 2024-11-01 NOTE — PROGRESS NOTES
Hospitalist Progress Note      Patient:  Nyasia Rinaldi    Unit/Bed:6K-20/020-A  YOB: 1985  MRN: [id number]   Acct: [id number]   PCP: Michael Patel APRN - CNP  Date of Admission: 10/28/2024    Assessment/Plan:    Left sided weakness with associated headache   Severe Cervical Stenosis   CVA ruled out.  MRI cervical spine with and without contrast 10/30/2024 revealed severe cervical stenosis at multiple levels.   -Plan for surgery tomorrow per neurosurgery/ spine team.  -Neurology following-low suspicion for migraine at this time.  No further recommendations.  -Analgesia for headache as needed     Mild LVOT obstruction/ mild aortic stenosis- asymptomatic   TTE 10/29/2024 with normal EF 65%, mild LVOT obstruction at rest peak gradient at rest 11 mmHg due to BETTY.  Mild aortic stenosis with mean gradient 13 mmHg and FLACO area by continuity VTI 1.9 cm².  Mild systolic anterior motion of the anterior leaflet of mitral valve with a mild pressure gradient.  Mild TR.   -will consider cardiology consult if need any surgical intervention is planned per neurosurgery.  Otherwise will need to follow-up outpatient with cardiology/ PCP.    NIDDMII A1c 6.4 on 3/13/2024  At home on Trulicity once a week.  -continue humalog sliding scale and Hypoglycemia protocol in patient with goal -180.    Chronic BLE edema/ Suspected lymphedema   At home on lasix 20 mg daily with potassium supplement  - continue for now. Hold prior to surgery.     Primary HTN  Persistently hypertensive since admission.  Possibly acute in the setting of headache/acute illness.   -Start on Losartan 25 mg daily in addition to Lasix- hold tomorrow prior to surgery. Monitor BP.       Chronic conditions -follow up outpatient   Asthma- continue PRN albuterol   Depression- continue home Buspar   Obesity BMI 33.  on healthy lifestyle modificaitons  Hemorrhoids- noted       Code Status: Full  (TTE) complete (PRN contrast/bubble/strain/3D)    Addendum Date: 10/29/2024      Left Ventricle: Normal left ventricular systolic function. EF by visual approximation is 65%. Left ventricle size is normal. Normal wall thickness. Normal wall motion. Normal diastolic function. Mild LVOT obstruction at rest. LVOT peak gradient at rest is 11 mmHg due to BETTY   Aortic Valve: Not well visualized. Mild stenosis of the aortic valve. AV mean gradient is 13 mmHg. AV area by continuity VTI is 1.9 cm2.   Mitral Valve: Mild systolic anterior motion of the anterior leaflet with a mild pressure gradient.   Tricuspid Valve: Mild regurgitation.   Interatrial Septum: No interatrial shunt visualized with color Doppler. Grade 0 Absence of bubbles. Agitated saline study was negative with and without provocation.   Image quality is technically difficult.     Result Date: 10/29/2024    Left Ventricle: Normal left ventricular systolic function. EF by visual approximation is 65%. Left ventricle size is normal. Normal wall thickness. Normal wall motion. Normal diastolic function. Mild LVOT obstruction at rest. LVOT peak gradient at rest is 11 mmHg due to BETTY   Aortic Valve: Mild stenosis of the aortic valve. AV mean gradient is 13 mmHg. AV area by continuity VTI is 1.9 cm2.   Mitral Valve: Mild systolic anterior motion of the anterior leaflet with a mild pressure gradient.   Tricuspid Valve: Mild regurgitation.   Interatrial Septum: No interatrial shunt visualized with color Doppler. Grade 0 Absence of bubbles. Agitated saline study was negative with and without provocation.   Image quality is technically difficult.     MRI BRAIN W WO CONTRAST    Result Date: 10/29/2024  PROCEDURE: MRI BRAIN W WO CONTRAST CLINICAL INFORMATION: left sided weakness, vision changes. COMPARISON: Head CT 10/28/2024. TECHNIQUE: Multiplanar and multiple spin echo T1 and T2-weighted images were obtained through the brain before and after the administration of

## 2024-11-01 NOTE — ANESTHESIA PRE PROCEDURE
Department of Anesthesiology  Preprocedure Note       Name:  Nyasia Rinaldi   Age:  39 y.o.  :  1985                                          MRN:  [id number]         Date:  2024      Surgeon: Surgeon(s):  Ladan Pacheco MD    Procedure: Procedure(s):  CERVICAL LAMINECTOMY FUSION ANTERIOR C4-C5, C6-C7    Medications prior to admission:   Prior to Admission medications    Medication Sig Start Date End Date Taking? Authorizing Provider   albuterol sulfate HFA (VENTOLIN HFA) 108 (90 Base) MCG/ACT inhaler Inhale 2 puffs into the lungs 4 times daily as needed for Wheezing 24  Yes Grecia Chappell APRN - CNP   furosemide (LASIX) 20 MG tablet Take 1 tablet by mouth daily 3/6/24  Yes Michael Patel APRN - CNP   potassium chloride (KLOR-CON M) 10 MEQ extended release tablet Take 1 tablet by mouth daily 3/6/24  Yes Michael Patel APRN - CNP   busPIRone (BUSPAR) 15 MG tablet Take 15 mg by mouth daily 23 Yes Michael Patel APRN - CNP   Dulaglutide (TRULICITY) 4.5 MG/0.5ML SOAJ Inject 4.5 mg into the skin once a week 10/28/24   Michael Patel APRN - CNP   fluticasone (FLONASE) 50 MCG/ACT nasal spray 1 spray by Each Nostril route daily 24   Damir Keller APRN - CNP   traZODone (DESYREL) 50 MG tablet Take 1 tablet by mouth nightly  Patient not taking: Reported on 2024 3/6/24   Michael Patel APRN - CNP       Current medications:    Current Facility-Administered Medications   Medication Dose Route Frequency Provider Last Rate Last Admin   • ceFAZolin (ANCEF) 2,000 mg in sterile water 20 mL IV syringe  2,000 mg IntraVENous On Call to OR Stuart Starks PA-C       • HYDROcodone-acetaminophen (NORCO) 5-325 MG per tablet 1 tablet  1 tablet Oral Q6H PRN Jimmy Roque MD       • senna (SENOKOT) tablet 8.6 mg  1 tablet Oral Nightly Jimmy Roque MD       • docusate sodium (COLACE) capsule 100 mg  100 mg Oral Daily Jimmy Roque MD       • [START ON 2024] naloxegol

## 2024-11-01 NOTE — PLAN OF CARE
Problem: Chronic Conditions and Co-morbidities  Goal: Patient's chronic conditions and co-morbidity symptoms are monitored and maintained or improved  11/1/2024 1249 by Aide Christy RN  Outcome: Progressing  10/31/2024 2339 by Bire Canada RN  Outcome: Progressing     Problem: Discharge Planning  Goal: Discharge to home or other facility with appropriate resources  11/1/2024 1249 by Aide Christy RN  Outcome: Progressing  10/31/2024 2339 by Brie Canada RN  Outcome: Progressing     Problem: Pain  Goal: Verbalizes/displays adequate comfort level or baseline comfort level  11/1/2024 1249 by Aide Christy RN  Outcome: Progressing  10/31/2024 2339 by Brie Canada RN  Outcome: Progressing     Problem: Safety - Adult  Goal: Free from fall injury  11/1/2024 1249 by Aide Christy RN  Outcome: Progressing  10/31/2024 2339 by Brie Canada RN  Outcome: Progressing   Care plan reviewed with patient.  Patient verbalizes understanding of the plan of care and contributes to goal setting.

## 2024-11-01 NOTE — PROGRESS NOTES
Hospitalist Progress Note      Patient:  Nyasia Rinaldi    Unit/Bed:6K-20/020-A  YOB: 1985  MRN: [id number]   Acct: [id number]   PCP: Michael Patel APRN - CNP  Date of Admission: 10/28/2024    Assessment/Plan:    Left sided weakness with associated headache   Severe Cervical Stenosis   CVA ruled out.  MRI cervical spine with and without contrast 10/30/2024 revealed severe cervical stenosis at multiple levels.   -Plan for surgery today per neurosurgery/ spine team.  -Neurology following-low suspicion for migraine at this time.  No further recommendations.  -Analgesia for headache as needed - norco added to tylenol prn    Mild LVOT obstruction/ mild aortic stenosis- asymptomatic   TTE 10/29/2024 with normal EF 65%, mild LVOT obstruction at rest peak gradient at rest 11 mmHg due to BETTY.  Mild aortic stenosis with mean gradient 13 mmHg and FLACO area by continuity VTI 1.9 cm².  Mild systolic anterior motion of the anterior leaflet of mitral valve with a mild pressure gradient.  Mild TR.   -will need to follow-up outpatient with cardiology/ PCP.    NIDDMII A1c 6.4 on 3/13/2024  At home on Trulicity once a week.  -continue humalog sliding scale and Hypoglycemia protocol in patient with goal -180.    Chronic BLE edema/ Suspected lymphedema   At home on lasix 20 mg daily with potassium supplement  - continue for now. Hold prior to surgery.     Primary HTN  Persistently hypertensive since admission.  Possibly acute in the setting of headache/acute illness.   -Start on Losartan 25 mg daily in addition to Lasix- hold tomorrow prior to surgery. Monitor BP.       Chronic conditions -follow up outpatient   Asthma- continue PRN albuterol   Depression- continue home Buspar   Obesity BMI 33.  on healthy lifestyle modificaitons  Hemorrhoids- noted       Code Status: Full Code    Antibiotics: []Yes  [x]No  Steroids: []Yes  [x]No  Fluids: none  Diet:  venous sinuses patent without filling defects. No focal aneurysm or central vascular abnormality. No peripheral vascular malformations demonstrated.     Impression: No acute vascular abnormalities. This document has been electronically signed by: Thom Maynard MD on 10/28/2024 10:37 PM All CTs at this facility use dose modulation techniques and iterative reconstructions, and/or weight-based dosing when appropriate to reduce radiation to a low as reasonably achievable. 3D Post-processing was performed on this study.    CT Head W/O Contrast    Result Date: 10/28/2024  CT head without contrast Comparison: 01/23/2007 Findings: No intracranial mass, midline shift, hydrocephalus, or acute hemorrhage. No acute process in sinuses or mastoids. No acute bony abnormality.     Impression: No acute intracranial process This document has been electronically signed by: Thom Maynard MD on 10/28/2024 10:33 PM All CTs at this facility use dose modulation techniques and iterative reconstructions, and/or weight-based dosing when appropriate to reduce radiation to a low as reasonably achievable.          Electronically signed by Jimmy Roque MD 11/1/2024

## 2024-11-01 NOTE — BRIEF OP NOTE
Brief Postoperative Note      Patient: Nyasia Rinaldi  YOB: 1985  MRN: [id number]    Date of Procedure: 11/1/2024    Pre-Op Diagnosis Codes:      * Left-sided weakness [R53.1]    Post-Op Diagnosis: Same       Procedure(s):  CERVICAL LAMINECTOMY FUSION ANTERIOR C4-C5, C6-C7    Surgeon(s):  Ladan Pacheco MD    Assistant:  Physician Assistant: Stuart Starks PA-C    Anesthesia: General    Estimated Blood Loss (mL): 30    Complications: None    Specimens:   * No specimens in log *    Implants:  Implant Name Type Inv. Item Serial No.  Lot No. LRB No. Used Action   GRAFT BNE PTTY 2.5 CC BIOMAX - [id number]  GRAFT BNE PTTY 2.5 CC BIOMAX  [employer] [id number] N/A 1 Implanted   SPACER SPNL LORDTC 7 DEG [id number] MM ANTR CERV IF UNISON -C - [id number]  SPACER SPNL LORDTC 7 DEG [id number] MM ANTR CERV IF UNISON -C  [employer] [id number] N/A 1 Implanted   SPACER SPNL LORDTC 7 DEG [id number] MM ANTR CERV IF UNISON -C - [id number]  SPACER SPNL LORDTC 7 DEG [id number] MM ANTR CERV IF UNISON -C  [employer] [id number] N/A 1 Implanted   SCREW SPNL SD 3.4X12 MM FOR ANTR CERV FIX SYS UNISON -C - [id number]  SCREW SPNL SD 3.4X12 MM FOR ANTR CERV FIX SYS UNISON -C  [employer]  N/A 2 Implanted   SCREW SPNL L14MM DIA3.4MM SELF DRL ANTR CERV UNISON-C - [id number]  SCREW SPNL L14MM DIA3.4MM SELF DRL ANTR CERV UNISON-C  [employer]  N/A 2 Implanted         Drains: * No LDAs found *    Findings:  Infection Present At Time Of Surgery (PATOS) (choose all levels that have infection present):  No infection present  Other Findings: cervical stenosis    Electronically signed by Stuart Starks PA-C on 11/1/2024 at 3:59 PM

## 2024-11-02 LAB
ANION GAP SERPL CALC-SCNC: 15 MEQ/L (ref 8–16)
BUN SERPL-MCNC: 13 MG/DL (ref 7–22)
CALCIUM SERPL-MCNC: 9.7 MG/DL (ref 8.5–10.5)
CHLORIDE SERPL-SCNC: 95 MEQ/L (ref 98–111)
CO2 SERPL-SCNC: 25 MEQ/L (ref 23–33)
CREAT SERPL-MCNC: 0.7 MG/DL (ref 0.4–1.2)
DEPRECATED RDW RBC AUTO: 37.9 FL (ref 35–45)
ERYTHROCYTE [DISTWIDTH] IN BLOOD BY AUTOMATED COUNT: 12.4 % (ref 11.5–14.5)
GFR SERPL CREATININE-BSD FRML MDRD: > 90 ML/MIN/1.73M2
GLUCOSE SERPL-MCNC: 209 MG/DL (ref 70–108)
HCT VFR BLD AUTO: 42.5 % (ref 37–47)
HGB BLD-MCNC: 14.6 GM/DL (ref 12–16)
MAGNESIUM SERPL-MCNC: 1.9 MG/DL (ref 1.6–2.4)
MCH RBC QN AUTO: 29.1 PG (ref 26–33)
MCHC RBC AUTO-ENTMCNC: 34.4 GM/DL (ref 32.2–35.5)
MCV RBC AUTO: 84.8 FL (ref 81–99)
PLATELET # BLD AUTO: 259 THOU/MM3 (ref 130–400)
PMV BLD AUTO: 9.4 FL (ref 9.4–12.4)
POTASSIUM SERPL-SCNC: 4.8 MEQ/L (ref 3.5–5.2)
RBC # BLD AUTO: 5.01 MILL/MM3 (ref 4.2–5.4)
SODIUM SERPL-SCNC: 135 MEQ/L (ref 135–145)
WBC # BLD AUTO: 14.5 THOU/MM3 (ref 4.8–10.8)

## 2024-11-02 PROCEDURE — 6370000000 HC RX 637 (ALT 250 FOR IP): Performed by: PHYSICIAN ASSISTANT

## 2024-11-02 PROCEDURE — 2580000003 HC RX 258: Performed by: PHYSICIAN ASSISTANT

## 2024-11-02 PROCEDURE — 99231 SBSQ HOSP IP/OBS SF/LOW 25: CPT | Performed by: STUDENT IN AN ORGANIZED HEALTH CARE EDUCATION/TRAINING PROGRAM

## 2024-11-02 PROCEDURE — 97116 GAIT TRAINING THERAPY: CPT

## 2024-11-02 PROCEDURE — 80048 BASIC METABOLIC PNL TOTAL CA: CPT

## 2024-11-02 PROCEDURE — 6370000000 HC RX 637 (ALT 250 FOR IP): Performed by: INTERNAL MEDICINE

## 2024-11-02 PROCEDURE — 36415 COLL VENOUS BLD VENIPUNCTURE: CPT

## 2024-11-02 PROCEDURE — 83735 ASSAY OF MAGNESIUM: CPT

## 2024-11-02 PROCEDURE — 85027 COMPLETE CBC AUTOMATED: CPT

## 2024-11-02 PROCEDURE — 6360000002 HC RX W HCPCS: Performed by: PHYSICIAN ASSISTANT

## 2024-11-02 PROCEDURE — 6370000000 HC RX 637 (ALT 250 FOR IP): Performed by: STUDENT IN AN ORGANIZED HEALTH CARE EDUCATION/TRAINING PROGRAM

## 2024-11-02 PROCEDURE — 1200000003 HC TELEMETRY R&B

## 2024-11-02 RX ORDER — ACETAMINOPHEN 325 MG/1
650 TABLET ORAL EVERY 4 HOURS
Status: DISCONTINUED | OUTPATIENT
Start: 2024-11-02 | End: 2024-11-04 | Stop reason: HOSPADM

## 2024-11-02 RX ORDER — BUMETANIDE 1 MG/1
1 TABLET ORAL DAILY
Status: DISCONTINUED | OUTPATIENT
Start: 2024-11-02 | End: 2024-11-04 | Stop reason: HOSPADM

## 2024-11-02 RX ADMIN — DOCUSATE SODIUM 100 MG: 100 CAPSULE, LIQUID FILLED ORAL at 08:44

## 2024-11-02 RX ADMIN — MORPHINE SULFATE 4 MG: 4 INJECTION, SOLUTION INTRAMUSCULAR; INTRAVENOUS at 00:05

## 2024-11-02 RX ADMIN — CEFAZOLIN 2000 MG: 2 INJECTION, POWDER, FOR SOLUTION INTRAVENOUS at 06:50

## 2024-11-02 RX ADMIN — OXYCODONE 10 MG: 5 TABLET ORAL at 16:29

## 2024-11-02 RX ADMIN — POTASSIUM CHLORIDE 10 MEQ: 1500 TABLET, EXTENDED RELEASE ORAL at 08:45

## 2024-11-02 RX ADMIN — SODIUM CHLORIDE, PRESERVATIVE FREE 10 ML: 5 INJECTION INTRAVENOUS at 08:06

## 2024-11-02 RX ADMIN — MORPHINE SULFATE 4 MG: 4 INJECTION, SOLUTION INTRAMUSCULAR; INTRAVENOUS at 07:49

## 2024-11-02 RX ADMIN — OXYCODONE 10 MG: 5 TABLET ORAL at 06:48

## 2024-11-02 RX ADMIN — BUMETANIDE 1 MG: 1 TABLET ORAL at 11:00

## 2024-11-02 RX ADMIN — OXYCODONE 10 MG: 5 TABLET ORAL at 10:59

## 2024-11-02 RX ADMIN — POLYETHYLENE GLYCOL 3350 17 G: 17 POWDER, FOR SOLUTION ORAL at 08:46

## 2024-11-02 RX ADMIN — Medication 12.5 MG: at 06:48

## 2024-11-02 RX ADMIN — OXYCODONE 10 MG: 5 TABLET ORAL at 22:37

## 2024-11-02 RX ADMIN — CYCLOBENZAPRINE 10 MG: 10 TABLET, FILM COATED ORAL at 22:36

## 2024-11-02 RX ADMIN — SENNOSIDES 8.6 MG: 8.6 TABLET, FILM COATED ORAL at 19:37

## 2024-11-02 RX ADMIN — ACETAMINOPHEN 650 MG: 325 TABLET ORAL at 10:58

## 2024-11-02 RX ADMIN — BUSPIRONE HYDROCHLORIDE 15 MG: 7.5 TABLET ORAL at 08:43

## 2024-11-02 RX ADMIN — ACETAMINOPHEN 650 MG: 325 TABLET ORAL at 22:37

## 2024-11-02 RX ADMIN — OXYCODONE 10 MG: 5 TABLET ORAL at 02:38

## 2024-11-02 RX ADMIN — Medication 6 MG: at 00:05

## 2024-11-02 RX ADMIN — MORPHINE SULFATE 4 MG: 4 INJECTION, SOLUTION INTRAMUSCULAR; INTRAVENOUS at 03:56

## 2024-11-02 RX ADMIN — SODIUM CHLORIDE, PRESERVATIVE FREE 10 ML: 5 INJECTION INTRAVENOUS at 19:38

## 2024-11-02 RX ADMIN — FLUTICASONE PROPIONATE 1 SPRAY: 50 SPRAY, METERED NASAL at 08:46

## 2024-11-02 RX ADMIN — ASPIRIN 81 MG 81 MG: 81 TABLET ORAL at 08:44

## 2024-11-02 RX ADMIN — ATORVASTATIN CALCIUM 40 MG: 40 TABLET, FILM COATED ORAL at 19:37

## 2024-11-02 ASSESSMENT — PAIN DESCRIPTION - DESCRIPTORS
DESCRIPTORS: OTHER (COMMENT)
DESCRIPTORS: OTHER (COMMENT)
DESCRIPTORS: ACHING
DESCRIPTORS: ACHING;SHARP
DESCRIPTORS: ACHING

## 2024-11-02 ASSESSMENT — PAIN DESCRIPTION - LOCATION
LOCATION: NECK
LOCATION: INCISION
LOCATION: NECK
LOCATION: NECK

## 2024-11-02 ASSESSMENT — PAIN SCALES - GENERAL
PAINLEVEL_OUTOF10: 8
PAINLEVEL_OUTOF10: 8
PAINLEVEL_OUTOF10: 4
PAINLEVEL_OUTOF10: 9
PAINLEVEL_OUTOF10: 4
PAINLEVEL_OUTOF10: 8
PAINLEVEL_OUTOF10: 0
PAINLEVEL_OUTOF10: 7
PAINLEVEL_OUTOF10: 8
PAINLEVEL_OUTOF10: 3
PAINLEVEL_OUTOF10: 6
PAINLEVEL_OUTOF10: 7
PAINLEVEL_OUTOF10: 8

## 2024-11-02 ASSESSMENT — PAIN DESCRIPTION - ORIENTATION
ORIENTATION: POSTERIOR

## 2024-11-02 ASSESSMENT — PAIN SCALES - WONG BAKER: WONGBAKER_NUMERICALRESPONSE: NO HURT

## 2024-11-02 NOTE — PROGRESS NOTES
Hospitalist Progress Note      Patient:  Nyasia Rinaldi    Unit/Bed:6K-20/020-A  YOB: 1985  MRN: [id number]   Acct: [id number]   PCP: Michael Patel APRN - CNP  Date of Admission: 10/28/2024    Assessment/Plan:    Left sided weakness with associated headache   Severe Cervical Stenosis   status post C4-5 and C6-7 ACDF 11/1/24 by Dr. Pacheco POD#1  CVA ruled out.  MRI cervical spine with and without contrast 10/30/2024 revealed severe cervical stenosis at multiple levels.   -Spine team following.  Will need to follow-up outpatient.  -Roxicodone and morphine as needed for pain control.  Flexeril 10 mg twice daily as needed for muscle spasm per surgery team.     Mild LVOT obstruction/ mild aortic stenosis- asymptomatic   TTE 10/29/2024 with normal EF 65%, mild LVOT obstruction at rest peak gradient at rest 11 mmHg due to BETTY.  Mild aortic stenosis with mean gradient 13 mmHg and FLACO area by continuity VTI 1.9 cm².  Mild systolic anterior motion of the anterior leaflet of mitral valve with a mild pressure gradient.  Mild TR.   -will need to follow-up outpatient with cardiology/ PCP.    NIDDMII A1c 6.4 on 3/13/2024  At home on Trulicity once a week.  -continue humalog sliding scale and Hypoglycemia protocol in patient with goal -180.    Chronic BLE edema/ Suspected lymphedema   At home on lasix 20 mg daily with potassium supplement  - continue for now. Hold prior to surgery.     Primary HTN  Persistently hypertensive since admission.  Possibly acute in the setting of headache/acute illness.   -Resume losartan 25 mg daily and switch Lasix to Bumex 1 mg daily. Monitor BP.       Chronic conditions -follow up outpatient   Asthma- continue PRN albuterol   Depression- continue home Buspar   Obesity BMI 33.  on healthy lifestyle modificaitons  Hemorrhoids- noted       Code Status: Full Code    Antibiotics: []Yes  [x]No  Steroids: []Yes   Ba with contrast, Multiplanar reconstructions and 3D postprocessing Comparison: None Findings: Normal configuration noted in the [place] of [place] vessels. No acute filling defect or vessel truncation noted. Dural venous sinuses patent without filling defects. No focal aneurysm or central vascular abnormality. No peripheral vascular malformations demonstrated.     Impression: No acute vascular abnormalities. This document has been electronically signed by: Thom Maynard MD on 10/28/2024 10:37 PM All CTs at this facility use dose modulation techniques and iterative reconstructions, and/or weight-based dosing when appropriate to reduce radiation to a low as reasonably achievable. 3D Post-processing was performed on this study.    CT Head W/O Contrast    Result Date: 10/28/2024  CT head without contrast Comparison: 01/23/2007 Findings: No intracranial mass, midline shift, hydrocephalus, or acute hemorrhage. No acute process in sinuses or mastoids. No acute bony abnormality.     Impression: No acute intracranial process This document has been electronically signed by: Thom Maynard MD on 10/28/2024 10:33 PM All CTs at this facility use dose modulation techniques and iterative reconstructions, and/or weight-based dosing when appropriate to reduce radiation to a low as reasonably achievable.          Electronically signed by Jimmy Roque MD 11/2/2024

## 2024-11-02 NOTE — PROGRESS NOTES
Department of Orthopedic Surgery  Spine Service  Attending Progress Note        Subjective:  POD#1, Patient doing ok, continued weakness left hand, no new issues    Vitals  VITALS:  BP (!) 167/106   Pulse (!) 108   Temp 97.8 °F (36.6 °C) (Oral)   Resp 16   Ht 1.676 m (5' 6\")   Wt 108.3 kg (238 lb 12.1 oz)   LMP 05/27/2017   SpO2 95%   BMI 38.54 kg/m²   24HR INTAKE/OUTPUT:    Intake/Output Summary (Last 24 hours) at 11/2/2024 1016  Last data filed at 11/2/2024 0532  Gross per 24 hour   Intake 1740 ml   Output 40 ml   Net 1700 ml     URINARY CATHETER OUTPUT ([place]):     DRAIN/TUBE OUTPUT:  Closed/Suction Drain Left;Anterior Neck Bulb-Output (ml): 15 ml      PHYSICAL EXAM:    Orientation:  alert and oriented to person, place and time    Incision:  dressing in place, clean, dry, intact    Upper Extremity Motor :   Deltoid:  5/5  Biceps:  5/5  Triceps:  5/5  Wrist Flexion:  5/5  Wrist Extension:  5/5  : 5/5 except 4/5 left     Upper Motor Neuron Signs:  None  Upper Extremity Sensory:  Intact C3-T1 distribution      LABS:    HgB:    Lab Results   Component Value Date/Time    HGB 14.6 11/02/2024 05:48 AM         ASSESSMENT AND PLAN:    Post operative day 1 status post C4-5 and C6-7 ACDF    1:  Monitor labs and drain output  2:  Activity Level:  as tolerated  3:  Pain Control:  good  4:  Discharge Planning:  pending, patient ok for discharge from ortho spine. Will need home health for drain management if she goes today.    Stuart Starks PA-C

## 2024-11-02 NOTE — PLAN OF CARE
Problem: Chronic Conditions and Co-morbidities  Goal: Patient's chronic conditions and co-morbidity symptoms are monitored and maintained or improved  11/2/2024 0102 by Marisol Adler RN  Outcome: Progressing     Problem: Discharge Planning  Goal: Discharge to home or other facility with appropriate resources  11/2/2024 0102 by Marisol Adler RN  Outcome: Progressing     Problem: Pain  Goal: Verbalizes/displays adequate comfort level or baseline comfort level  11/2/2024 0102 by Marisol Adler RN  Outcome: Progressing     Problem: Safety - Adult  Goal: Free from fall injury  11/2/2024 0102 by Marisol Adler RN  Outcome: Progressing

## 2024-11-02 NOTE — PROGRESS NOTES
Physical Therapy   [facility]  INPATIENT PHYSICAL THERAPY  DAILY NOTE  [facility] RENAL TELEMETRY 6K - 6K-20/020-A      Discharge Recommendations: Home with Assist as Needed  Equipment Recommendations: No               Time In: 1402  Time Out: 1412  Timed Code Treatment Minutes: 10 Minutes  Minutes: 10          Date: 2024  Patient Name: Nyasia Rinaldi,  Gender:  female        MRN: [id number]  : 1985  (39 y.o.)     Referring Practitioner: rBian Narayanan PA  Diagnosis: Left-sided weakness  Additional Pertinent Hx: Per EMR \"Nyasia Rinaldi is a 39 y.o. female who presents to [facility] with left sided weakness starting 3- 4 days ago.  Her symptoms has been constant with a worsening within the past 48 hours. She states her left kwaku specifically feels unusual and feels like it just gives way suddenly. She denies any difficulties with speech, swallowing, facial expressions/movements. She denies any recent illness, fevers, chills.   She does endorse some paresthesias of her left arm as if she fell asleep  On 2024 patient underwent CERVICAL LAMINECTOMY FUSION ANTERIOR C4-C5, C6-C7.   \"     Prior Level of Function:  Lives With: Spouse, Son, Parent (15 years old; mother and father there)  Type of Home: House  Home Layout: Two level, Performs ADL's on one level, Able to Live on Main level with bedroom/bathroom  Home Access: Stairs to enter with rails  Entrance Stairs - Number of Steps: 5-6  Entrance Stairs - Rails: Left   Bathroom Shower/Tub: Walk-in shower  Bathroom Toilet: Standard  Bathroom Equipment: None  Bathroom Accessibility: Accessible    ADL Assistance: Independent  Homemaking Assistance: Independent  Ambulation Assistance: Independent  Transfer Assistance: Independent  Active : Yes  IADL Comments: cares for mother  Additional Comments: IND and active prior    Restrictions/Precautions:  Restrictions/Precautions: General Precautions, Surgical Protocols  Required Braces or  negotiation with rail to return home safely.  Long Term Goals  Time Frame for Long Term Goals : NA due to short ELOS    Following session, patient left in safe position with all fall risk precautions in place.

## 2024-11-02 NOTE — PLAN OF CARE
Problem: Chronic Conditions and Co-morbidities  Goal: Patient's chronic conditions and co-morbidity symptoms are monitored and maintained or improved  11/2/2024 1036 by Sandoval Benitez, RN  Outcome: Progressing  Flowsheets (Taken 11/2/2024 1036)  Care Plan - Patient's Chronic Conditions and Co-Morbidity Symptoms are Monitored and Maintained or Improved: Monitor and assess patient's chronic conditions and comorbid symptoms for stability, deterioration, or improvement     Problem: Discharge Planning  Goal: Discharge to home or other facility with appropriate resources  11/2/2024 1036 by Sandoval Benitez, RN  Outcome: Progressing  Flowsheets (Taken 11/2/2024 1036)  Discharge to home or other facility with appropriate resources: Identify barriers to discharge with patient and caregiver     Problem: Pain  Goal: Verbalizes/displays adequate comfort level or baseline comfort level  11/2/2024 1036 by Sandoval Benitez, RN  Outcome: Progressing  Flowsheets (Taken 11/2/2024 1036)  Verbalizes/displays adequate comfort level or baseline comfort level: Encourage patient to monitor pain and request assistance

## 2024-11-03 LAB
ANION GAP SERPL CALC-SCNC: 10 MEQ/L (ref 8–16)
BUN SERPL-MCNC: 14 MG/DL (ref 7–22)
CALCIUM SERPL-MCNC: 8.5 MG/DL (ref 8.5–10.5)
CHLORIDE SERPL-SCNC: 101 MEQ/L (ref 98–111)
CO2 SERPL-SCNC: 29 MEQ/L (ref 23–33)
CREAT SERPL-MCNC: 0.7 MG/DL (ref 0.4–1.2)
DEPRECATED RDW RBC AUTO: 40 FL (ref 35–45)
ERYTHROCYTE [DISTWIDTH] IN BLOOD BY AUTOMATED COUNT: 12.7 % (ref 11.5–14.5)
GFR SERPL CREATININE-BSD FRML MDRD: > 90 ML/MIN/1.73M2
GLUCOSE SERPL-MCNC: 152 MG/DL (ref 70–108)
HCT VFR BLD AUTO: 42.5 % (ref 37–47)
HGB BLD-MCNC: 14.1 GM/DL (ref 12–16)
MCH RBC QN AUTO: 28.8 PG (ref 26–33)
MCHC RBC AUTO-ENTMCNC: 33.2 GM/DL (ref 32.2–35.5)
MCV RBC AUTO: 86.9 FL (ref 81–99)
PLATELET # BLD AUTO: 259 THOU/MM3 (ref 130–400)
PMV BLD AUTO: 9.4 FL (ref 9.4–12.4)
POTASSIUM SERPL-SCNC: 4.2 MEQ/L (ref 3.5–5.2)
RBC # BLD AUTO: 4.89 MILL/MM3 (ref 4.2–5.4)
SODIUM SERPL-SCNC: 140 MEQ/L (ref 135–145)
WBC # BLD AUTO: 9.8 THOU/MM3 (ref 4.8–10.8)

## 2024-11-03 PROCEDURE — 6370000000 HC RX 637 (ALT 250 FOR IP): Performed by: PHYSICIAN ASSISTANT

## 2024-11-03 PROCEDURE — 2580000003 HC RX 258: Performed by: PHYSICIAN ASSISTANT

## 2024-11-03 PROCEDURE — 99232 SBSQ HOSP IP/OBS MODERATE 35: CPT | Performed by: STUDENT IN AN ORGANIZED HEALTH CARE EDUCATION/TRAINING PROGRAM

## 2024-11-03 PROCEDURE — 6370000000 HC RX 637 (ALT 250 FOR IP): Performed by: INTERNAL MEDICINE

## 2024-11-03 PROCEDURE — 1200000003 HC TELEMETRY R&B

## 2024-11-03 PROCEDURE — 85027 COMPLETE CBC AUTOMATED: CPT

## 2024-11-03 PROCEDURE — 80048 BASIC METABOLIC PNL TOTAL CA: CPT

## 2024-11-03 PROCEDURE — 6370000000 HC RX 637 (ALT 250 FOR IP): Performed by: STUDENT IN AN ORGANIZED HEALTH CARE EDUCATION/TRAINING PROGRAM

## 2024-11-03 PROCEDURE — 36415 COLL VENOUS BLD VENIPUNCTURE: CPT

## 2024-11-03 RX ORDER — SENNOSIDES A AND B 8.6 MG/1
1 TABLET, FILM COATED ORAL NIGHTLY PRN
Qty: 14 TABLET | Refills: 0 | Status: SHIPPED | OUTPATIENT
Start: 2024-11-03 | End: 2024-11-04

## 2024-11-03 RX ORDER — BUMETANIDE 1 MG/1
1 TABLET ORAL DAILY
Qty: 30 TABLET | Refills: 0 | Status: SHIPPED | OUTPATIENT
Start: 2024-11-04 | End: 2024-11-04

## 2024-11-03 RX ORDER — OXYCODONE AND ACETAMINOPHEN 5; 325 MG/1; MG/1
1 TABLET ORAL EVERY 6 HOURS PRN
Qty: 28 TABLET | Refills: 0 | Status: SHIPPED | OUTPATIENT
Start: 2024-11-03 | End: 2024-11-10

## 2024-11-03 RX ORDER — CARVEDILOL 6.25 MG/1
6.25 TABLET ORAL 2 TIMES DAILY WITH MEALS
Status: DISCONTINUED | OUTPATIENT
Start: 2024-11-03 | End: 2024-11-04 | Stop reason: HOSPADM

## 2024-11-03 RX ORDER — LOSARTAN POTASSIUM 25 MG/1
25 TABLET ORAL DAILY
Qty: 30 TABLET | Refills: 0 | Status: SHIPPED | OUTPATIENT
Start: 2024-11-04 | End: 2024-11-04

## 2024-11-03 RX ORDER — CYCLOBENZAPRINE HCL 10 MG
10 TABLET ORAL 3 TIMES DAILY PRN
Qty: 30 TABLET | Refills: 0 | Status: SHIPPED | OUTPATIENT
Start: 2024-11-03 | End: 2024-11-04

## 2024-11-03 RX ORDER — SPIRONOLACTONE 25 MG/1
25 TABLET ORAL DAILY
Status: DISCONTINUED | OUTPATIENT
Start: 2024-11-03 | End: 2024-11-03

## 2024-11-03 RX ORDER — POLYETHYLENE GLYCOL 3350 17 G/17G
17 POWDER, FOR SOLUTION ORAL DAILY PRN
Qty: 14 PACKET | Refills: 0 | Status: SHIPPED | OUTPATIENT
Start: 2024-11-03 | End: 2024-11-04

## 2024-11-03 RX ORDER — ASPIRIN 81 MG/1
81 TABLET, CHEWABLE ORAL DAILY
Qty: 30 TABLET | Refills: 0 | Status: SHIPPED | OUTPATIENT
Start: 2024-11-04 | End: 2024-11-04

## 2024-11-03 RX ORDER — ATORVASTATIN CALCIUM 40 MG/1
40 TABLET, FILM COATED ORAL NIGHTLY
Qty: 30 TABLET | Refills: 0 | Status: SHIPPED | OUTPATIENT
Start: 2024-11-03 | End: 2024-11-04

## 2024-11-03 RX ADMIN — DOCUSATE SODIUM 100 MG: 100 CAPSULE, LIQUID FILLED ORAL at 08:46

## 2024-11-03 RX ADMIN — OXYCODONE 5 MG: 5 TABLET ORAL at 20:39

## 2024-11-03 RX ADMIN — ACETAMINOPHEN 650 MG: 325 TABLET ORAL at 14:07

## 2024-11-03 RX ADMIN — ACETAMINOPHEN 650 MG: 325 TABLET ORAL at 08:46

## 2024-11-03 RX ADMIN — BUSPIRONE HYDROCHLORIDE 15 MG: 7.5 TABLET ORAL at 08:47

## 2024-11-03 RX ADMIN — POLYETHYLENE GLYCOL 3350 17 G: 17 POWDER, FOR SOLUTION ORAL at 08:48

## 2024-11-03 RX ADMIN — ACETAMINOPHEN 650 MG: 325 TABLET ORAL at 17:38

## 2024-11-03 RX ADMIN — CARVEDILOL 6.25 MG: 6.25 TABLET, FILM COATED ORAL at 17:38

## 2024-11-03 RX ADMIN — ASPIRIN 81 MG 81 MG: 81 TABLET ORAL at 08:46

## 2024-11-03 RX ADMIN — FLUTICASONE PROPIONATE 1 SPRAY: 50 SPRAY, METERED NASAL at 08:47

## 2024-11-03 RX ADMIN — CYCLOBENZAPRINE 10 MG: 10 TABLET, FILM COATED ORAL at 14:05

## 2024-11-03 RX ADMIN — POTASSIUM CHLORIDE 10 MEQ: 1500 TABLET, EXTENDED RELEASE ORAL at 08:46

## 2024-11-03 RX ADMIN — ACETAMINOPHEN 650 MG: 325 TABLET ORAL at 23:09

## 2024-11-03 RX ADMIN — LOSARTAN POTASSIUM 25 MG: 25 TABLET, FILM COATED ORAL at 08:48

## 2024-11-03 RX ADMIN — OXYCODONE 10 MG: 5 TABLET ORAL at 02:06

## 2024-11-03 RX ADMIN — SODIUM CHLORIDE, PRESERVATIVE FREE 10 ML: 5 INJECTION INTRAVENOUS at 08:47

## 2024-11-03 RX ADMIN — Medication 12.5 MG: at 08:46

## 2024-11-03 RX ADMIN — OXYCODONE 10 MG: 5 TABLET ORAL at 14:24

## 2024-11-03 RX ADMIN — SENNOSIDES 8.6 MG: 8.6 TABLET, FILM COATED ORAL at 20:39

## 2024-11-03 RX ADMIN — SODIUM CHLORIDE, PRESERVATIVE FREE 10 ML: 5 INJECTION INTRAVENOUS at 20:39

## 2024-11-03 RX ADMIN — ATORVASTATIN CALCIUM 40 MG: 40 TABLET, FILM COATED ORAL at 20:39

## 2024-11-03 RX ADMIN — ACETAMINOPHEN 650 MG: 325 TABLET ORAL at 02:05

## 2024-11-03 RX ADMIN — OXYCODONE 10 MG: 5 TABLET ORAL at 08:46

## 2024-11-03 RX ADMIN — BUMETANIDE 1 MG: 1 TABLET ORAL at 08:47

## 2024-11-03 ASSESSMENT — PAIN DESCRIPTION - ORIENTATION: ORIENTATION: POSTERIOR

## 2024-11-03 ASSESSMENT — PAIN DESCRIPTION - LOCATION
LOCATION: NECK

## 2024-11-03 ASSESSMENT — PAIN SCALES - GENERAL
PAINLEVEL_OUTOF10: 7
PAINLEVEL_OUTOF10: 6
PAINLEVEL_OUTOF10: 5
PAINLEVEL_OUTOF10: 7
PAINLEVEL_OUTOF10: 6
PAINLEVEL_OUTOF10: 4
PAINLEVEL_OUTOF10: 5
PAINLEVEL_OUTOF10: 7

## 2024-11-03 ASSESSMENT — PAIN DESCRIPTION - DESCRIPTORS: DESCRIPTORS: OTHER (COMMENT)

## 2024-11-03 NOTE — PROGRESS NOTES
Hospitalist Progress Note      Patient:  Nyasia Rinaldi    Unit/Bed:6K-20/020-A  YOB: 1985  MRN: [id number]   Acct: [id number]   PCP: Michael Patel APRN - CNP  Date of Admission: 10/28/2024    Assessment/Plan:    Primary HTN  Persistently hypertensive since admission.  Suspect some chronic component in the setting of acute illness.   -continue losartan 25 mg daily and Bumex 1 mg daily. Add Coreg 6.25 mg BID for better control.   -Monitor BP overnight and adjust meds as needed.    Post-surgical ileus   due to spinal surgery 2 days ago. + Flatus.  -on bowel regimen. Encourage ambulation.     Left sided weakness with associated headache   Severe Cervical Stenosis   status post C4-5 and C6-7 ACDF 11/1/24 by Dr. Pacheco POD#2  CVA ruled out.  MRI cervical spine with and without contrast 10/30/2024 revealed severe cervical stenosis at multiple levels.   -Spine team signed off. Home health orders for drain care placed per ortho team. Ok for discharge with plan for follow up outpatient in 6 weeks. Will need to follow up with SW tomorrow if HH set up.  -Roxicodone and morphine as needed for pain control.  Flexeril 10 mg twice daily as needed for muscle spasm per surgery team.     Mild LVOT obstruction/ mild aortic stenosis- asymptomatic   TTE 10/29/2024 with normal EF 65%, mild LVOT obstruction at rest peak gradient at rest 11 mmHg due to BETTY.  Mild aortic stenosis with mean gradient 13 mmHg and FLACO area by continuity VTI 1.9 cm².  Mild systolic anterior motion of the anterior leaflet of mitral valve with a mild pressure gradient.  Mild TR.   -will need to follow-up outpatient with Cardiology/ PCP.    NIDDMII A1c 6.4 on 3/13/2024  At home on Trulicity once a week.  -continue humalog sliding scale and Hypoglycemia protocol in patient with goal -180.    Chronic BLE edema/ Suspected lymphedema   At home on lasix 20 mg daily with potassium supplement.  stenosis and measurements are in accordance with NASCET criteria. 3D Post-processing was performed on this study.    CTA HEAD W WO CONTRAST    Result Date: 10/28/2024  CT angiogram head/[place] of [place] with contrast, Multiplanar reconstructions and 3D postprocessing Comparison: None Findings: Normal configuration noted in the [place] of [place] vessels. No acute filling defect or vessel truncation noted. Dural venous sinuses patent without filling defects. No focal aneurysm or central vascular abnormality. No peripheral vascular malformations demonstrated.     Impression: No acute vascular abnormalities. This document has been electronically signed by: Thom Maynard MD on 10/28/2024 10:37 PM All CTs at this facility use dose modulation techniques and iterative reconstructions, and/or weight-based dosing when appropriate to reduce radiation to a low as reasonably achievable. 3D Post-processing was performed on this study.    CT Head W/O Contrast    Result Date: 10/28/2024  CT head without contrast Comparison: 01/23/2007 Findings: No intracranial mass, midline shift, hydrocephalus, or acute hemorrhage. No acute process in sinuses or mastoids. No acute bony abnormality.     Impression: No acute intracranial process This document has been electronically signed by: Thom Maynard MD on 10/28/2024 10:33 PM All CTs at this facility use dose modulation techniques and iterative reconstructions, and/or weight-based dosing when appropriate to reduce radiation to a low as reasonably achievable.          Electronically signed by Jimmy Roque MD 11/3/2024

## 2024-11-03 NOTE — PROGRESS NOTES
Department of Orthopedic Surgery  Spine Service  Attending Progress Note        Subjective:  POD#2 c/o neck being stiff. Persistent weakness in left hand. Tolerating full diet. Denies n/v. No BM    Vitals  VITALS:  BP (!) 178/111   Pulse (!) 104   Temp 98.2 °F (36.8 °C) (Oral)   Resp 16   Ht 1.676 m (5' 6\")   Wt 108.3 kg (238 lb 12.1 oz)   LMP 05/27/2017   SpO2 98%   BMI 38.54 kg/m²   24HR INTAKE/OUTPUT:    Intake/Output Summary (Last 24 hours) at 11/3/2024 0732  Last data filed at 11/2/2024 1932  Gross per 24 hour   Intake 720 ml   Output 35 ml   Net 685 ml     URINARY CATHETER OUTPUT ([place]):     DRAIN/TUBE OUTPUT:  Closed/Suction Drain Left;Anterior Neck Bulb-Output (ml): 15 ml      PHYSICAL EXAM:    Orientation:  alert and oriented to person, place and time    Incision:  dressing in place, clean, dry, intact    Upper Extremity Motor :   Deltoid:  5/5  Biceps:  5/5  Triceps:  5/5  : 5/5 except 4/5 left     Upper Motor Neuron Signs:  None  Upper Extremity Sensory:  Intact C3-T1 distribution      LABS:    HgB:    Lab Results   Component Value Date/Time    HGB 14.1 11/03/2024 05:31 AM         ASSESSMENT AND PLAN:    Post operative day 2 status post C4-5 and C6-7 ACDF    1:  Monitor labs and drain output  2:  Activity Level:  as tolerated  3:  Pain Control:  Ok  4:  Discharge Planning:  Pending, okay for discharge from ortho spine standpoint.  for drain care.   5:  F/u in office in 6 weeks. Pain medication sent to pharmacy.     ELIEZER Thompson

## 2024-11-03 NOTE — OP NOTE
Operative Note      Patient: Nyasia Rinaldi  YOB: 1985  MRN: [id number]  Account #: [id number]                               Admission Date: 10/28/2024    Provider:  Ladan Pacheco M.D.     DATE OF PROCEDURE: 11/1/2024     PREOPERATIVE DIAGNOSES:  1.  C4-C5 and C6-C7 cervical stenosis with myeloradiculopathy.  2.  C4-C5 and C6-C7 degenerative disc disease.  3.  Left  weakness  4.  Obesity, BMI of 38.54     POSTOPERATIVE DIAGNOSES:  1.  C4-C5 and C6-C7 cervical stenosis with myeloradiculopathy.  2.  C4-C5 and C6-C7 degenerative disc disease.  3.  Left  weakness  4.  Obesity, BMI of 38.54     OPERATION PERFORMED:  1.  C4-C5 and C6-C7 anterior cervical diskectomy and fusion with decompression and stabilization of spinal cord and nerve roots.  2.  C4-C5 Unison-C stand alone cage with 12 and 14-mm screws, Xtant  3.  C6-C7 Unison-C stand alone cage with 12 and 14-mm screws, Xtant     SURGEON:  Ladan Hui M.D.     ASSISTANT:  Stuart Starks PA-C.  Stuart Starks PA-C, assisted throughout the procedure with positioning, draping, retraction, wound closure, and dressing application     ANESTHESIA:  General.     INDICATIONS:  This is a 39 y.o. female who presented to the ED for left-sided weakness, numbness and headaches. She was also reporting myelopathic symptoms including gait instability. Neurology consulted, stroke work up negative. MRI cervical spine revealed severe cervical stenosis C4-5 and C6-7.   Patient therefore understood the indications for the surgery as well as risks, benefits, and alternatives.  These risks included, but are not limited to, paralysis, infection, dural tear, hematoma, nerve root injury, nonunion, permanent speech and swallowing disturbances, DVT/PE, stroke, MI, death etc. All questions were answered and informed consent was obtained.     OPERATIVE PROCEDURE:  The patient was taken to the operating room by Anesthesiology Service and had satisfactory general

## 2024-11-03 NOTE — DISCHARGE INSTRUCTIONS
Wound Care:  -Do not change dressing or shower until Home Health has removed the Hemovac drain  -Once drain is removed, start dressing changes once daily until incision is clean and dry, then okay to leave open to air.   -Do not submerge incision in water. Avoid hot-tubs and pools  -Do not shower until the drain has been removed and try to keep incision as dry as possible.   -Allow steri strips to fall off with time    Restrictions:  -Limit bending and twisting  -No lifting over 10-15 lbs  -Walk as much as tolerated, take short frequent walks throughout the day and try to take one long walk a day (start at 5 minutes and increase as tolerated)  -Wear neck brace at all times while drain is in place. Once drain is removed, okay to to remove brace and only needs to be worn when walking for the first two weeks after surgery,     Medications:  -A pain medication (Percocet) and muscle relaxer (Flexeril) will be sent to your pharmacy. Take medications as directed  -Pain medications can cause postoperative constipation. Take an over the counter stool softener while taking the pain medication  -Okay to resume vitamins and supplements one week after surgery   -Hold all NSAIDs (ibuprofen, Aleve, motrin, etc) for 6 months following surgery  -Okay to resume aspirin 72 hours after surgery and all other blood thinners 5 days following surgery unless otherwise directed    Postoperative appointment:  -Please contact the office to schedule an appointment for 6 weeks after surgery if you are not already scheduled for an appointment  [phone]     If any concerning symptoms, such as calf pain/swelling, new numbness/tingling or pain please the contact the office. If concerning symptoms including chest pain, neck swelling, inability to swallow liquids or difficulty breathing, go to the Emergency Department.

## 2024-11-03 NOTE — PLAN OF CARE
Problem: Chronic Conditions and Co-morbidities  Goal: Patient's chronic conditions and co-morbidity symptoms are monitored and maintained or improved  11/3/2024 1128 by Sandoval Benitez, RN  Outcome: Progressing  Flowsheets (Taken 11/3/2024 1128)  Care Plan - Patient's Chronic Conditions and Co-Morbidity Symptoms are Monitored and Maintained or Improved: Monitor and assess patient's chronic conditions and comorbid symptoms for stability, deterioration, or improvement     Problem: Discharge Planning  Goal: Discharge to home or other facility with appropriate resources  11/3/2024 1128 by Sandoval Benitez, RN  Outcome: Progressing  Flowsheets (Taken 11/3/2024 1128)  Discharge to home or other facility with appropriate resources: Identify barriers to discharge with patient and caregiver     Problem: Pain  Goal: Verbalizes/displays adequate comfort level or baseline comfort level  11/3/2024 1128 by Sandoval Benitez, RN  Outcome: Progressing  Flowsheets (Taken 11/3/2024 1128)  Verbalizes/displays adequate comfort level or baseline comfort level: Encourage patient to monitor pain and request assistance

## 2024-11-03 NOTE — PLAN OF CARE
Problem: Chronic Conditions and Co-morbidities  Goal: Patient's chronic conditions and co-morbidity symptoms are monitored and maintained or improved  11/3/2024 0001 by Marisol Adler RN  Outcome: Progressing     Problem: Discharge Planning  Goal: Discharge to home or other facility with appropriate resources  11/3/2024 0001 by Marisol Adler RN  Outcome: Progressing     Problem: Pain  Goal: Verbalizes/displays adequate comfort level or baseline comfort level  11/3/2024 0001 by Marisol Adler RN  Outcome: Progressing     Problem: Safety - Adult  Goal: Free from fall injury  Outcome: Progressing

## 2024-11-04 VITALS
HEART RATE: 101 BPM | WEIGHT: 238.76 LBS | TEMPERATURE: 98 F | DIASTOLIC BLOOD PRESSURE: 88 MMHG | OXYGEN SATURATION: 96 % | RESPIRATION RATE: 18 BRPM | BODY MASS INDEX: 38.37 KG/M2 | SYSTOLIC BLOOD PRESSURE: 131 MMHG | HEIGHT: 66 IN

## 2024-11-04 PROCEDURE — 6370000000 HC RX 637 (ALT 250 FOR IP): Performed by: INTERNAL MEDICINE

## 2024-11-04 PROCEDURE — 6370000000 HC RX 637 (ALT 250 FOR IP): Performed by: PHYSICIAN ASSISTANT

## 2024-11-04 PROCEDURE — 99239 HOSP IP/OBS DSCHRG MGMT >30: CPT | Performed by: STUDENT IN AN ORGANIZED HEALTH CARE EDUCATION/TRAINING PROGRAM

## 2024-11-04 PROCEDURE — 6370000000 HC RX 637 (ALT 250 FOR IP): Performed by: STUDENT IN AN ORGANIZED HEALTH CARE EDUCATION/TRAINING PROGRAM

## 2024-11-04 RX ORDER — CARVEDILOL 6.25 MG/1
6.25 TABLET ORAL 2 TIMES DAILY WITH MEALS
Qty: 60 TABLET | Refills: 0 | Status: SHIPPED | OUTPATIENT
Start: 2024-11-04 | End: 2024-12-04

## 2024-11-04 RX ORDER — SENNOSIDES A AND B 8.6 MG/1
1 TABLET, FILM COATED ORAL NIGHTLY PRN
Qty: 14 TABLET | Refills: 0 | Status: SHIPPED | OUTPATIENT
Start: 2024-11-04 | End: 2024-11-18

## 2024-11-04 RX ORDER — DULAGLUTIDE 4.5 MG/.5ML
4.5 INJECTION, SOLUTION SUBCUTANEOUS WEEKLY
Qty: 6 ML | Refills: 1 | Status: SHIPPED | OUTPATIENT
Start: 2024-11-04

## 2024-11-04 RX ORDER — POTASSIUM CHLORIDE 750 MG/1
10 TABLET, EXTENDED RELEASE ORAL DAILY
Qty: 90 TABLET | Refills: 3 | Status: SHIPPED | OUTPATIENT
Start: 2024-11-04

## 2024-11-04 RX ORDER — POLYETHYLENE GLYCOL 3350 17 G/17G
17 POWDER, FOR SOLUTION ORAL DAILY PRN
Qty: 14 PACKET | Refills: 0 | Status: SHIPPED | OUTPATIENT
Start: 2024-11-04 | End: 2024-11-18

## 2024-11-04 RX ORDER — BUSPIRONE HYDROCHLORIDE 15 MG/1
15 TABLET ORAL DAILY
Qty: 90 TABLET | Refills: 3 | Status: SHIPPED | OUTPATIENT
Start: 2024-11-04 | End: 2025-10-30

## 2024-11-04 RX ORDER — BUMETANIDE 1 MG/1
1 TABLET ORAL DAILY
Qty: 30 TABLET | Refills: 0 | Status: SHIPPED | OUTPATIENT
Start: 2024-11-04 | End: 2024-12-04

## 2024-11-04 RX ORDER — ATORVASTATIN CALCIUM 40 MG/1
40 TABLET, FILM COATED ORAL NIGHTLY
Qty: 30 TABLET | Refills: 0 | Status: SHIPPED | OUTPATIENT
Start: 2024-11-04 | End: 2024-12-04

## 2024-11-04 RX ORDER — CYCLOBENZAPRINE HCL 10 MG
10 TABLET ORAL 3 TIMES DAILY PRN
Qty: 30 TABLET | Refills: 0 | Status: SHIPPED | OUTPATIENT
Start: 2024-11-04

## 2024-11-04 RX ORDER — LOSARTAN POTASSIUM 25 MG/1
25 TABLET ORAL DAILY
Qty: 30 TABLET | Refills: 0 | Status: SHIPPED | OUTPATIENT
Start: 2024-11-04 | End: 2024-12-04

## 2024-11-04 RX ORDER — ASPIRIN 81 MG/1
81 TABLET, CHEWABLE ORAL DAILY
Qty: 30 TABLET | Refills: 0 | Status: SHIPPED | OUTPATIENT
Start: 2024-11-04 | End: 2024-12-04

## 2024-11-04 RX ADMIN — DOCUSATE SODIUM 100 MG: 100 CAPSULE, LIQUID FILLED ORAL at 08:23

## 2024-11-04 RX ADMIN — ASPIRIN 81 MG 81 MG: 81 TABLET ORAL at 08:22

## 2024-11-04 RX ADMIN — BUSPIRONE HYDROCHLORIDE 15 MG: 7.5 TABLET ORAL at 08:22

## 2024-11-04 RX ADMIN — ACETAMINOPHEN 650 MG: 325 TABLET ORAL at 13:51

## 2024-11-04 RX ADMIN — OXYCODONE 5 MG: 5 TABLET ORAL at 08:21

## 2024-11-04 RX ADMIN — OXYCODONE 10 MG: 5 TABLET ORAL at 03:20

## 2024-11-04 RX ADMIN — POLYETHYLENE GLYCOL 3350 17 G: 17 POWDER, FOR SOLUTION ORAL at 08:23

## 2024-11-04 RX ADMIN — LOSARTAN POTASSIUM 25 MG: 25 TABLET, FILM COATED ORAL at 08:22

## 2024-11-04 RX ADMIN — OXYCODONE 10 MG: 5 TABLET ORAL at 13:51

## 2024-11-04 RX ADMIN — POTASSIUM CHLORIDE 10 MEQ: 1500 TABLET, EXTENDED RELEASE ORAL at 08:22

## 2024-11-04 RX ADMIN — ACETAMINOPHEN 650 MG: 325 TABLET ORAL at 10:25

## 2024-11-04 RX ADMIN — FLUTICASONE PROPIONATE 1 SPRAY: 50 SPRAY, METERED NASAL at 08:21

## 2024-11-04 RX ADMIN — CARVEDILOL 6.25 MG: 6.25 TABLET, FILM COATED ORAL at 08:22

## 2024-11-04 RX ADMIN — ACETAMINOPHEN 650 MG: 325 TABLET ORAL at 03:21

## 2024-11-04 RX ADMIN — BUMETANIDE 1 MG: 1 TABLET ORAL at 08:22

## 2024-11-04 RX ADMIN — Medication 12.5 MG: at 08:22

## 2024-11-04 ASSESSMENT — PAIN SCALES - GENERAL
PAINLEVEL_OUTOF10: 8
PAINLEVEL_OUTOF10: 8
PAINLEVEL_OUTOF10: 5

## 2024-11-04 ASSESSMENT — PAIN DESCRIPTION - ORIENTATION: ORIENTATION: ANTERIOR;POSTERIOR

## 2024-11-04 ASSESSMENT — PAIN - FUNCTIONAL ASSESSMENT: PAIN_FUNCTIONAL_ASSESSMENT: PREVENTS OR INTERFERES SOME ACTIVE ACTIVITIES AND ADLS

## 2024-11-04 ASSESSMENT — PAIN DESCRIPTION - LOCATION
LOCATION: NECK
LOCATION: NECK

## 2024-11-04 ASSESSMENT — PAIN DESCRIPTION - PAIN TYPE: TYPE: ACUTE PAIN

## 2024-11-04 NOTE — PROGRESS NOTES
Discharge teaching and instructions for diagnosis/procedure of weakness completed with patient using teachback method. AVS reviewed. Printed prescriptions given to patient. Patient voiced understanding regarding prescriptions, follow up appointments, and care of self at home. Discharged in a wheelchair to  home with support per family

## 2024-11-04 NOTE — CARE COORDINATION
11/4/24, 12:38 PM EST    [facility] with  and family. [facility]   Patient goals/plan/ treatment preferences discussed by  and .  Patient goals/plan/ treatment preferences reviewed with patient/ family.  Patient/ family verbalize understanding of discharge plan and are in agreement with goal/plan/treatment preferences.  Understanding was demonstrated using the teach back method.  AVS provided by RN at time of discharge, which includes all necessary medical information pertaining to the patients current course of illness, treatment, post-discharge goals of care, and treatment preferences.     Services At/After Discharge: Home Health

## 2024-11-04 NOTE — PLAN OF CARE
Problem: Chronic Conditions and Co-morbidities  Goal: *Control of Pain  Description: Assess pain  11/4/2024 1039 by Sandoval Benitez, RN  Outcome: Progressing     Problem: Discharge Planning  Goal: Discharge to home or other facility with appropriate resources  11/4/2024 1039 by Sandoval Benitez, RN  Outcome: Progressing  Flowsheets (Taken 11/4/2024 1039)  Discharge to home or other facility with appropriate resources: Identify barriers to discharge with patient and caregiver     Problem: Discharge Planning  Goal: Discharge to safe environment  11/4/2024 1039 by Sandoval Benitez, RN  Outcome: Progressing

## 2024-11-04 NOTE — DISCHARGE SUMMARY
Hospital Medicine Discharge Summary      Patient Identification:   Nyasia Rinaldi   : 1985  MRN: [id number]   Account: [id number]      Patient's PCP: Michael Patel APRN - [facility]    Admit Date: 10/28/2024     Discharge Date: 2024      Admitting Physician: ELIEZER Coyne     Discharge Physician: Jimmy Roque MD     Discharge Diagnoses:  Primary HTN  Persistently hypertensive during hospitalization.  Suspect some chronic component in the setting of acute illness.   -started on losartan 25 mg daily, Bumex 1 mg daily and Coreg 6.25 mg BID for better BP control.   -encouraged to check BP regularly at home and keep a log for PCP assessment. Follow up outpatient.      Post-surgical ileus   due to spinal surgery 2 days ago. + Flatus.  -started on bowel regimen. Encourage ambulation.      Left sided weakness with associated headache   Severe Cervical Stenosis   status post C4-5 and C6-7 ACDF 24 by Dr. Pacheco POD#3  CVA ruled out.  MRI cervical spine with and without contrast 10/30/2024 revealed severe cervical stenosis at multiple levels.   -Spine team signed off. Home health orders for drain care placed per ortho team. Ok for discharge with plan for follow up outpatient in 6 weeks.   -percocet as needed for pain control and Flexeril 10 mg twice daily as needed for muscle spasm per surgery team.      Mild LVOT obstruction/ mild aortic stenosis- asymptomatic   TTE 10/29/2024 with normal EF 65%, mild LVOT obstruction at rest peak gradient at rest 11 mmHg due to BETTY.  Mild aortic stenosis with mean gradient 13 mmHg and FLACO area by continuity VTI 1.9 cm².  Mild systolic anterior motion of the anterior leaflet of mitral valve with a mild pressure gradient.  Mild TR.   -will need to follow-up outpatient with Cardiology/ PCP.     NIDDMII A1c 6.4 on 3/13/2024  At home on Trulicity once a week. Encouraged to make healthy lifestyle modifications.     Chronic BLE edema/ Suspected lymphedema   At home on lasix  20 mg daily with potassium supplement. Meds adjusted as above.    The patient was seen and examined on day of discharge and this discharge summary is in conjunction with any daily progress note from day of discharge.    Hospital Course:   Nyasia Rinaldi is a 39 y.o. female admitted to [facility] on 10/28/2024 for left sided weakness worse in upper extremity with acute onset 3-4 days PTA in the setting of headache.      CT head negative for any acute intracranial abnormalities. CTA head and neck negative for any hemodynamically significant stenosis. MRI brain with and without contrast negative for acute infarct or any abnormal enhancement. Admitted for further workup and management with concerns of TIA vs complex migraines.   MRI cervical spine with and without contrast done today revealed \"Severe spinal canal stenosis and bilateral foraminal stenosis due to a combination of degenerative changes and a slender spinal canal at the C6-7 level. Moderate to severe spinal canal stenosis with mild bilateral foraminal stenosis at the C4-5 level.\" Status post C4-5 and C6-7 ACDF on 11/1/24 by Dr. Pacheco.       Exam:     Vitals:  Vitals:    11/03/24 2307 11/04/24 0317 11/04/24 0736 11/04/24 1129   BP: (!) 154/111 (!) 161/99 (!) 146/90 131/88   Pulse: 97 98 97 (!) 101   Resp: 18 18 18 18   Temp: 98.2 °F (36.8 °C) 97.9 °F (36.6 °C) 98.2 °F (36.8 °C) 98 °F (36.7 °C)   TempSrc: Oral Oral Oral Oral   SpO2: 96% 95% 93% 96%   Weight:       Height:         Weight: Weight - Scale: 108.3 kg (238 lb 12.1 oz)     24 hour intake/output:No intake or output data in the 24 hours ending 11/07/24 0536    General appearance: No apparent distress, appears stated age and cooperative.  Acutely ill-appearing but nontoxic.  Cervical drain noted with minimal serosanguineous output.  HEENT: EOMI. Conjunctivae/corneas clear.  Neck: Limited range of motion with c-collar in place  Respiratory:  Normal respiratory effort. Clear to

## 2024-11-04 NOTE — PLAN OF CARE
Problem: Chronic Conditions and Co-morbidities  Goal: Patient's chronic conditions and co-morbidity symptoms are monitored and maintained or improved  Outcome: Progressing  Flowsheets (Taken 11/3/2024 1128 by Sandoval Benitez, RN)  Care Plan - Patient's Chronic Conditions and Co-Morbidity Symptoms are Monitored and Maintained or Improved: Monitor and assess patient's chronic conditions and comorbid symptoms for stability, deterioration, or improvement  Goal: *Control of Pain  Description: Assess pain  Outcome: Progressing     Problem: Discharge Planning  Goal: Discharge to home or other facility with appropriate resources  Outcome: Progressing  Flowsheets (Taken 11/3/2024 1128 by Sandoval Benitez, RN)  Discharge to home or other facility with appropriate resources: Identify barriers to discharge with patient and caregiver  Goal: Discharge to safe environment  Outcome: Progressing     Problem: Pain  Goal: Verbalizes/displays adequate comfort level or baseline comfort level  Outcome: Progressing  Flowsheets (Taken 11/3/2024 1128 by Sandoval Benitez, RN)  Verbalizes/displays adequate comfort level or baseline comfort level: Encourage patient to monitor pain and request assistance  Goal: Able to cope with pain  Outcome: Progressing     Problem: Safety - Adult  Goal: Free from fall injury  Outcome: Progressing  Flowsheets (Taken 10/29/2024 2136 by Dorothy Champagne RN)  Free From Fall Injury: Instruct family/caregiver on patient safety  Goal: Able to cope with pain  Description: Assess pain  Outcome: Progressing

## 2024-11-04 NOTE — CARE COORDINATION
11/04/24 1237   Condition of Participation: Discharge Planning   The Patient and/or Patient Representative was provided with a Choice of Provider? Patient   The Patient and/Or Patient Representative agree with the Discharge Plan? Yes   Freedom of Choice list was provided with basic dialogue that supports the patient's individualized plan of care/goals, treatment preferences, and shares the quality data associated with the providers?  Yes     Met with Brianne, she has no preference for  agency. Referral provided to [facility], they are able to accept and start care tomorrow.

## 2024-11-04 NOTE — PROGRESS NOTES
Department of Orthopedic Surgery  Spine Service  Attending Progress Note        Subjective:  POD#3 no new issues. Ate lasagna for dinner last night. +small BM     Vitals  VITALS:  BP (!) 161/99   Pulse 98   Temp 97.9 °F (36.6 °C) (Oral)   Resp 18   Ht 1.676 m (5' 6\")   Wt 108.3 kg (238 lb 12.1 oz)   LMP 05/27/2017   SpO2 95%   BMI 38.54 kg/m²   24HR INTAKE/OUTPUT:    Intake/Output Summary (Last 24 hours) at 11/4/2024 0711  Last data filed at 11/3/2024 1558  Gross per 24 hour   Intake 480 ml   Output 40 ml   Net 440 ml     URINARY CATHETER OUTPUT ([place]):     DRAIN/TUBE OUTPUT:  Closed/Suction Drain Left;Anterior Neck Bulb-Output (ml): 10 ml      PHYSICAL EXAM:    Orientation:  alert and oriented to person, place and time    Incision:  dressing in place, clean, dry, intact    Upper Extremity Motor :   Deltoid:  5/5  Biceps:  5/5  Triceps:  5/5  : 5/5 except 4/5 left     Upper Motor Neuron Signs:  None  Upper Extremity Sensory:  Intact C3-T1 distribution      LABS:    HgB:    Lab Results   Component Value Date/Time    HGB 14.1 11/03/2024 05:31 AM         ASSESSMENT AND PLAN:    Post operative day 3 status post C4-5 and C6-7 ACDF    1:  Monitor labs and drain output  2:  Activity Level:  as tolerated. Okay for patient to take off neck brace while in bed.   3:  Pain Control:  Ok  4:  Discharge Planning:  Pending, okay for discharge from ortho spine standpoint.  for drain care.   5:  F/u in office in 6 weeks. Pain medication sent to pharmacy.     ELIEZER Thompson

## 2024-11-05 ENCOUNTER — TELEPHONE (OUTPATIENT)
Dept: FAMILY MEDICINE CLINIC | Age: 39
End: 2024-11-05

## 2024-11-05 NOTE — TELEPHONE ENCOUNTER
Care Transitions Initial Follow Up Call    Outreach made within 2 business days of discharge: Yes    Patient: Nyasia Rinaldi Patient : 1985   MRN: [id number]  Reason for Admission: 10/29/2024  Discharge Date: 24       Spoke with: patient    Discharge department/facility: [facility] Interactive Patient Contact:  Was patient able to fill all prescriptions: Yes  Was patient instructed to bring all medications to the follow-up visit: Yes  Is patient taking all medications as directed in the discharge summary? Yes  Does patient understand their discharge instructions: Yes  Does patient have questions or concerns that need addressed prior to 7-14 day follow up office visit: no    Additional needs identified to be addressed with provider  No needs identified             Scheduled appointment with PCP within 7-14 days    Follow Up  No future appointments.    JW VILLEDA LPN

## 2024-12-02 ENCOUNTER — OFFICE VISIT (OUTPATIENT)
Dept: FAMILY MEDICINE CLINIC | Age: 39
End: 2024-12-02

## 2024-12-02 VITALS
WEIGHT: 208.1 LBS | BODY MASS INDEX: 33.59 KG/M2 | SYSTOLIC BLOOD PRESSURE: 128 MMHG | HEART RATE: 56 BPM | RESPIRATION RATE: 16 BRPM | DIASTOLIC BLOOD PRESSURE: 80 MMHG

## 2024-12-02 DIAGNOSIS — I10 ESSENTIAL HYPERTENSION: ICD-10-CM

## 2024-12-02 DIAGNOSIS — R60.9 WATER RETENTION: ICD-10-CM

## 2024-12-02 DIAGNOSIS — Z09 HOSPITAL DISCHARGE FOLLOW-UP: Primary | ICD-10-CM

## 2024-12-02 DIAGNOSIS — E78.2 MIXED HYPERLIPIDEMIA: ICD-10-CM

## 2024-12-02 DIAGNOSIS — E11.9 TYPE 2 DIABETES MELLITUS WITHOUT COMPLICATION, WITHOUT LONG-TERM CURRENT USE OF INSULIN (HCC): ICD-10-CM

## 2024-12-02 DIAGNOSIS — R53.1 ACUTE LEFT-SIDED WEAKNESS: ICD-10-CM

## 2024-12-02 DIAGNOSIS — Z98.1 S/P CERVICAL SPINAL FUSION: ICD-10-CM

## 2024-12-02 DIAGNOSIS — M48.02 CERVICAL STENOSIS OF SPINAL CANAL: ICD-10-CM

## 2024-12-02 RX ORDER — ATORVASTATIN CALCIUM 40 MG/1
40 TABLET, FILM COATED ORAL NIGHTLY
Qty: 90 TABLET | Refills: 1 | Status: SHIPPED | OUTPATIENT
Start: 2024-12-02 | End: 2025-05-31

## 2024-12-02 RX ORDER — LOSARTAN POTASSIUM 25 MG/1
25 TABLET ORAL DAILY
Qty: 90 TABLET | Refills: 1 | Status: SHIPPED | OUTPATIENT
Start: 2024-12-02 | End: 2025-05-31

## 2024-12-02 RX ORDER — POTASSIUM CHLORIDE 750 MG/1
10 TABLET, EXTENDED RELEASE ORAL DAILY
Qty: 90 TABLET | Refills: 1 | Status: SHIPPED | OUTPATIENT
Start: 2024-12-02

## 2024-12-02 RX ORDER — BUMETANIDE 1 MG/1
1 TABLET ORAL DAILY
Qty: 90 TABLET | Refills: 1 | Status: SHIPPED | OUTPATIENT
Start: 2024-12-02 | End: 2025-05-31

## 2024-12-02 RX ORDER — CARVEDILOL 6.25 MG/1
6.25 TABLET ORAL 2 TIMES DAILY WITH MEALS
Qty: 180 TABLET | Refills: 1 | Status: SHIPPED | OUTPATIENT
Start: 2024-12-02 | End: 2025-05-31

## 2024-12-02 ASSESSMENT — ENCOUNTER SYMPTOMS
ABDOMINAL PAIN: 0
COUGH: 0
SHORTNESS OF BREATH: 0
NAUSEA: 0

## 2025-01-02 DIAGNOSIS — Z47.89 ENCOUNTER FOR OTHER ORTHOPEDIC AFTERCARE: ICD-10-CM

## 2025-01-03 RX ORDER — GABAPENTIN 300 MG/1
CAPSULE ORAL
Qty: 90 CAPSULE | Refills: 0 | OUTPATIENT
Start: 2025-01-03

## 2025-01-24 ENCOUNTER — PATIENT MESSAGE (OUTPATIENT)
Dept: FAMILY MEDICINE CLINIC | Age: 40
End: 2025-01-24

## 2025-01-24 DIAGNOSIS — E11.9 TYPE 2 DIABETES MELLITUS WITHOUT COMPLICATION, WITHOUT LONG-TERM CURRENT USE OF INSULIN (HCC): Primary | ICD-10-CM

## 2025-01-24 RX ORDER — TIRZEPATIDE 2.5 MG/.5ML
2.5 INJECTION, SOLUTION SUBCUTANEOUS WEEKLY
Qty: 2 ML | Refills: 0 | Status: SHIPPED | OUTPATIENT
Start: 2025-01-24

## 2025-01-27 ENCOUNTER — TELEPHONE (OUTPATIENT)
Dept: FAMILY MEDICINE CLINIC | Age: 40
End: 2025-01-27

## 2025-01-27 DIAGNOSIS — F41.1 GAD (GENERALIZED ANXIETY DISORDER): ICD-10-CM

## 2025-01-27 RX ORDER — BUSPIRONE HYDROCHLORIDE 15 MG/1
15 TABLET ORAL DAILY
Qty: 90 TABLET | Refills: 3 | Status: SHIPPED | OUTPATIENT
Start: 2025-01-27 | End: 2026-01-22

## 2025-01-27 NOTE — TELEPHONE ENCOUNTER
Outcome  Approved today by [employer] 2017 [facility]  Request Reference Number: [id number]. MOUNJARO INJ 2.5/0.5 is approved through 01/27/2026. Your patient may now fill this prescription and it will be covered.  Authorization Expiration Date: 1/27/2026

## 2025-01-27 NOTE — TELEPHONE ENCOUNTER
Called pharmacy and left a message with approval.   Called patient and informed she verbalized understanding.     Patient wanted to know if you would want her to continue buspar. She stated that she is almost out. She has about 1 week supply left.   If she is to continue it she will needs a prescription sent to [facility].

## 2025-02-19 ENCOUNTER — PATIENT MESSAGE (OUTPATIENT)
Dept: FAMILY MEDICINE CLINIC | Age: 40
End: 2025-02-19

## 2025-02-19 DIAGNOSIS — N76.0 ACUTE VAGINITIS: Primary | ICD-10-CM

## 2025-02-19 DIAGNOSIS — E11.9 TYPE 2 DIABETES MELLITUS WITHOUT COMPLICATION, WITHOUT LONG-TERM CURRENT USE OF INSULIN (HCC): ICD-10-CM

## 2025-02-20 RX ORDER — FLUCONAZOLE 150 MG/1
150 TABLET ORAL ONCE
Qty: 2 TABLET | Refills: 0 | Status: SHIPPED | OUTPATIENT
Start: 2025-02-20 | End: 2025-02-20

## 2025-02-21 ENCOUNTER — TELEPHONE (OUTPATIENT)
Dept: FAMILY MEDICINE CLINIC | Age: 40
End: 2025-02-21

## 2025-02-24 NOTE — TELEPHONE ENCOUNTER
Called and [facility] and left them a detailed msg updating them on approval.  Called and left a VM updating the patient.

## 2025-02-24 NOTE — TELEPHONE ENCOUNTER
Outcome  Approved on February 21 by [employer]  Request Reference Number: [id number]. TRULICITY INJ 0.75/0.5 is approved through 02/21/2026. Your patient may now fill this prescription and it will be covered.  Effective Date: 2/21/2025  Authorization Expiration Date: 2/21/2026

## 2025-02-28 ENCOUNTER — HOSPITAL ENCOUNTER (EMERGENCY)
Age: 40
Discharge: HOME | End: 2025-02-28
Attending: EMERGENCY MEDICINE
Payer: COMMERCIAL

## 2025-02-28 ENCOUNTER — APPOINTMENT (OUTPATIENT)
Dept: MRI IMAGING | Age: 40
End: 2025-02-28
Payer: COMMERCIAL

## 2025-02-28 ENCOUNTER — APPOINTMENT (OUTPATIENT)
Dept: GENERAL RADIOLOGY | Age: 40
End: 2025-02-28
Payer: COMMERCIAL

## 2025-02-28 ENCOUNTER — APPOINTMENT (OUTPATIENT)
Dept: CT IMAGING | Age: 40
End: 2025-02-28
Payer: COMMERCIAL

## 2025-02-28 VITALS
HEART RATE: 88 BPM | HEIGHT: 66 IN | OXYGEN SATURATION: 94 % | RESPIRATION RATE: 15 BRPM | DIASTOLIC BLOOD PRESSURE: 98 MMHG | TEMPERATURE: 98.3 F | BODY MASS INDEX: 34.07 KG/M2 | WEIGHT: 212 LBS | SYSTOLIC BLOOD PRESSURE: 145 MMHG

## 2025-02-28 DIAGNOSIS — R55 SYNCOPE AND COLLAPSE: ICD-10-CM

## 2025-02-28 DIAGNOSIS — G43.909 MIGRAINE WITHOUT STATUS MIGRAINOSUS, NOT INTRACTABLE, UNSPECIFIED MIGRAINE TYPE: Primary | ICD-10-CM

## 2025-02-28 LAB
ALBUMIN SERPL BCG-MCNC: 3.9 G/DL (ref 3.4–4.9)
ALP SERPL-CCNC: 82 U/L (ref 35–104)
ALT SERPL W/O P-5'-P-CCNC: 74 U/L (ref 10–35)
ANION GAP SERPL CALC-SCNC: 11 MEQ/L (ref 8–16)
APTT PPP: 27.5 SECONDS (ref 22–38)
AST SERPL-CCNC: 52 U/L (ref 10–35)
BASOPHILS ABSOLUTE: 0.1 THOU/MM3 (ref 0–0.1)
BASOPHILS NFR BLD AUTO: 0.7 %
BILIRUB SERPL-MCNC: 0.3 MG/DL (ref 0.3–1.2)
BUN SERPL-MCNC: 7 MG/DL (ref 8–23)
CALCIUM SERPL-MCNC: 9.1 MG/DL (ref 8.6–10)
CHLORIDE SERPL-SCNC: 102 MEQ/L (ref 98–111)
CO2 SERPL-SCNC: 24 MEQ/L (ref 22–29)
CREAT SERPL-MCNC: 0.8 MG/DL (ref 0.5–0.9)
DEPRECATED RDW RBC AUTO: 39.3 FL (ref 35–45)
EKG ATRIAL RATE: 90 BPM
EKG P AXIS: 65 DEGREES
EKG P-R INTERVAL: 134 MS
EKG Q-T INTERVAL: 362 MS
EKG QRS DURATION: 84 MS
EKG QTC CALCULATION (BAZETT): 442 MS
EKG R AXIS: 91 DEGREES
EKG T AXIS: 58 DEGREES
EKG VENTRICULAR RATE: 90 BPM
EOSINOPHIL NFR BLD AUTO: 1.4 %
EOSINOPHILS ABSOLUTE: 0.1 THOU/MM3 (ref 0–0.4)
ERYTHROCYTE [DISTWIDTH] IN BLOOD BY AUTOMATED COUNT: 12.6 % (ref 11.5–14.5)
GFR SERPL CREATININE-BSD FRML MDRD: > 90 ML/MIN/1.73M2
GLUCOSE BLD STRIP.AUTO-MCNC: 160 MG/DL (ref 70–108)
GLUCOSE SERPL-MCNC: 173 MG/DL (ref 74–109)
HCT VFR BLD AUTO: 44.2 % (ref 37–47)
HGB BLD-MCNC: 14.5 GM/DL (ref 12–16)
IMM GRANULOCYTES # BLD AUTO: 0.05 THOU/MM3 (ref 0–0.07)
IMM GRANULOCYTES NFR BLD AUTO: 0.6 %
INR PPP: 1.03 (ref 0.85–1.13)
LYMPHOCYTES ABSOLUTE: 2.5 THOU/MM3 (ref 1–4.8)
LYMPHOCYTES NFR BLD AUTO: 27.6 %
MCH RBC QN AUTO: 28.4 PG (ref 26–33)
MCHC RBC AUTO-ENTMCNC: 32.8 GM/DL (ref 32.2–35.5)
MCV RBC AUTO: 86.5 FL (ref 81–99)
MONOCYTES ABSOLUTE: 0.6 THOU/MM3 (ref 0.4–1.3)
MONOCYTES NFR BLD AUTO: 6.8 %
NEUTROPHILS ABSOLUTE: 5.7 THOU/MM3 (ref 1.8–7.7)
NEUTROPHILS NFR BLD AUTO: 62.9 %
NRBC BLD AUTO-RTO: 0 /100 WBC
OSMOLALITY SERPL CALC.SUM OF ELEC: 275.9 MOSMOL/KG (ref 275–300)
PLATELET # BLD AUTO: 216 THOU/MM3 (ref 130–400)
PMV BLD AUTO: 9.3 FL (ref 9.4–12.4)
POC CREATININE WHOLE BLOOD: 0.7 MG/DL (ref 0.5–1.2)
POTASSIUM SERPL-SCNC: 4.4 MEQ/L (ref 3.5–5.2)
PROT SERPL-MCNC: 7.1 G/DL (ref 6.4–8.3)
RBC # BLD AUTO: 5.11 MILL/MM3 (ref 4.2–5.4)
SODIUM SERPL-SCNC: 137 MEQ/L (ref 135–145)
TROPONIN, HIGH SENSITIVITY: < 6 NG/L (ref 0–12)
WBC # BLD AUTO: 9.1 THOU/MM3 (ref 4.8–10.8)

## 2025-02-28 PROCEDURE — 84484 ASSAY OF TROPONIN QUANT: CPT

## 2025-02-28 PROCEDURE — 85025 COMPLETE CBC W/AUTO DIFF WBC: CPT

## 2025-02-28 PROCEDURE — 93005 ELECTROCARDIOGRAM TRACING: CPT | Performed by: EMERGENCY MEDICINE

## 2025-02-28 PROCEDURE — 96361 HYDRATE IV INFUSION ADD-ON: CPT

## 2025-02-28 PROCEDURE — 93010 ELECTROCARDIOGRAM REPORT: CPT | Performed by: NUCLEAR MEDICINE

## 2025-02-28 PROCEDURE — 6360000002 HC RX W HCPCS: Performed by: EMERGENCY MEDICINE

## 2025-02-28 PROCEDURE — 70450 CT HEAD/BRAIN W/O DYE: CPT

## 2025-02-28 PROCEDURE — 71045 X-RAY EXAM CHEST 1 VIEW: CPT

## 2025-02-28 PROCEDURE — 85610 PROTHROMBIN TIME: CPT

## 2025-02-28 PROCEDURE — 2580000003 HC RX 258: Performed by: EMERGENCY MEDICINE

## 2025-02-28 PROCEDURE — 6360000004 HC RX CONTRAST MEDICATION: Performed by: EMERGENCY MEDICINE

## 2025-02-28 PROCEDURE — 99285 EMERGENCY DEPT VISIT HI MDM: CPT

## 2025-02-28 PROCEDURE — 82565 ASSAY OF CREATININE: CPT

## 2025-02-28 PROCEDURE — 82948 REAGENT STRIP/BLOOD GLUCOSE: CPT

## 2025-02-28 PROCEDURE — 80053 COMPREHEN METABOLIC PANEL: CPT

## 2025-02-28 PROCEDURE — 70498 CT ANGIOGRAPHY NECK: CPT

## 2025-02-28 PROCEDURE — 70551 MRI BRAIN STEM W/O DYE: CPT

## 2025-02-28 PROCEDURE — 96375 TX/PRO/DX INJ NEW DRUG ADDON: CPT

## 2025-02-28 PROCEDURE — 71260 CT THORAX DX C+: CPT

## 2025-02-28 PROCEDURE — 36415 COLL VENOUS BLD VENIPUNCTURE: CPT

## 2025-02-28 PROCEDURE — 85730 THROMBOPLASTIN TIME PARTIAL: CPT

## 2025-02-28 PROCEDURE — 70496 CT ANGIOGRAPHY HEAD: CPT

## 2025-02-28 PROCEDURE — 96374 THER/PROPH/DIAG INJ IV PUSH: CPT

## 2025-02-28 RX ORDER — METOCLOPRAMIDE HYDROCHLORIDE 5 MG/ML
10 INJECTION INTRAMUSCULAR; INTRAVENOUS ONCE
Status: COMPLETED | OUTPATIENT
Start: 2025-02-28 | End: 2025-02-28

## 2025-02-28 RX ORDER — 0.9 % SODIUM CHLORIDE 0.9 %
1000 INTRAVENOUS SOLUTION INTRAVENOUS ONCE
Status: COMPLETED | OUTPATIENT
Start: 2025-02-28 | End: 2025-02-28

## 2025-02-28 RX ORDER — DIPHENHYDRAMINE HYDROCHLORIDE 50 MG/ML
25 INJECTION INTRAMUSCULAR; INTRAVENOUS ONCE
Status: COMPLETED | OUTPATIENT
Start: 2025-02-28 | End: 2025-02-28

## 2025-02-28 RX ORDER — KETOROLAC TROMETHAMINE 10 MG/1
10 TABLET, FILM COATED ORAL EVERY 6 HOURS PRN
Qty: 20 TABLET | Refills: 0 | Status: SHIPPED | OUTPATIENT
Start: 2025-02-28

## 2025-02-28 RX ORDER — KETOROLAC TROMETHAMINE 30 MG/ML
15 INJECTION, SOLUTION INTRAMUSCULAR; INTRAVENOUS ONCE
Status: COMPLETED | OUTPATIENT
Start: 2025-02-28 | End: 2025-02-28

## 2025-02-28 RX ORDER — IOPAMIDOL 755 MG/ML
80 INJECTION, SOLUTION INTRAVASCULAR
Status: COMPLETED | OUTPATIENT
Start: 2025-02-28 | End: 2025-02-28

## 2025-02-28 RX ADMIN — SODIUM CHLORIDE 1000 ML: 9 INJECTION, SOLUTION INTRAVENOUS at 12:56

## 2025-02-28 RX ADMIN — METOCLOPRAMIDE HYDROCHLORIDE 10 MG: 5 INJECTION, SOLUTION INTRAMUSCULAR; INTRAVENOUS at 12:11

## 2025-02-28 RX ADMIN — DIPHENHYDRAMINE HYDROCHLORIDE 25 MG: 50 INJECTION INTRAMUSCULAR; INTRAVENOUS at 12:11

## 2025-02-28 RX ADMIN — IOPAMIDOL 80 ML: 755 INJECTION, SOLUTION INTRAVENOUS at 11:24

## 2025-02-28 RX ADMIN — KETOROLAC TROMETHAMINE 15 MG: 30 INJECTION, SOLUTION INTRAMUSCULAR at 14:15

## 2025-02-28 ASSESSMENT — PAIN SCALES - GENERAL
PAINLEVEL_OUTOF10: 8
PAINLEVEL_OUTOF10: 10
PAINLEVEL_OUTOF10: 10
PAINLEVEL_OUTOF10: 9
PAINLEVEL_OUTOF10: 7

## 2025-02-28 ASSESSMENT — PAIN DESCRIPTION - LOCATION
LOCATION: HEAD

## 2025-02-28 ASSESSMENT — PAIN DESCRIPTION - DESCRIPTORS: DESCRIPTORS: ACHING

## 2025-02-28 ASSESSMENT — PAIN - FUNCTIONAL ASSESSMENT: PAIN_FUNCTIONAL_ASSESSMENT: 0-10

## 2025-02-28 NOTE — ED NOTES
This RN assuming care. Dr. Carballo at bedside states to page code stroke. LKW 11pm 2/27. POCT glucose 160.

## 2025-02-28 NOTE — CONSULTS
Dr. Bhat, who is in agreement with assessment and plan.     Last known well:  2300 last evening  Time of stroke alert:  1101  Time of neurology arrival:  1104  Vascular risk factors:  HTN, HLD, DM  Initial glucose: 160  Old deficits from prior stroke:  NA  INR in ED if anticoagulated:  not applicable.  Initial blood pressure:  160/105  Initial NIHSS: 1  Pre-morbid Modified Wendel Scale:  1  Time of initial imaging read:  1110  Thrombolytic administered:  not indicated/contraindicated.  Thrombectomy performed:  not indicated.  Puncture time:  not applicable.    Of note, patient previously evaluated by our service for similar symptoms 10/29/24 and felt that symptoms were likely secondary to cervical spine pathology, as MRI revealed severe spinal canal stenosis and bilateral foraminal stenosis at C6-7, moderate to severe spinal canal stenosis with mild bilateral foraminal stenosis at C4-5. Neuro imaging was negative at that time, including CTH WO, CTA head & neck, and MRI brain W/WO.      Review of Systems   Review of Systems   Unable to perform ROS: Acuity of condition   Eyes:  Positive for visual disturbance.   Neurological:  Positive for syncope, numbness and headaches. Negative for tremors, facial asymmetry, speech difficulty and weakness.   Psychiatric/Behavioral:  Negative for confusion.      Medications   Scheduled Meds:   Continuous Infusions:   PRN Meds:   Medications Prior to Admission:   No current facility-administered medications on file prior to encounter.     Current Outpatient Medications on File Prior to Encounter   Medication Sig Dispense Refill    dulaglutide (TRULICITY) 0.75 MG/0.5ML SOAJ SC injection Inject 0.5 mLs into the skin every 7 days 2 mL 0    busPIRone (BUSPAR) 15 MG tablet Take 15 mg by mouth daily 90 tablet 3    atorvastatin (LIPITOR) 40 MG tablet Take 1 tablet by mouth nightly 90 tablet 1    bumetanide (BUMEX) 1 MG tablet Take 1 tablet by mouth daily 90 tablet 1    carvedilol (COREG)  time.    This patient was seen and evaluated with Dr. Bhat and he is in agreement with the assessment and plan.    Electronically signed by Damir Whalen PA-C on 2/28/25 at 2:49 PM EST

## 2025-02-28 NOTE — ED PROVIDER NOTES
[facility] EMERGENCY DEPARTMENT      EMERGENCY MEDICINE     Pt Name: Nyasia Rinaldi  MRN: [id number]  Birthdate 1985  Date of evaluation: 2/28/2025  Provider: Lam Carballo DO  Supervising Physician: Connor Castillo MD    CHIEF COMPLAINT       Chief Complaint   Patient presents with    Loss of Consciousness    Headache     HISTORY OF PRESENT ILLNESS   Nyasia Rinaldi is a 40 y.o. female with a h/o headaches, HTN, DM who presents to the emergency department from home for evaluation of syncope, headache, blurry vision. Pt went to sleep feeling well around 11:00 PM. She woke up a couple of times in the middle of the to urinate, felt fine, does not know when she was up. Woke up at 7:30 with headache and blurry vision in the left lower outer field. This is a new sx for her but she reports feeling similarly a few months when she was worked up as a code stroke. Pt denies weakness, numbness.  Around 9:00 she had her arms above her head and turned quickly and felt lightheaded, her vision went black and she passed out.  Patient reports a similar episode occurring in the past and notes that she gets lightheaded when she raises her arms above her head.  Patient denies any dyspnea, chest pain.    PASTMEDICAL HISTORY     Past Medical History:   Diagnosis Date    Asthma     Depression     Diabetes mellitus (HCC)     Hemorrhoids     Hyperlipidemia     Hypertension     Intractable headache 10/30/2024       Patient Active Problem List   Diagnosis Code    Asthma with bronchitis J45.909    Diabetes mellitus, type 2 (HCC) E11.9    Primary hypertension I10    Left-sided weakness R53.1    Intermittent asthma without complication J45.20    Cervical stenosis of spinal canal M48.02    Intractable headache R51.9    Acute left-sided weakness R53.1     SURGICAL HISTORY       Past Surgical History:   Procedure Laterality Date    CERVICAL FUSION N/A 11/1/2024    CERVICAL LAMINECTOMY FUSION ANTERIOR C4-C5, C6-C7 performed by [facility]

## 2025-02-28 NOTE — ED PROVIDER NOTES
[facility]  EMERGENCY MEDICINE ATTENDING ATTESTATION      Evaluation of Nyasia Rinaldi.   Case discussed and care plan developed with resident physician.   I agree with the resident physician documentation and plan as documented by him, except if my documentation differs.   Patient seen, interviewed and examined by me.  I reviewed the medical, surgical, family and social history, medications and allergies.   I have reviewed and interpreted all available lab, radiology and ekg results available at the moment.  I have reviewed the nursing documentation.     Please see the resident physician completed note for final disposition except as documented on this attestation.   I have reviewed and interpreted all available lab, radiology and ekg results available at the moment.  Diagnosis, treatment and disposition plans were discussed and agreed upon by patient.   This transcription was electronically signed. It was dictated by use of voice recognition software and electronically transcribed. The transcription may contain errors not detected in proofreading.     I performed direct supervision and was present for the critical portion following procedures: None  Critical care time on this case: See critical care addendum below    CRITICAL CARE ADDENDUM:  This patient was identified as critically ill on my evaluation due to possible stroke versus complex migraine, requiring stroke team activation, stat imaging, in addition to discussions with consultants, evaluation of patient's response to treatment, examination of patient, obtaining history from patient or surrogate, ordering and performing treatments and interventions, ordering and review of laboratory studies, ordering and review of radiographic studies, pulse oximetry, re-evaluation of patient's condition and review of old charts.  There is a high probability of imminent or life-threatening deterioration in the patient’s condition.  A total

## 2025-02-28 NOTE — DISCHARGE INSTRUCTIONS
You are seen here today for a headache and an episode of passing out.  Take Toradol as needed for your headache.  Follow-up with both your cardiologist and with the neurologist within next 2 days.  Return here if develop any worsening headache, difficulty speaking, change in vision, difficulty moving arms or legs, chest pain, difficulty breathing.

## 2025-02-28 NOTE — ED NOTES
Pt presents to the ED after having syncopal episode today. Pt states since she passed out she's had a HA and L eye is blurry. Pt NIH 0. Pt reports hx of migraines.

## 2025-03-03 ENCOUNTER — TELEPHONE (OUTPATIENT)
Dept: FAMILY MEDICINE CLINIC | Age: 40
End: 2025-03-03

## 2025-03-05 ENCOUNTER — OFFICE VISIT (OUTPATIENT)
Dept: CARDIOLOGY CLINIC | Age: 40
End: 2025-03-05
Payer: COMMERCIAL

## 2025-03-05 VITALS
HEART RATE: 103 BPM | SYSTOLIC BLOOD PRESSURE: 155 MMHG | DIASTOLIC BLOOD PRESSURE: 96 MMHG | WEIGHT: 217.4 LBS | BODY MASS INDEX: 34.94 KG/M2 | HEIGHT: 66 IN

## 2025-03-05 DIAGNOSIS — I10 PRIMARY HYPERTENSION: ICD-10-CM

## 2025-03-05 DIAGNOSIS — Z87.898 HISTORY OF SYNCOPE: ICD-10-CM

## 2025-03-05 DIAGNOSIS — R60.0 BILATERAL LEG EDEMA: ICD-10-CM

## 2025-03-05 DIAGNOSIS — R07.89 CHEST PAIN, ATYPICAL: Primary | ICD-10-CM

## 2025-03-05 PROCEDURE — G8427 DOCREV CUR MEDS BY ELIG CLIN: HCPCS | Performed by: INTERNAL MEDICINE

## 2025-03-05 PROCEDURE — 4004F PT TOBACCO SCREEN RCVD TLK: CPT | Performed by: INTERNAL MEDICINE

## 2025-03-05 PROCEDURE — G8417 CALC BMI ABV UP PARAM F/U: HCPCS | Performed by: INTERNAL MEDICINE

## 2025-03-05 PROCEDURE — 3080F DIAST BP >= 90 MM HG: CPT | Performed by: INTERNAL MEDICINE

## 2025-03-05 PROCEDURE — 3077F SYST BP >= 140 MM HG: CPT | Performed by: INTERNAL MEDICINE

## 2025-03-05 PROCEDURE — 99204 OFFICE O/P NEW MOD 45 MIN: CPT | Performed by: INTERNAL MEDICINE

## 2025-03-05 NOTE — PROGRESS NOTES
Chief Complaint   Patient presents with    Establish Cardiologist    New Patient    Follow-Up from Hospital     NP here - was in ER due to chest pain - pt states she blacked out    EKG done 25    Prodrome dizziness  Was in bath room   Blurring of vision  Then syncope 2025  Postop syncope-headache and fatigue  Got up by herself    Hx of similar syncope oct 2024 and had neck surgery after    Intermittent dizziness on standing , supine and sitting and more on turning or rolling over      Gave Hx pf chest pain 2024 no associated  symptom after syncope and resolve in few min  No recurrence of cp    Denied sob, palpitation of edema     ORTHOSTATIC BP DONE TODAY    SUPINE              SITTING          STANDING  160/94 101        164/98 102       155/96 103    Vap    FHx  Mother had HTN    Drink 30 to 40 oz    PMHX  On bumex 1 mg  po for the last 1 yr for wt reason and considered some fliud retention          Past Surgical History:   Procedure Laterality Date    CERVICAL FUSION N/A 2024    CERVICAL LAMINECTOMY FUSION ANTERIOR C4-C5, C6-C7 performed by Ladan Pacheco MD at [facility] OR     SECTION      COLONOSCOPY  2011    COLONOSCOPY Left 2019    COLONOSCOPY performed by Neal Leon MD at [facility] OR    HEMORRHOID SURGERY N/A 2019    EXCISIONAL HEMORRHOIDECTOMY performed by Amari Parkinson MD at [facility] OR    HYSTERECTOMY (CERVIX STATUS UNKNOWN)  2017    ROBOTIC LAPAROSCOPIC WITH BS    TONSILLECTOMY      as a child    TYMPANOSTOMY TUBE PLACEMENT      as a child-multiple times    TYMPANOSTOMY TUBE PLACEMENT         Allergies   Allergen Reactions    Latex Hives        Family History   Problem Relation Age of Onset    High Blood Pressure Mother     Other Father         diverticulitis    Heart Disease Maternal Grandmother     Esophageal Cancer Paternal Grandmother     Dementia Paternal Grandfather     No Known Problems Sister     No Known Problems Brother     No

## 2025-03-18 ENCOUNTER — RESULTS FOLLOW-UP (OUTPATIENT)
Dept: FAMILY MEDICINE CLINIC | Age: 40
End: 2025-03-18

## 2025-03-18 LAB
A/G RATIO: 1.3 (ref 1.5–2.5)
ALBUMIN: 4 G/DL (ref 3.5–5)
ALP BLD-CCNC: 76 IU/L (ref 39–118)
ALT SERPL-CCNC: 49 IU/L (ref 10–40)
ANION GAP SERPL CALCULATED.3IONS-SCNC: 7 MMOL/L (ref 4–12)
AST SERPL-CCNC: 30 IU/L (ref 15–41)
BASOPHILS ABSOLUTE: 100 /CMM (ref 0–200)
BASOPHILS RELATIVE PERCENT: 0.9 % (ref 0–2)
BILIRUB SERPL-MCNC: 0.5 MG/DL (ref 0.2–1)
BUN BLDV-MCNC: 11 MG/DL (ref 7–20)
CALCIUM SERPL-MCNC: 8.9 MG/DL (ref 8.8–10.5)
CHLORIDE BLD-SCNC: 102 MEQ/L (ref 101–111)
CHOLESTEROL, TOTAL: 148 MG/DL
CHOLESTEROL/HDL RELATIVE RISK: 3.6 (ref 4–4.4)
CO2: 28 MEQ/L (ref 21–32)
CREAT SERPL-MCNC: 0.72 MG/DL (ref 0.6–1.3)
CREATININE CLEARANCE: >60
DIRECT-LDL / HDL RISK: 2.3
EOSINOPHILS ABSOLUTE: 100 /CMM (ref 0–500)
EOSINOPHILS RELATIVE PERCENT: 1.3 % (ref 0–6)
ESTIMATED AVERAGE GLUCOSE: 220 MG/DL
GLUCOSE: 280 MG/DL (ref 70–110)
HBA1C MFR BLD: 9.3 % (ref 4.4–6.4)
HCT VFR BLD CALC: 43.4 % (ref 35–44)
HDLC SERPL-MCNC: 41 MG/DL
HEMOGLOBIN: 15.1 GM/DL (ref 12–15)
LDL CHOLESTEROL DIRECT: 96 MG/DL
LYMPHOCYTES ABSOLUTE: 2400 /CMM (ref 1000–4800)
LYMPHOCYTES RELATIVE PERCENT: 24.8 % (ref 15–45)
MCH RBC QN AUTO: 29.2 PG (ref 27.5–33)
MCHC RBC AUTO-ENTMCNC: 34.7 GM/DL (ref 33–36)
MCV RBC AUTO: 84 CU MIC (ref 80–97)
MONOCYTES ABSOLUTE: 600 /CMM (ref 0–800)
MONOCYTES RELATIVE PERCENT: 6.4 % (ref 2–10)
NEUTROPHILS ABSOLUTE: 6300 /CMM (ref 1800–7700)
NEUTROPHILS RELATIVE PERCENT: 66.6 % (ref 40–70)
NUCLEATED RBCS: 0 /100 WBC
PDW BLD-RTO: 13.6 % (ref 12–16)
PLATELET # BLD: 220 TH/CMM (ref 150–400)
POTASSIUM SERPL-SCNC: 4 MEQ/L (ref 3.6–5)
RBC # BLD: 5.17 MIL/CMM (ref 4–5.1)
SODIUM BLD-SCNC: 137 MEQ/L (ref 135–145)
TOTAL PROTEIN: 7.1 G/DL (ref 6.2–8)
TRIGL SERPL-MCNC: 144 MG/DL
VLDLC SERPL CALC-MCNC: 28 MG/DL
WBC # BLD: 9.5 TH/CMM (ref 4.4–10.5)

## 2025-03-19 ENCOUNTER — OFFICE VISIT (OUTPATIENT)
Dept: NEUROLOGY | Age: 40
End: 2025-03-19
Payer: COMMERCIAL

## 2025-03-19 VITALS
DIASTOLIC BLOOD PRESSURE: 85 MMHG | SYSTOLIC BLOOD PRESSURE: 130 MMHG | HEART RATE: 100 BPM | WEIGHT: 216 LBS | OXYGEN SATURATION: 97 % | BODY MASS INDEX: 34.72 KG/M2 | HEIGHT: 66 IN

## 2025-03-19 DIAGNOSIS — G44.89 OTHER HEADACHE SYNDROME: ICD-10-CM

## 2025-03-19 DIAGNOSIS — G43.109 COMPLICATED MIGRAINE: Primary | ICD-10-CM

## 2025-03-19 PROCEDURE — G8427 DOCREV CUR MEDS BY ELIG CLIN: HCPCS | Performed by: PSYCHIATRY & NEUROLOGY

## 2025-03-19 PROCEDURE — 4004F PT TOBACCO SCREEN RCVD TLK: CPT | Performed by: PSYCHIATRY & NEUROLOGY

## 2025-03-19 PROCEDURE — 99205 OFFICE O/P NEW HI 60 MIN: CPT | Performed by: PSYCHIATRY & NEUROLOGY

## 2025-03-19 PROCEDURE — 3079F DIAST BP 80-89 MM HG: CPT | Performed by: PSYCHIATRY & NEUROLOGY

## 2025-03-19 PROCEDURE — G8417 CALC BMI ABV UP PARAM F/U: HCPCS | Performed by: PSYCHIATRY & NEUROLOGY

## 2025-03-19 PROCEDURE — 3075F SYST BP GE 130 - 139MM HG: CPT | Performed by: PSYCHIATRY & NEUROLOGY

## 2025-03-19 RX ORDER — TOPIRAMATE 50 MG/1
TABLET, FILM COATED ORAL
Qty: 30 TABLET | Refills: 3 | Status: SHIPPED | OUTPATIENT
Start: 2025-03-19

## 2025-03-19 RX ORDER — UBROGEPANT 50 MG/1
50 TABLET ORAL DAILY PRN
Qty: 2 TABLET | Refills: 0 | Status: SHIPPED | COMMUNITY
Start: 2025-03-19 | End: 2025-04-30

## 2025-03-19 NOTE — PATIENT INSTRUCTIONS
EEG  Start Topamax 25 mg tablet daily for one week then 50 mg daily there after. Take with plenty of fluids.   Start Ubrelvy 50 mg as needed for breakthrough headache, samples provided today in office  Referral to sleep medicine to screen for underlying sleep disorder  Keep headache diary  Call with any new symptoms or concerns.   Follow up in 2 months.

## 2025-03-26 ENCOUNTER — HOSPITAL ENCOUNTER (OUTPATIENT)
Dept: NUCLEAR MEDICINE | Age: 40
Discharge: HOME | End: 2025-03-26
Attending: INTERNAL MEDICINE
Payer: COMMERCIAL

## 2025-03-26 ENCOUNTER — HOSPITAL ENCOUNTER (OUTPATIENT)
Age: 40
Discharge: HOME | End: 2025-03-28
Attending: INTERNAL MEDICINE
Payer: COMMERCIAL

## 2025-03-26 VITALS — WEIGHT: 213 LBS | BODY MASS INDEX: 34.38 KG/M2

## 2025-03-26 DIAGNOSIS — R07.89 CHEST PAIN, ATYPICAL: ICD-10-CM

## 2025-03-26 DIAGNOSIS — R60.0 BILATERAL LEG EDEMA: ICD-10-CM

## 2025-03-26 DIAGNOSIS — I10 PRIMARY HYPERTENSION: ICD-10-CM

## 2025-03-26 DIAGNOSIS — Z87.898 HISTORY OF SYNCOPE: ICD-10-CM

## 2025-03-26 LAB
NUC STRESS EJECTION FRACTION: 56 %
STRESS BASELINE DIAS BP: 92 MMHG
STRESS BASELINE HR: 88 BPM
STRESS BASELINE ST DEPRESSION: 0 MM
STRESS BASELINE SYS BP: 122 MMHG
STRESS ESTIMATED WORKLOAD: 1 METS
STRESS PEAK DIAS BP: 100 MMHG
STRESS PEAK SYS BP: 144 MMHG
STRESS PERCENT HR ACHIEVED: 56 %
STRESS POST PEAK HR: 100 BPM
STRESS RATE PRESSURE PRODUCT: NORMAL BPM*MMHG
STRESS ST DEPRESSION: 0 MM
STRESS STAGE 1 BP: NORMAL MMHG
STRESS STAGE 1 COMMENTS: NORMAL
STRESS STAGE 1 DURATION: 1 MIN:SEC
STRESS STAGE 1 HR: 113 BPM
STRESS STAGE 2 BP: NORMAL MMHG
STRESS STAGE 2 COMMENTS: NORMAL
STRESS STAGE 2 DURATION: 1 MIN:SEC
STRESS STAGE 2 HR: 114 BPM
STRESS STAGE 3 BP: NORMAL MMHG
STRESS STAGE 3 COMMENTS: NORMAL
STRESS STAGE 3 DURATION: 1 MIN:SEC
STRESS STAGE 3 HR: 111 BPM
STRESS STAGE 4 COMMENTS: NORMAL
STRESS STAGE RECOVERY 1 DURATION: 1 MIN:SEC
STRESS STAGE RECOVERY 1 HR: 107 BPM
STRESS STAGE RECOVERY 2 BP: NORMAL MMHG
STRESS STAGE RECOVERY 2 DURATION: 1 MIN:SEC
STRESS STAGE RECOVERY 2 HR: 103 BPM
STRESS STAGE RECOVERY 3 BP: NORMAL MMHG
STRESS STAGE RECOVERY 3 DURATION: 1 MIN:SEC
STRESS STAGE RECOVERY 3 HR: 105 BPM
STRESS STAGE RECOVERY 4 BP: NORMAL MMHG
STRESS STAGE RECOVERY 4 DURATION: 1 MIN:SEC
STRESS STAGE RECOVERY 4 HR: 100 BPM
STRESS TARGET HR: 180 BPM
TID: 1.07
TILT CV INITIAL SUPINE HEART RATE: 89 BPM
TILT CV INITIAL SUPINE MAX BP: NORMAL BPM
TILT CV INITIAL TILT BLOOD PRESSURE: NORMAL MMHG
TILT CV INITIAL TILT HEART RATE: 94 BPM
TILT CV MAX BP BLOOD PRESSURE: NORMAL MMHG
TILT CV MAX BP HEART RATE: 96 BPM
TILT CV MAX BP MINUTES: 29
TILT CV MAX HEART RATE: 100 BPM
TILT CV MAX HR BLOOD PRESSURE: NORMAL MMHG
TILT CV MAX HR MINUTES: 9
TILT CV MINIMUM BP BLOOD PRESSURE: NORMAL MMHG
TILT CV MINIMUM BP HEART RATE: 94 BPM
TILT CV MINIMUM BP MINUTES: 1
TILT CV MINIMUM HR BP: NORMAL MMHG
TILT CV MINIMUM HR HEART RATE: 92 BPM
TILT CV MINIMUM HR MINUTES: 30

## 2025-03-26 PROCEDURE — 78452 HT MUSCLE IMAGE SPECT MULT: CPT

## 2025-03-26 PROCEDURE — 93018 CV STRESS TEST I&R ONLY: CPT | Performed by: INTERNAL MEDICINE

## 2025-03-26 PROCEDURE — 3430000000 HC RX DIAGNOSTIC RADIOPHARMACEUTICAL: Performed by: INTERNAL MEDICINE

## 2025-03-26 PROCEDURE — 6360000002 HC RX W HCPCS: Performed by: INTERNAL MEDICINE

## 2025-03-26 PROCEDURE — 93246 EXT ECG>7D<15D RECORDING: CPT

## 2025-03-26 PROCEDURE — 93017 CV STRESS TEST TRACING ONLY: CPT

## 2025-03-26 PROCEDURE — 78452 HT MUSCLE IMAGE SPECT MULT: CPT | Performed by: INTERNAL MEDICINE

## 2025-03-26 PROCEDURE — 93660 TILT TABLE EVALUATION: CPT

## 2025-03-26 PROCEDURE — A9500 TC99M SESTAMIBI: HCPCS | Performed by: INTERNAL MEDICINE

## 2025-03-26 PROCEDURE — 93016 CV STRESS TEST SUPVJ ONLY: CPT | Performed by: INTERNAL MEDICINE

## 2025-03-26 RX ORDER — TETRAKIS(2-METHOXYISOBUTYLISOCYANIDE)COPPER(I) TETRAFLUOROBORATE 1 MG/ML
9.1 INJECTION, POWDER, LYOPHILIZED, FOR SOLUTION INTRAVENOUS
Status: COMPLETED | OUTPATIENT
Start: 2025-03-26 | End: 2025-03-26

## 2025-03-26 RX ORDER — TETRAKIS(2-METHOXYISOBUTYLISOCYANIDE)COPPER(I) TETRAFLUOROBORATE 1 MG/ML
29.3 INJECTION, POWDER, LYOPHILIZED, FOR SOLUTION INTRAVENOUS
Status: COMPLETED | OUTPATIENT
Start: 2025-03-26 | End: 2025-03-26

## 2025-03-26 RX ORDER — REGADENOSON 0.08 MG/ML
0.4 INJECTION, SOLUTION INTRAVENOUS
Status: COMPLETED | OUTPATIENT
Start: 2025-03-26 | End: 2025-03-26

## 2025-03-26 RX ADMIN — REGADENOSON 0.4 MG: 0.08 INJECTION, SOLUTION INTRAVENOUS at 12:56

## 2025-03-26 RX ADMIN — Medication 9.1 MILLICURIE: at 11:40

## 2025-03-26 RX ADMIN — Medication 29.3 MILLICURIE: at 12:51

## 2025-03-27 ENCOUNTER — HOSPITAL ENCOUNTER (OUTPATIENT)
Dept: NEUROLOGY | Age: 40
Discharge: HOME | End: 2025-03-27
Payer: COMMERCIAL

## 2025-03-27 DIAGNOSIS — G44.89 OTHER HEADACHE SYNDROME: ICD-10-CM

## 2025-03-27 DIAGNOSIS — G43.109 COMPLICATED MIGRAINE: ICD-10-CM

## 2025-03-27 PROCEDURE — 95816 EEG AWAKE AND DROWSY: CPT

## 2025-03-27 PROCEDURE — 95819 EEG AWAKE AND ASLEEP: CPT | Performed by: PSYCHIATRY & NEUROLOGY

## 2025-03-27 NOTE — PROCEDURES
[address] [address]                       ELECTROENCEPHALOGRAM REPORT      PATIENT NAME: RODRIGO TIRADO              : 1985  MED REC NO: [id number]                       ROOM:   ACCOUNT NO: [id number]                       ADMIT DATE: 2025  PROVIDER: Moises Brown MD      DATE OF SERVICE:  2025    REFERRING PHYSICIAN:  PAIGE L LEOPOLD    CLINICAL HISTORY:  This is a 40-year-old female presenting with blackout spells, headaches, blurred vision, left-sided weakness, and syncopal episodes. Evaluate for seizure.    PAST MEDICAL HISTORY:  Significant for asthma, depression, diabetes, hemorrhoids, hyperlipidemia, hypertension, headache.    MEDICATIONS:  Listed are Topamax, Ubrelvy, Toradol, Trulicity, BuSpar, Lipitor, Bumex, Coreg, Cozaar.    DESCRIPTION OF EEG:  This is a routine 20-minute EEG recording using the [employer] 10/20 system on [employer] workstation. Automated spike and seizure detection algorithms were applied.  The patient is described as alert.    The background rhythm activity is noted to be 9 hertz in the posterior parietal area, symmetric, well modulated, attenuates with eye opening.  The patient is noted to be drowsy during parts of recording.  Video EEG was used during this recording.  The patient was noted to be drowsy during parts of recording.  The patient was noted to be asleep during parts of recording.  Hyperventilation and photic stimulation were performed without abnormality.  There was no evidence of epileptiform activity appreciated.  No clinical seizures were observed.    IMPRESSION:  This is a normal awake and sleeping EEG.  There was no evidence of epileptiform activity appreciated.  No clinical seizures were observed.          MOISES BROWN MD      D:  2025 15:32:11     T:  2025 15:49:02     ASA/AQS  Job #:  [id number]     Doc#:  [id number]

## 2025-03-27 NOTE — PROGRESS NOTES
[facility]  Neurodiagnostic Laboratory Technician Report  STRZ EEG  Routine, Standard Test    Name: Nyasia Rinaldi  : 1985  Age: 40 y.o.  Sex: female  MRN: [id number]  CSN: [id number]    Ordering Provider: Paige L Leopold, APRN -*       EEG Number: [id number]    Time In: Time In: 1322 (3/27/25)  Time Out: Time Out: 1342 (3/27/2025)  Total Treatment Time: Minutes: 20          Clinical History: Blackouts that started in 2024. Severe headaches, blurred vision in (L) eye with (L) sided weakness. Syncopal episodes. Stroke alert was called 2025.    2025  MRI Brain wo Cont   Impression:  1. Stable MRI scan of the brain, no interval change since previous study dated  10/29/2024.  2. Punctate areas of increased signal intensity in the white matter most likely  representing ischemic changes. No evidence of an acute infarct.    2025  CT Head wo Cont   Impression:  1. Stable CT scan of the brain, no interval change since previous study dated 2024.  2. Results called to the stroke team at 11:10 a.m..    Past Medical History:       Diagnosis Date    Asthma     Depression     Diabetes mellitus (HCC)     Hemorrhoids     Hyperlipidemia     Hypertension     Intractable headache 10/30/2024       Medications:   Prior to Admission medications    Medication Sig Start Date End Date Taking? Authorizing Provider   topiramate (TOPAMAX) 50 MG tablet Topamax 25 mg tablet daily for one week then 50 mg daily there after. Take with plenty of fluids. 3/19/25   Leopold, Paige L, APRN - CNP   Ubrogepant (UBRELVY) 50 MG TABS Take 50 mg by mouth daily as needed (headache) LOT [id number] EXP 2026 3/19/25 4/30/25  Leopold, Paige L, APRN - CNP   ketorolac (TORADOL) 10 MG tablet Take 1 tablet by mouth every 6 hours as needed for Pain  Patient not taking: Reported on 3/19/2025 2/28/25   Lam Carballo DO   dulaglutide (TRULICITY) 0.75 MG/0.5ML SOAJ SC injection Inject 0.5 mLs into the skin every 7

## 2025-03-29 NOTE — PROGRESS NOTES
Chief Complaint   Patient presents with    Consultation/Headache     Nyasia Rinaldi is a 40 y.o. female who presents today for evaluation of headache since teenage years that have progressively gotten worse in the past 2 months. She had an episode in February 2025 where she developed headache, left eye blurriness and left arm numbness. She had a similar episode that occurred in October 2024 where she had the exact symptoms. MRI brain Wo contrast done 2/28/25 showed Stable MRI scan of the brain, no interval change since previous study dated 10/29/2024. Punctate areas of increased signal intensity in the white matter most likely representing ischemic changes. No evidence of an acute infarct. CTA head and neck W/WO contrast done 2/28/25 showed Negative CTA of the head and neck. Frequency of headache is daily. She is not at risk of pregnancy, she had partial hysterectomy. She has tried over the counter medication without relief, she has also tried effexor. Her sister has headache. Her sleep is poor and interrupted, she wakes up feeling tired. She snores. Her  reports she does pause breathing during sleep. She takes daytime naps. She  denies chest pain. No shortness of breath, no neck pain. No vision changes. No dysphagia. No fever. No rash. No weight loss.  History provided by patient accompanied by her .       Past Medical History:   Diagnosis Date    Asthma     Depression     Diabetes mellitus (HCC)     Hemorrhoids     Hyperlipidemia     Hypertension     Intractable headache 10/30/2024       Patient Active Problem List   Diagnosis    Asthma with bronchitis    Diabetes mellitus, type 2 (HCC)    Primary hypertension    Left-sided weakness    Intermittent asthma without complication    Cervical stenosis of spinal canal    Intractable headache    Acute left-sided weakness    Chest pain, atypical    History of syncope appear vv    Hx of Bilateral leg edema on bumex for 1 yr       Allergies   Allergen

## 2025-03-31 ASSESSMENT — PATIENT HEALTH QUESTIONNAIRE - PHQ9
9. THOUGHTS THAT YOU WOULD BE BETTER OFF DEAD, OR OF HURTING YOURSELF: NOT AT ALL
7. TROUBLE CONCENTRATING ON THINGS, SUCH AS READING THE NEWSPAPER OR WATCHING TELEVISION: SEVERAL DAYS
6. FEELING BAD ABOUT YOURSELF - OR THAT YOU ARE A FAILURE OR HAVE LET YOURSELF OR YOUR FAMILY DOWN: MORE THAN HALF THE DAYS
SUM OF ALL RESPONSES TO PHQ QUESTIONS 1-9: 15
8. MOVING OR SPEAKING SO SLOWLY THAT OTHER PEOPLE COULD HAVE NOTICED. OR THE OPPOSITE - BEING SO FIDGETY OR RESTLESS THAT YOU HAVE BEEN MOVING AROUND A LOT MORE THAN USUAL: NOT AT ALL
10. IF YOU CHECKED OFF ANY PROBLEMS, HOW DIFFICULT HAVE THESE PROBLEMS MADE IT FOR YOU TO DO YOUR WORK, TAKE CARE OF THINGS AT HOME, OR GET ALONG WITH OTHER PEOPLE: VERY DIFFICULT
7. TROUBLE CONCENTRATING ON THINGS, SUCH AS READING THE NEWSPAPER OR WATCHING TELEVISION: SEVERAL DAYS
9. THOUGHTS THAT YOU WOULD BE BETTER OFF DEAD, OR OF HURTING YOURSELF: NOT AT ALL
10. IF YOU CHECKED OFF ANY PROBLEMS, HOW DIFFICULT HAVE THESE PROBLEMS MADE IT FOR YOU TO DO YOUR WORK, TAKE CARE OF THINGS AT HOME, OR GET ALONG WITH OTHER PEOPLE: VERY DIFFICULT
1. LITTLE INTEREST OR PLEASURE IN DOING THINGS: MORE THAN HALF THE DAYS
3. TROUBLE FALLING OR STAYING ASLEEP: NEARLY EVERY DAY
2. FEELING DOWN, DEPRESSED OR HOPELESS: MORE THAN HALF THE DAYS
3. TROUBLE FALLING OR STAYING ASLEEP: NEARLY EVERY DAY
6. FEELING BAD ABOUT YOURSELF - OR THAT YOU ARE A FAILURE OR HAVE LET YOURSELF OR YOUR FAMILY DOWN: MORE THAN HALF THE DAYS
2. FEELING DOWN, DEPRESSED OR HOPELESS: MORE THAN HALF THE DAYS
SUM OF ALL RESPONSES TO PHQ QUESTIONS 1-9: 15
4. FEELING TIRED OR HAVING LITTLE ENERGY: NEARLY EVERY DAY
SUM OF ALL RESPONSES TO PHQ QUESTIONS 1-9: 15
4. FEELING TIRED OR HAVING LITTLE ENERGY: NEARLY EVERY DAY
8. MOVING OR SPEAKING SO SLOWLY THAT OTHER PEOPLE COULD HAVE NOTICED. OR THE OPPOSITE, BEING SO FIGETY OR RESTLESS THAT YOU HAVE BEEN MOVING AROUND A LOT MORE THAN USUAL: NOT AT ALL
1. LITTLE INTEREST OR PLEASURE IN DOING THINGS: MORE THAN HALF THE DAYS
5. POOR APPETITE OR OVEREATING: MORE THAN HALF THE DAYS
5. POOR APPETITE OR OVEREATING: MORE THAN HALF THE DAYS
SUM OF ALL RESPONSES TO PHQ9 QUESTIONS 1 & 2: 4

## 2025-04-02 ENCOUNTER — OFFICE VISIT (OUTPATIENT)
Dept: FAMILY MEDICINE CLINIC | Age: 40
End: 2025-04-02
Payer: COMMERCIAL

## 2025-04-02 VITALS
RESPIRATION RATE: 18 BRPM | BODY MASS INDEX: 34.17 KG/M2 | HEIGHT: 66 IN | WEIGHT: 212.6 LBS | HEART RATE: 88 BPM | DIASTOLIC BLOOD PRESSURE: 66 MMHG | SYSTOLIC BLOOD PRESSURE: 110 MMHG

## 2025-04-02 DIAGNOSIS — I10 ESSENTIAL HYPERTENSION: ICD-10-CM

## 2025-04-02 DIAGNOSIS — E78.2 MIXED HYPERLIPIDEMIA: ICD-10-CM

## 2025-04-02 DIAGNOSIS — L21.9 SEBORRHEIC DERMATITIS: ICD-10-CM

## 2025-04-02 DIAGNOSIS — F41.1 GAD (GENERALIZED ANXIETY DISORDER): ICD-10-CM

## 2025-04-02 DIAGNOSIS — E66.811 OBESITY, CLASS I, BMI 30-34.9: ICD-10-CM

## 2025-04-02 DIAGNOSIS — R60.9 WATER RETENTION: ICD-10-CM

## 2025-04-02 DIAGNOSIS — R42 VERTIGO: ICD-10-CM

## 2025-04-02 DIAGNOSIS — E11.9 TYPE 2 DIABETES MELLITUS WITHOUT COMPLICATION, WITHOUT LONG-TERM CURRENT USE OF INSULIN: Primary | ICD-10-CM

## 2025-04-02 PROCEDURE — G8427 DOCREV CUR MEDS BY ELIG CLIN: HCPCS | Performed by: NURSE PRACTITIONER

## 2025-04-02 PROCEDURE — 4004F PT TOBACCO SCREEN RCVD TLK: CPT | Performed by: NURSE PRACTITIONER

## 2025-04-02 PROCEDURE — 3078F DIAST BP <80 MM HG: CPT | Performed by: NURSE PRACTITIONER

## 2025-04-02 PROCEDURE — 3046F HEMOGLOBIN A1C LEVEL >9.0%: CPT | Performed by: NURSE PRACTITIONER

## 2025-04-02 PROCEDURE — G8417 CALC BMI ABV UP PARAM F/U: HCPCS | Performed by: NURSE PRACTITIONER

## 2025-04-02 PROCEDURE — 2022F DILAT RTA XM EVC RTNOPTHY: CPT | Performed by: NURSE PRACTITIONER

## 2025-04-02 PROCEDURE — 99214 OFFICE O/P EST MOD 30 MIN: CPT | Performed by: NURSE PRACTITIONER

## 2025-04-02 PROCEDURE — 3074F SYST BP LT 130 MM HG: CPT | Performed by: NURSE PRACTITIONER

## 2025-04-02 PROCEDURE — G2211 COMPLEX E/M VISIT ADD ON: HCPCS | Performed by: NURSE PRACTITIONER

## 2025-04-02 RX ORDER — MECLIZINE HYDROCHLORIDE 25 MG/1
25 TABLET ORAL 3 TIMES DAILY PRN
Qty: 15 TABLET | Refills: 0 | Status: SHIPPED | OUTPATIENT
Start: 2025-04-02 | End: 2025-04-07

## 2025-04-02 RX ORDER — BUMETANIDE 1 MG/1
1 TABLET ORAL DAILY PRN
Qty: 30 TABLET | Refills: 1 | Status: SHIPPED | OUTPATIENT
Start: 2025-04-02 | End: 2025-09-29

## 2025-04-02 RX ORDER — DAPAGLIFLOZIN 10 MG/1
10 TABLET, FILM COATED ORAL EVERY MORNING
Qty: 90 TABLET | Refills: 1 | Status: SHIPPED | OUTPATIENT
Start: 2025-04-02

## 2025-04-02 RX ORDER — FLUCONAZOLE 150 MG/1
150 TABLET ORAL ONCE
Qty: 2 TABLET | Refills: 0 | Status: SHIPPED | OUTPATIENT
Start: 2025-04-02 | End: 2025-04-02

## 2025-04-02 RX ORDER — POTASSIUM CHLORIDE 750 MG/1
10 TABLET, EXTENDED RELEASE ORAL DAILY PRN
Qty: 30 TABLET | Refills: 1 | Status: SHIPPED | OUTPATIENT
Start: 2025-04-02

## 2025-04-02 ASSESSMENT — ENCOUNTER SYMPTOMS
SHORTNESS OF BREATH: 0
COUGH: 0
ABDOMINAL PAIN: 0
NAUSEA: 0

## 2025-04-02 NOTE — PATIENT INSTRUCTIONS
You may receive a survey about your visit with us today. The feedback from our patients helps us identify what is working well and where the service to all patients can be enhanced. Thank you!     Tobacco Cessation Programs     Telephonic behavior modification  1-800-QUIT-NOW (200-2618)  Counseling service for those who are ready to quit using tobacco.    Available for uninsured [place] residents, Medicaid recipients, pregnant women, or patients whose health plans or employers are members of the [place] [employer]    Online behavior modification  http://[place].Quitlogix.org  Online support program to help patients through each step of the quitting process.  Available 24 hours a day 7 days a week.  Provides up to date researched based tool, step-by-step guides, and motivational messages.    Online behavior modification  www.lungusa.org/stop-smoking/how-to-quit  HelpLine: 1-800-LUNG-[place] (019-9601)  Email questions to:  questions@[facility].org   Website offers resources to help tobacco users figure out their reasons for quitting and then take the big step of quitting for good.    Hypnosis  Location: [address] [place] [address]  Contact: Brooke Chan, PhD at [phone]  Hypnosis for tobacco cessation  Cost $225 for the initial session and $175 for each session afterwards.  Most patients require 6-8 sessions.  There is the option to submit through the patient’s insurance.    Hypnosis and behavior modification  Location: [address] [place] [address]  Contact: Marcos Burgess, PhD at [phone]  Counseling and hypnosis for nicotine addition  Cost: For uninsured patients:  Please call above phone number  Cost for insured patients depends on patient’s insurance plan.    Behavior modification  Location: [facility], [address] [place] [address]  Contact: [phone]  Services include four one-on-one appointments between the patient and a respiratory therapist.  The four appointments

## 2025-04-02 NOTE — PROGRESS NOTES
Subjective:      Patient ID: Nyasia Rinaldi is a 40 y.o. female.    HPI: Follow Up    Chief Complaint   Patient presents with    Follow-up     4 month follow up     Diabetes     Sugar has been elevated, not sure why     Health Maintenance     Due for mammogram     Lightheadedness     Passing out, dizziness when turning in bed, or sitting up    Discuss medication, blood pressure medication    Numbness     Toes     Other     When using bathroom noticed she notices when she wipes she will have little tear, blood  When being intimate she having the same thing     Eczema     Dry skin        Irritation in groin.  Increase in peeing due to hyperglycemia.  Pink on tissue when wipe.  Skin is irritated.     Patient Active Problem List   Diagnosis    Asthma with bronchitis    Diabetes mellitus, type 2 (HCC)    Primary hypertension    Left-sided weakness    Intermittent asthma without complication    Cervical stenosis of spinal canal    Intractable headache    Acute left-sided weakness    Chest pain, atypical    History of syncope appear vv    Hx of Bilateral leg edema on bumex for 1 yr       ENT - Dr. Reyes  SURG - Dr. Parkinson    Denies CP, SOB or chest tightness.  Following with CARDIO.  STRESS Test was unremarkable along with ECHO. Currently wearing a Holter Monitor    Following with NEURO for migraines.  On Topamax 50 mg.  MRI Brain unremarkable.  Had recent work up for stroke like symptoms with negative results - CTA Brain and NECK.  Dizziness with head movements - turning over in bed or sitting up.   Room spinning.     On Buspar 10 mg Daily.   Failed on Cymbalta, Effexor and Pristiq.  No better.  Stressors.  Anxiety dominant.      BP stable. Prinzide 20-12.5 mg.   Microalbuminuria NEG.     BP Readings from Last 3 Encounters:   04/02/25 110/66   03/19/25 130/85   03/05/25 (!) 155/96       Labs reviewed.     On Trulicity 0.75 Weekly.    Sugars running at home > 200.     Lab Results   Component Value Date    LABA1C 9.3

## 2025-04-08 ENCOUNTER — OFFICE VISIT (OUTPATIENT)
Dept: CARDIOLOGY CLINIC | Age: 40
End: 2025-04-08
Payer: COMMERCIAL

## 2025-04-08 VITALS
BODY MASS INDEX: 34.23 KG/M2 | SYSTOLIC BLOOD PRESSURE: 115 MMHG | DIASTOLIC BLOOD PRESSURE: 69 MMHG | WEIGHT: 213 LBS | HEART RATE: 95 BPM | HEIGHT: 66 IN

## 2025-04-08 DIAGNOSIS — Z87.898 HISTORY OF SYNCOPE: ICD-10-CM

## 2025-04-08 DIAGNOSIS — I10 PRIMARY HYPERTENSION: Primary | ICD-10-CM

## 2025-04-08 DIAGNOSIS — R42 DIZZINESS, NONSPECIFIC: ICD-10-CM

## 2025-04-08 PROCEDURE — G8417 CALC BMI ABV UP PARAM F/U: HCPCS | Performed by: INTERNAL MEDICINE

## 2025-04-08 PROCEDURE — 3078F DIAST BP <80 MM HG: CPT | Performed by: INTERNAL MEDICINE

## 2025-04-08 PROCEDURE — G8427 DOCREV CUR MEDS BY ELIG CLIN: HCPCS | Performed by: INTERNAL MEDICINE

## 2025-04-08 PROCEDURE — 99214 OFFICE O/P EST MOD 30 MIN: CPT | Performed by: INTERNAL MEDICINE

## 2025-04-08 PROCEDURE — 4004F PT TOBACCO SCREEN RCVD TLK: CPT | Performed by: INTERNAL MEDICINE

## 2025-04-08 PROCEDURE — 3074F SYST BP LT 130 MM HG: CPT | Performed by: INTERNAL MEDICINE

## 2025-04-08 NOTE — PROGRESS NOTES
Chief Complaint   Patient presents with    Follow-up     4 week follow up.     Originally seen originally on 3/5/25  as NP here - was in ER due to chest pain - pt states she blacked out    Prodrome dizziness  Was in bath room   Blurring of vision  Then syncope 2025  Postop syncope-headache and fatigue  Got up by herself    Hx of similar syncope oct 2024 and had neck surgery after    Gave Hx of chest pain 2024 no associated  symptom after syncope and resolve in few min        4 week follow up.    Last EKG done on 2025.    Sob on exertion chronic    Occasional chest tight- at rest and activity, not exertion induced, more with anxiety and going on for few yrs, mild and few min and goes . On treatment for anxiety  Had hx of anxiety issue  No recurrence of  chest pain that  took her to ER 2025  Work physical in [employer]/grocery and no limtation    Feel overall better and less fatigue and less achy and less tired    Hx of chronic fatigue    Cont to have Intermittent dizziness on standing , supine and sitting and more on turning or rolling over- better    Denied palpitation of edema     ORTHOSTATIC BP DONE TODAY    SUPINE              SITTING          STANDING  160/94 101        164/98 102       155/96 103    Vap    FHx  Mother had HTN    Drink 30 to 40 oz    PMHX  On bumex 1 mg  po for the last 1 yr for wt reason and considered some fliud retention          Past Surgical History:   Procedure Laterality Date    CERVICAL FUSION N/A 2024    CERVICAL LAMINECTOMY FUSION ANTERIOR C4-C5, C6-C7 performed by Ladan Pacheco MD at [facility] OR     SECTION      COLONOSCOPY      COLONOSCOPY Left 2019    COLONOSCOPY performed by Neal Leon MD at [facility] OR    HEMORRHOID SURGERY N/A 2019    EXCISIONAL HEMORRHOIDECTOMY performed by Amari Parkinson MD at [facility] OR    HYSTERECTOMY (CERVIX STATUS UNKNOWN)  2017    ROBOTIC LAPAROSCOPIC WITH BS    TONSILLECTOMY      as a

## 2025-04-22 ENCOUNTER — TRANSCRIBE ORDERS (OUTPATIENT)
Dept: ADMINISTRATIVE | Age: 40
End: 2025-04-22

## 2025-04-22 DIAGNOSIS — Z12.31 ENCOUNTER FOR SCREENING MAMMOGRAM FOR MALIGNANT NEOPLASM OF BREAST: Primary | ICD-10-CM

## 2025-04-24 ENCOUNTER — PATIENT MESSAGE (OUTPATIENT)
Dept: FAMILY MEDICINE CLINIC | Age: 40
End: 2025-04-24

## 2025-04-24 DIAGNOSIS — E11.9 TYPE 2 DIABETES MELLITUS WITHOUT COMPLICATION, WITHOUT LONG-TERM CURRENT USE OF INSULIN (HCC): Primary | ICD-10-CM

## 2025-04-29 ENCOUNTER — HOSPITAL ENCOUNTER (OUTPATIENT)
Dept: WOMENS IMAGING | Age: 40
Discharge: HOME | End: 2025-04-29
Payer: COMMERCIAL

## 2025-04-29 ENCOUNTER — PATIENT MESSAGE (OUTPATIENT)
Dept: FAMILY MEDICINE CLINIC | Age: 40
End: 2025-04-29

## 2025-04-29 VITALS — BODY MASS INDEX: 34.55 KG/M2 | WEIGHT: 215 LBS | HEIGHT: 66 IN

## 2025-04-29 DIAGNOSIS — N64.52 NIPPLE DISCHARGE: ICD-10-CM

## 2025-04-29 DIAGNOSIS — N64.52 NIPPLE DISCHARGE: Primary | ICD-10-CM

## 2025-04-29 DIAGNOSIS — Z12.31 ENCOUNTER FOR SCREENING MAMMOGRAM FOR MALIGNANT NEOPLASM OF BREAST: ICD-10-CM

## 2025-04-29 PROCEDURE — G0279 TOMOSYNTHESIS, MAMMO: HCPCS

## 2025-04-29 PROCEDURE — 76642 ULTRASOUND BREAST LIMITED: CPT

## 2025-04-30 ENCOUNTER — TELEPHONE (OUTPATIENT)
Dept: FAMILY MEDICINE CLINIC | Age: 40
End: 2025-04-30

## 2025-04-30 NOTE — TELEPHONE ENCOUNTER
Patient called and was seen on 4/20/2025 and she is starting a new job on Monday. She is needing paperwork filled out stating she is mentally and physically able to do the job.     She didn't know if she could drop the paperwork off this afternoon or if she needed to schedule another appointment.     We can send patient [employer] message back.

## 2025-06-09 ENCOUNTER — TELEPHONE (OUTPATIENT)
Dept: FAMILY MEDICINE CLINIC | Age: 40
End: 2025-06-09

## 2025-06-09 DIAGNOSIS — E11.9 TYPE 2 DIABETES MELLITUS WITHOUT COMPLICATION, WITHOUT LONG-TERM CURRENT USE OF INSULIN (HCC): ICD-10-CM

## 2025-06-23 ENCOUNTER — APPOINTMENT (OUTPATIENT)
Dept: CT IMAGING | Age: 40
End: 2025-06-23
Payer: COMMERCIAL

## 2025-06-23 ENCOUNTER — HOSPITAL ENCOUNTER (EMERGENCY)
Age: 40
Discharge: HOME | End: 2025-06-23
Attending: STUDENT IN AN ORGANIZED HEALTH CARE EDUCATION/TRAINING PROGRAM
Payer: COMMERCIAL

## 2025-06-23 VITALS
DIASTOLIC BLOOD PRESSURE: 91 MMHG | RESPIRATION RATE: 15 BRPM | TEMPERATURE: 98.3 F | OXYGEN SATURATION: 97 % | WEIGHT: 205 LBS | BODY MASS INDEX: 33.09 KG/M2 | SYSTOLIC BLOOD PRESSURE: 127 MMHG | HEART RATE: 88 BPM
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
## 2025-06-23 LAB
ALBUMIN SERPL BCG-MCNC: 4 G/DL (ref 3.4–4.9)
ALP SERPL-CCNC: 89 U/L (ref 38–126)
ALT SERPL W/O P-5'-P-CCNC: 38 U/L (ref 10–35)
ANION GAP SERPL CALC-SCNC: 11 MEQ/L (ref 8–16)
AST SERPL-CCNC: 20 U/L (ref 10–35)
BASOPHILS ABSOLUTE: 0.1 THOU/MM3 (ref 0–0.1)
BASOPHILS NFR BLD AUTO: 0.6 %
BILIRUB SERPL-MCNC: 0.4 MG/DL (ref 0.3–1.2)
BUN SERPL-MCNC: 7 MG/DL (ref 8–23)
CALCIUM SERPL-MCNC: 9.2 MG/DL (ref 8.6–10)
CHLORIDE SERPL-SCNC: 109 MEQ/L (ref 98–111)
CO2 SERPL-SCNC: 21 MEQ/L (ref 22–29)
CREAT SERPL-MCNC: 0.7 MG/DL (ref 0.5–0.9)
DEPRECATED RDW RBC AUTO: 39 FL (ref 35–45)
EKG ATRIAL RATE: 92 BPM
EKG P AXIS: 70 DEGREES
EKG P-R INTERVAL: 142 MS
EKG Q-T INTERVAL: 360 MS
EKG QRS DURATION: 84 MS
EKG QTC CALCULATION (BAZETT): 445 MS
EKG R AXIS: 88 DEGREES
EKG T AXIS: 51 DEGREES
EKG VENTRICULAR RATE: 92 BPM
EOSINOPHIL NFR BLD AUTO: 1.3 %
EOSINOPHILS ABSOLUTE: 0.1 THOU/MM3 (ref 0–0.4)
ERYTHROCYTE [DISTWIDTH] IN BLOOD BY AUTOMATED COUNT: 12.9 % (ref 11.5–14.5)
GFR SERPL CREATININE-BSD FRML MDRD: > 90 ML/MIN/1.73M2
GLUCOSE SERPL-MCNC: 118 MG/DL (ref 74–109)
HCT VFR BLD AUTO: 46.1 % (ref 37–47)
HGB BLD-MCNC: 15.4 GM/DL (ref 12–16)
IMM GRANULOCYTES # BLD AUTO: 0.03 THOU/MM3 (ref 0–0.07)
IMM GRANULOCYTES NFR BLD AUTO: 0.3 %
LYMPHOCYTES ABSOLUTE: 2.2 THOU/MM3 (ref 1–4.8)
LYMPHOCYTES NFR BLD AUTO: 22.9 %
MCH RBC QN AUTO: 28.3 PG (ref 26–33)
MCHC RBC AUTO-ENTMCNC: 33.4 GM/DL (ref 32.2–35.5)
MCV RBC AUTO: 84.6 FL (ref 81–99)
MONOCYTES ABSOLUTE: 0.7 THOU/MM3 (ref 0.4–1.3)
MONOCYTES NFR BLD AUTO: 7.5 %
NEUTROPHILS ABSOLUTE: 6.3 THOU/MM3 (ref 1.8–7.7)
NEUTROPHILS NFR BLD AUTO: 67.4 %
NRBC BLD AUTO-RTO: 0 /100 WBC
OSMOLALITY SERPL CALC.SUM OF ELEC: 280.3 MOSMOL/KG (ref 275–300)
PLATELET # BLD AUTO: 245 THOU/MM3 (ref 130–400)
PMV BLD AUTO: 9.1 FL (ref 9.4–12.4)
POTASSIUM SERPL-SCNC: 3.9 MEQ/L (ref 3.5–5.2)
PROT SERPL-MCNC: 7 G/DL (ref 6.4–8.3)
RBC # BLD AUTO: 5.45 MILL/MM3 (ref 4.2–5.4)
SODIUM SERPL-SCNC: 141 MEQ/L (ref 135–145)
TROPONIN, HIGH SENSITIVITY: < 6 NG/L (ref 0–12)
WBC # BLD AUTO: 9.4 THOU/MM3 (ref 4.8–10.8)

## 2025-06-23 PROCEDURE — 2580000003 HC RX 258

## 2025-06-23 PROCEDURE — 99284 EMERGENCY DEPT VISIT MOD MDM: CPT

## 2025-06-23 PROCEDURE — 80053 COMPREHEN METABOLIC PANEL: CPT

## 2025-06-23 PROCEDURE — 36415 COLL VENOUS BLD VENIPUNCTURE: CPT

## 2025-06-23 PROCEDURE — 93010 ELECTROCARDIOGRAM REPORT: CPT | Performed by: INTERNAL MEDICINE

## 2025-06-23 PROCEDURE — 93005 ELECTROCARDIOGRAM TRACING: CPT

## 2025-06-23 PROCEDURE — 85025 COMPLETE CBC W/AUTO DIFF WBC: CPT

## 2025-06-23 PROCEDURE — 84484 ASSAY OF TROPONIN QUANT: CPT

## 2025-06-23 PROCEDURE — 72125 CT NECK SPINE W/O DYE: CPT

## 2025-06-23 PROCEDURE — 70450 CT HEAD/BRAIN W/O DYE: CPT

## 2025-06-23 RX ORDER — 0.9 % SODIUM CHLORIDE 0.9 %
1000 INTRAVENOUS SOLUTION INTRAVENOUS ONCE
Status: COMPLETED | OUTPATIENT
Start: 2025-06-23 | End: 2025-06-23

## 2025-06-23 RX ADMIN — SODIUM CHLORIDE 1000 ML: 9 INJECTION, SOLUTION INTRAVENOUS at 14:34

## 2025-06-23 ASSESSMENT — PAIN - FUNCTIONAL ASSESSMENT: PAIN_FUNCTIONAL_ASSESSMENT: 0-10

## 2025-06-23 ASSESSMENT — PAIN SCALES - GENERAL
PAINLEVEL_OUTOF10: 8
PAINLEVEL_OUTOF10: 8

## 2025-06-23 ASSESSMENT — PAIN DESCRIPTION - LOCATION: LOCATION: HEAD

## 2025-06-23 NOTE — ED PROVIDER NOTES
[facility] EMERGENCY DEPARTMENT  EMERGENCY DEPARTMENT ENCOUNTER          Pt Name: Nyasai Rinaldi  MRN: [id number]  Birthdate 1985  Date of evaluation: 2025  Physician: Alonzo Toney MD  Supervising Attending Physician: Jayden Goldstein MD       CHIEF COMPLAINT       Chief Complaint   Patient presents with    Headache    Neck Pain         HISTORY OF PRESENT ILLNESS    HPI  Nyasia Rinaldi is a 40 y.o. female who presents to the emergency department from home, as a walk in to the ED lob for evaluation of fall.  Patient reports that earlier this afternoon she was in her house whenever she felt dizzy.  Stated that she fell backwards hitting her head.  Denies any loss consciousness.  She does report pain to the back of her head and neck since then.  She states that she has been having these dizzy spells for the past year.  She currently denies any dizziness.  She denies any numbness or tingling in her extremities.  Denies any weakness.  She does report that she has some blurry vision in both eyes.  The patient has no other acute complaints at this time.            PAST MEDICAL AND SURGICAL HISTORY     Past Medical History:   Diagnosis Date    Asthma     Depression     Diabetes mellitus (HCC)     Hemorrhoids     Hyperlipidemia     Hypertension     Intractable headache 10/30/2024     Past Surgical History:   Procedure Laterality Date    CERVICAL FUSION N/A 2024    CERVICAL LAMINECTOMY FUSION ANTERIOR C4-C5, C6-C7 performed by Ladan Pacheco MD at [facility] OR     SECTION      COLONOSCOPY      COLONOSCOPY Left 2019    COLONOSCOPY performed by Neal Leon MD at [facility] OR    HEMORRHOID SURGERY N/A 2019    EXCISIONAL HEMORRHOIDECTOMY performed by Amari Parkinson MD at [facility] OR    HYSTERECTOMY (CERVIX STATUS UNKNOWN)  2017    ROBOTIC LAPAROSCOPIC WITH BS    TONSILLECTOMY      as a child    TYMPANOSTOMY TUBE PLACEMENT      as a child-multiple times

## 2025-06-23 NOTE — ED PROVIDER NOTES
PATIENT NAME: Nyasia Rinaldi  MRN: [id number]  : 1985  ZIMMER: 2025    I performed a history and physical examination of the patient and discussed management with the Resident. I reviewed the Resident's note and agree with the documented findings and plan of care. Any areas of disagreement are noted on the chart. I was personally present for the key portions of any procedures and have documented in the chart those procedures where I was not present during the key portions. I have reviewed the emergency nurses triage note and agree with the chief complaint, past medical history, past surgical history, allergies, medications, social and family history as documented unless otherwise noted below.    MEDICAL DEDISION MAKING (MDM)     Nyasia Rinaldi is a 40 y.o. female presents with fall and hit head and neck.      She has frequent dizziness spells without clear etiology. She has seen cardiology without clear answers     Exam: AVSS. Nontoxic appearing. Heart: RRR, S1 and S2. Lungs CTAB. Soft abdomen w/o tenderness. Neurologically intact. No skin rashes.     EKG: Normal sinus rhythm, ventricular rate 92 bpm, TX interval 142 ms, QRS duration 84 ms,  ms, no acute ischemic changes normal ECG.     ED workup is reassuring. No internal injuries.  Labs reassuring.  No evidence patient's dizziness is secondary to serious conditions such as severe anemia, ACS, cardiomyopathy, cardiac arrhythmia, ruptured AAA, PE, or ruptured ectopic pregnancy.  Patient reassured and discharged with cardiology follow-up.     Vitals:    25 1350 25 1351 25 1355 25 1518   BP:   (!) 152/100 (!) 127/91   Pulse:  90  88   Resp:  16  15   Temp:  98.3 °F (36.8 °C)     TempSrc:  Oral     SpO2:  98%  97%   Weight: 93 kg (205 lb)        Labs Reviewed   CBC WITH AUTO DIFFERENTIAL - Abnormal; Notable for the following components:       Result Value    RBC 5.45 (*)     MPV 9.1 (*)     All other components within normal

## 2025-06-23 NOTE — DISCHARGE INSTRUCTIONS
Please follow up with your cardiologist and  neurologist.   Return to the emergency department for any change of worsening of symptoms including chest pain, shortness of breath, dizziness.

## 2025-06-23 NOTE — ED NOTES
Patient presents to the ED with mother for concerns of a fall at home. Patient states she tripped and hit the back of her head into the wall and is now having a headache and a neck pain at a 8/10. EKG completed. VSS. Patient states fall happened between 12noon and 1230 today. Patient denies passing out and denies being on blood thinners.

## 2025-06-30 ENCOUNTER — OFFICE VISIT (OUTPATIENT)
Dept: CARDIOLOGY CLINIC | Age: 40
End: 2025-06-30
Payer: COMMERCIAL

## 2025-06-30 ENCOUNTER — HOSPITAL ENCOUNTER (OUTPATIENT)
Age: 40
Discharge: HOME | End: 2025-06-30
Payer: COMMERCIAL

## 2025-06-30 ENCOUNTER — RESULTS FOLLOW-UP (OUTPATIENT)
Dept: FAMILY MEDICINE CLINIC | Age: 40
End: 2025-06-30

## 2025-06-30 VITALS
BODY MASS INDEX: 33.01 KG/M2 | SYSTOLIC BLOOD PRESSURE: 136 MMHG | HEIGHT: 66 IN | WEIGHT: 205.4 LBS | HEART RATE: 99 BPM | DIASTOLIC BLOOD PRESSURE: 91 MMHG

## 2025-06-30 DIAGNOSIS — R09.89 BILATERAL CAROTID BRUITS: ICD-10-CM

## 2025-06-30 DIAGNOSIS — R42 DIZZINESS, NONSPECIFIC: Primary | ICD-10-CM

## 2025-06-30 DIAGNOSIS — I10 PRIMARY HYPERTENSION: ICD-10-CM

## 2025-06-30 DIAGNOSIS — E11.9 TYPE 2 DIABETES MELLITUS WITHOUT COMPLICATION, WITHOUT LONG-TERM CURRENT USE OF INSULIN (HCC): ICD-10-CM

## 2025-06-30 DIAGNOSIS — Z87.898 HISTORY OF SYNCOPE: ICD-10-CM

## 2025-06-30 DIAGNOSIS — R60.0 BILATERAL LEG EDEMA: ICD-10-CM

## 2025-06-30 LAB
CREAT UR-MCNC: 82.1 MG/DL
DEPRECATED MEAN GLUCOSE BLD GHB EST-ACNC: 183 MG/DL (ref 70–126)
HBA1C MFR BLD HPLC: 8.1 % (ref 4–6)
MICROALBUMIN UR-MCNC: 2.79 MG/DL
MICROALBUMIN/CREAT RATIO PNL UR: 34 MG/G (ref 0–30)

## 2025-06-30 PROCEDURE — G8427 DOCREV CUR MEDS BY ELIG CLIN: HCPCS | Performed by: INTERNAL MEDICINE

## 2025-06-30 PROCEDURE — 3075F SYST BP GE 130 - 139MM HG: CPT | Performed by: INTERNAL MEDICINE

## 2025-06-30 PROCEDURE — G8417 CALC BMI ABV UP PARAM F/U: HCPCS | Performed by: INTERNAL MEDICINE

## 2025-06-30 PROCEDURE — 36415 COLL VENOUS BLD VENIPUNCTURE: CPT

## 2025-06-30 PROCEDURE — 3079F DIAST BP 80-89 MM HG: CPT | Performed by: INTERNAL MEDICINE

## 2025-06-30 PROCEDURE — 4004F PT TOBACCO SCREEN RCVD TLK: CPT | Performed by: INTERNAL MEDICINE

## 2025-06-30 PROCEDURE — 83036 HEMOGLOBIN GLYCOSYLATED A1C: CPT

## 2025-06-30 PROCEDURE — 99214 OFFICE O/P EST MOD 30 MIN: CPT | Performed by: INTERNAL MEDICINE

## 2025-06-30 PROCEDURE — 82043 UR ALBUMIN QUANTITATIVE: CPT

## 2025-06-30 NOTE — PROGRESS NOTES
Chief Complaint   Patient presents with    Follow-Up from Hospital    Hypertension     Originally seen originally on 3/5/25  as NP here - was in ER due to chest pain - pt states she blacked out    Prodrome dizziness  Was in bath room   Blurring of vision  Then syncope 2025  Postop syncope-headache and fatigue  Got up by herself    Hx of similar syncope oct 2024 and had neck surgery after    Gave Hx of chest pain 2024 no associated  symptom after syncope and resolve in few min        Hospital follow up for  dizziness and fall with NO LOC from ER. And found to have carotid  ds    EKG done 2025.    Cont to have Intermittent dizziness on standing , supine and sitting and more on turning or rolling over- intermittent attacks    Denies CP, sob and CHEN    Orthostatic BP done today- negative 25    Last EKG done on 2025.    Sob on exertion chronic     Hx of Occasional chest tight- at rest and activity, not exertion induced, more with anxiety and going on for few yrs, mild and few min and goes . On treatment for anxiety  Had hx of anxiety issue  No recurrence of  chest pain that  took her to ER 2025  Used [employer] physical in [employer]/grocery and no limtation    Hx of chronic fatigue    Denied palpitation of edema     ORTHOSTATIC BP DONE prior 3./5/25    SUPINE              SITTING          STANDING  160/94 101        164/98 102       155/96 103    Vap    FHx  Mother had HTN    Drink 30 to 40 oz    PMHX  On bumex 1 mg  po for the last 1 yr for wt reason and considered some fliud retention          Past Surgical History:   Procedure Laterality Date    CERVICAL FUSION N/A 2024    CERVICAL LAMINECTOMY FUSION ANTERIOR C4-C5, C6-C7 performed by Ladan Pacheco MD at [facility] OR     SECTION      COLONOSCOPY      COLONOSCOPY Left 2019    COLONOSCOPY performed by Neal Leon MD at [facility] OR    HEMORRHOID SURGERY N/A 2019    EXCISIONAL HEMORRHOIDECTOMY performed

## 2025-07-02 ENCOUNTER — HOSPITAL ENCOUNTER (OUTPATIENT)
Dept: ULTRASOUND IMAGING | Age: 40
Discharge: HOME | End: 2025-07-02
Attending: INTERNAL MEDICINE
Payer: COMMERCIAL

## 2025-07-02 DIAGNOSIS — R09.89 BILATERAL CAROTID BRUITS: ICD-10-CM

## 2025-07-02 PROCEDURE — 93880 EXTRACRANIAL BILAT STUDY: CPT

## 2025-07-03 ENCOUNTER — OFFICE VISIT (OUTPATIENT)
Dept: FAMILY MEDICINE CLINIC | Age: 40
End: 2025-07-03
Payer: COMMERCIAL

## 2025-07-03 VITALS
HEART RATE: 72 BPM | BODY MASS INDEX: 33.25 KG/M2 | RESPIRATION RATE: 16 BRPM | DIASTOLIC BLOOD PRESSURE: 74 MMHG | SYSTOLIC BLOOD PRESSURE: 128 MMHG | WEIGHT: 206 LBS

## 2025-07-03 DIAGNOSIS — Z00.00 WELL ADULT EXAM: Primary | ICD-10-CM

## 2025-07-03 DIAGNOSIS — I10 ESSENTIAL HYPERTENSION: ICD-10-CM

## 2025-07-03 DIAGNOSIS — N76.0 ACUTE VAGINITIS: ICD-10-CM

## 2025-07-03 DIAGNOSIS — E78.2 MIXED HYPERLIPIDEMIA: ICD-10-CM

## 2025-07-03 DIAGNOSIS — F41.1 GAD (GENERALIZED ANXIETY DISORDER): ICD-10-CM

## 2025-07-03 DIAGNOSIS — E11.9 TYPE 2 DIABETES MELLITUS WITHOUT COMPLICATION, WITHOUT LONG-TERM CURRENT USE OF INSULIN (HCC): ICD-10-CM

## 2025-07-03 PROCEDURE — 3074F SYST BP LT 130 MM HG: CPT | Performed by: NURSE PRACTITIONER

## 2025-07-03 PROCEDURE — 99396 PREV VISIT EST AGE 40-64: CPT | Performed by: NURSE PRACTITIONER

## 2025-07-03 PROCEDURE — 3078F DIAST BP <80 MM HG: CPT | Performed by: NURSE PRACTITIONER

## 2025-07-03 RX ORDER — FLUCONAZOLE 150 MG/1
150 TABLET ORAL ONCE
Qty: 2 TABLET | Refills: 0 | Status: SHIPPED | OUTPATIENT
Start: 2025-07-03 | End: 2025-07-03

## 2025-07-03 ASSESSMENT — ENCOUNTER SYMPTOMS
SHORTNESS OF BREATH: 0
NAUSEA: 0
ABDOMINAL PAIN: 0
COUGH: 0

## 2025-07-03 NOTE — PROGRESS NOTES
Subjective:      Patient ID: Nyasia Rinaldi is a 40 y.o. female.    HPI: Follow Up    Chief Complaint   Patient presents with    3 Month Follow-Up    Diabetes    Results       Patient Active Problem List   Diagnosis    Asthma with bronchitis    Diabetes mellitus, type 2 (HCC)    Primary hypertension    Left-sided weakness    Intermittent asthma without complication    Cervical stenosis of spinal canal    Intractable headache    Acute left-sided weakness    Chest pain, atypical    History of syncope appear vv    Hx of Bilateral leg edema on bumex for 1 yr    Dizziness, nonspecific    Bilateral carotid bruits       ENT - Dr. Reyes  SURG - Dr. Parkinson  NEURO - Dr. Brown  CARDIO - Dr. Turner  ORTHO -  [facility]    MAMMO - 2025    Denies CP, SOB or chest tightness.  Following with CARDIO related to lightheadedness.  STRESS Test, Holter and ECHO 2025 was unremarkable    Following with NEURO for migraines.  On Topamax 50 mg.  MRI Brain unremarkable.  Had recent work up for stroke like symptoms with negative results - CTA Brain and NECK.  Dizziness with head movements - turning over in bed or sitting up.   Room spinning.     On Buspar 10 mg Daily.   Failed on Cymbalta, Effexor and Pristiq.  No better.  Stressors.  Anxiety dominant.      BP stable. Losartan 25 mg and Coreg 6.25 mg.    Microalbuminuria POS.     BP Readings from Last 3 Encounters:   07/03/25 128/74   06/30/25 (!) 136/91   06/23/25 (!) 127/91       Labs reviewed.     On Trulicity 3 mg Weekly and Farxia 10 mg.   Having some vaginal irritation.     Lab Results   Component Value Date    LABA1C 8.1 (H) 06/30/2025    LABA1C 9.3 (A) 03/18/2025    LABA1C 7.0 (H) 10/31/2024     Lab Results   Component Value Date     (H) 06/30/2025       On Lipitor 40 mg.     No components found for: \"CHLPL\"  Lab Results   Component Value Date    TRIG 144 03/18/2025    TRIG 129 03/13/2024    TRIG 74 05/12/2023     Lab Results   Component Value Date    HDL 41 03/18/2025

## 2025-07-03 NOTE — PATIENT INSTRUCTIONS
You may receive a survey about your visit with us today. The feedback from our patients helps us identify what is working well and where the service to all patients can be enhanced. Thank you!     Tobacco Cessation Programs     Telephonic behavior modification  1-800-QUIT-NOW (165-0099)  Counseling service for those who are ready to quit using tobacco.    Available for uninsured [place] residents, Medicaid recipients, pregnant women, or patients whose health plans or employers are members of the [place] [employer]    Online behavior modification  http://[place].Quitlogix.org  Online support program to help patients through each step of the quitting process.  Available 24 hours a day 7 days a week.  Provides up to date researched based tool, step-by-step guides, and motivational messages.    Online behavior modification  www.lungusa.org/stop-smoking/how-to-quit  HelpLine: 1-800-LUNG-[place] (492-2930)  Email questions to:  questions@[facility].org   Website offers resources to help tobacco users figure out their reasons for quitting and then take the big step of quitting for good.    Hypnosis  Location: [address] [place] [address]  Contact: Brooke Chan, PhD at [phone]  Hypnosis for tobacco cessation  Cost $225 for the initial session and $175 for each session afterwards.  Most patients require 6-8 sessions.  There is the option to submit through the patient’s insurance.    Hypnosis and behavior modification  Location: [address] [place] [address]  Contact: Marcos Burgess, PhD at [phone]  Counseling and hypnosis for nicotine addition  Cost: For uninsured patients:  Please call above phone number  Cost for insured patients depends on patient’s insurance plan.    Behavior modification  Location: [facility], [address] [place] [address]  Contact: [phone]  Services include four one-on-one appointments between the patient and a respiratory therapist.  The four appointments

## 2025-07-22 ENCOUNTER — OFFICE VISIT (OUTPATIENT)
Age: 40
End: 2025-07-22
Payer: COMMERCIAL

## 2025-07-22 VITALS
DIASTOLIC BLOOD PRESSURE: 82 MMHG | HEART RATE: 97 BPM | SYSTOLIC BLOOD PRESSURE: 116 MMHG | BODY MASS INDEX: 32.82 KG/M2 | OXYGEN SATURATION: 100 % | TEMPERATURE: 98.1 F | HEIGHT: 66 IN | WEIGHT: 204.2 LBS

## 2025-07-22 DIAGNOSIS — G44.001 INTRACTABLE CLUSTER HEADACHE SYNDROME, UNSPECIFIED CHRONICITY PATTERN: ICD-10-CM

## 2025-07-22 DIAGNOSIS — Z87.898 HISTORY OF SYNCOPE: ICD-10-CM

## 2025-07-22 DIAGNOSIS — J45.20 MILD INTERMITTENT ASTHMA WITHOUT COMPLICATION: ICD-10-CM

## 2025-07-22 DIAGNOSIS — R29.818 SUSPECTED SLEEP APNEA: Primary | ICD-10-CM

## 2025-07-22 DIAGNOSIS — R06.83 LOUD SNORING: ICD-10-CM

## 2025-07-22 DIAGNOSIS — I10 PRIMARY HYPERTENSION: ICD-10-CM

## 2025-07-22 PROCEDURE — G8417 CALC BMI ABV UP PARAM F/U: HCPCS | Performed by: INTERNAL MEDICINE

## 2025-07-22 PROCEDURE — 4004F PT TOBACCO SCREEN RCVD TLK: CPT | Performed by: INTERNAL MEDICINE

## 2025-07-22 PROCEDURE — 3079F DIAST BP 80-89 MM HG: CPT | Performed by: INTERNAL MEDICINE

## 2025-07-22 PROCEDURE — 3074F SYST BP LT 130 MM HG: CPT | Performed by: INTERNAL MEDICINE

## 2025-07-22 PROCEDURE — G8427 DOCREV CUR MEDS BY ELIG CLIN: HCPCS | Performed by: INTERNAL MEDICINE

## 2025-07-22 PROCEDURE — 99204 OFFICE O/P NEW MOD 45 MIN: CPT | Performed by: INTERNAL MEDICINE

## 2025-07-22 NOTE — PROGRESS NOTES
[facility]  Sleep Medicine Clinic Note - New Consult  Note written by:   Juan Carlos Blackwell M.D, [facility]    Critical Care & Pulmonary Medicine   Name : Nyasia Rinaldi     : 1985       MR/Act: [id number], Service date : 2025 9:47 AM    PCP  : Michael Patel APRN - CNP    Referred by: Paige L Leopold, APRN -*     Prior history of sleep study  : NO                      Non smoker  Currently using Oxygen NC/Liters : 0                      Chief complaint: Nyasia Rinaldi is a 40 y.o.oldfemale came for further evaluation regarding   her ?   Suspected:   obstructive sleep apnea, upper airway resistance syndrome, insomnia, poor sleep hygiene,  Sleep disorder due to general medical condition (Obesity), Breathing-related sleep disorder     Sleep Symptoms complex  Witnessed by her - spouse  VANIA : Body mass index is 32.96 kg/m².   : excessive daytime sleepiness, insomnia, witnessed apnea, snoring, and choking at night.     Associated Symptoms: morning fatigue, daytime fatigue, nocturia, snoring, witnessed apneic episodes, morning headaches, irritability, and excessive worry    CPAP: NO  Insomnia   Moderate, Precipitating event   absent  Use of bed for activities other than sleep  present - TV, Bills  Conditioned arousal to bed or bedroom  absent  Use of alcohol, nicotine, or caffeine prior to bedtime  absent  Sleep environment issues  present -    Sleep quality in different environment or situations   absent  Anxiety about sleep/going to bed   present -   Irritable/Grande absent  Difficulty with memory/concentration present -    Watching the clock absent   Narcolepsy   None  Parasomnias    none  Restless Leg    None  Obesity   moderateClass III (morbid obesity) BMI > 40  Stop Bang   Snore loudly  Tired, fatigues, or daytime sleepiness  Observed having apnea during sleep  Hypertension  Age > 50  BMI > 35  Neck circumference > 40cm  [place]   9  [place]    39    History

## 2025-07-28 ENCOUNTER — PATIENT MESSAGE (OUTPATIENT)
Dept: FAMILY MEDICINE CLINIC | Age: 40
End: 2025-07-28

## 2025-08-05 DIAGNOSIS — E11.9 TYPE 2 DIABETES MELLITUS WITHOUT COMPLICATION, WITHOUT LONG-TERM CURRENT USE OF INSULIN (HCC): ICD-10-CM

## 2025-08-05 RX ORDER — LOSARTAN POTASSIUM 25 MG/1
25 TABLET ORAL DAILY
Qty: 90 TABLET | Refills: 1 | Status: SHIPPED | OUTPATIENT
Start: 2025-08-05 | End: 2026-02-01

## 2025-08-19 ENCOUNTER — HOSPITAL ENCOUNTER (OUTPATIENT)
Dept: SLEEP CENTER | Age: 40
Discharge: HOME | End: 2025-08-21
Payer: COMMERCIAL

## 2025-08-19 DIAGNOSIS — R06.83 LOUD SNORING: ICD-10-CM

## 2025-08-19 DIAGNOSIS — R29.818 SUSPECTED SLEEP APNEA: ICD-10-CM

## 2025-08-19 DIAGNOSIS — I10 PRIMARY HYPERTENSION: ICD-10-CM

## 2025-08-19 DIAGNOSIS — J45.20 MILD INTERMITTENT ASTHMA WITHOUT COMPLICATION: ICD-10-CM

## 2025-08-19 PROCEDURE — 95806 SLEEP STUDY UNATT&RESP EFFT: CPT

## 2025-08-26 ENCOUNTER — OFFICE VISIT (OUTPATIENT)
Age: 40
End: 2025-08-26
Payer: COMMERCIAL

## 2025-08-26 VITALS
SYSTOLIC BLOOD PRESSURE: 128 MMHG | DIASTOLIC BLOOD PRESSURE: 72 MMHG | BODY MASS INDEX: 33.62 KG/M2 | HEIGHT: 66 IN | HEART RATE: 105 BPM | WEIGHT: 209.2 LBS | TEMPERATURE: 98.9 F | OXYGEN SATURATION: 98 %

## 2025-08-26 DIAGNOSIS — R06.83 LOUD SNORING: ICD-10-CM

## 2025-08-26 DIAGNOSIS — Z99.89: Primary | ICD-10-CM

## 2025-08-26 DIAGNOSIS — G47.33: Primary | ICD-10-CM

## 2025-08-26 DIAGNOSIS — I10 PRIMARY HYPERTENSION: ICD-10-CM

## 2025-08-26 PROCEDURE — 99213 OFFICE O/P EST LOW 20 MIN: CPT | Performed by: INTERNAL MEDICINE

## 2025-08-26 PROCEDURE — 3074F SYST BP LT 130 MM HG: CPT | Performed by: INTERNAL MEDICINE

## 2025-08-26 PROCEDURE — 3078F DIAST BP <80 MM HG: CPT | Performed by: INTERNAL MEDICINE

## 2025-08-26 PROCEDURE — G8427 DOCREV CUR MEDS BY ELIG CLIN: HCPCS | Performed by: INTERNAL MEDICINE

## 2025-08-26 PROCEDURE — 4004F PT TOBACCO SCREEN RCVD TLK: CPT | Performed by: INTERNAL MEDICINE

## 2025-08-26 PROCEDURE — G8417 CALC BMI ABV UP PARAM F/U: HCPCS | Performed by: INTERNAL MEDICINE

## 2025-09-03 RX ORDER — TOPIRAMATE 50 MG/1
50 TABLET, FILM COATED ORAL DAILY
Qty: 90 TABLET | Refills: 1 | Status: SHIPPED | OUTPATIENT
Start: 2025-09-03

## 2025-09-06 ENCOUNTER — APPOINTMENT (OUTPATIENT)
Dept: GENERAL RADIOLOGY | Age: 40
End: 2025-09-06
Payer: COMMERCIAL

## 2025-09-06 ENCOUNTER — HOSPITAL ENCOUNTER (EMERGENCY)
Age: 40
Discharge: HOME | End: 2025-09-06
Payer: COMMERCIAL

## 2025-09-06 VITALS
TEMPERATURE: 98.3 F | BODY MASS INDEX: 33.89 KG/M2 | RESPIRATION RATE: 16 BRPM | HEART RATE: 98 BPM | OXYGEN SATURATION: 99 % | WEIGHT: 210 LBS | DIASTOLIC BLOOD PRESSURE: 97 MMHG | SYSTOLIC BLOOD PRESSURE: 158 MMHG

## 2025-09-06 DIAGNOSIS — S39.012A STRAIN OF LUMBAR REGION, INITIAL ENCOUNTER: ICD-10-CM

## 2025-09-06 DIAGNOSIS — S83.91XA SPRAIN OF RIGHT KNEE, UNSPECIFIED LIGAMENT, INITIAL ENCOUNTER: Primary | ICD-10-CM

## 2025-09-06 PROCEDURE — 73564 X-RAY EXAM KNEE 4 OR MORE: CPT

## 2025-09-06 PROCEDURE — 72100 X-RAY EXAM L-S SPINE 2/3 VWS: CPT

## 2025-09-06 RX ORDER — ETODOLAC 300 MG/1
300 CAPSULE ORAL EVERY 8 HOURS
Qty: 30 CAPSULE | Refills: 0 | Status: SHIPPED | OUTPATIENT
Start: 2025-09-06

## 2025-09-06 ASSESSMENT — PAIN DESCRIPTION - ORIENTATION: ORIENTATION: RIGHT

## 2025-09-06 ASSESSMENT — PAIN SCALES - GENERAL: PAINLEVEL_OUTOF10: 7

## 2025-09-06 ASSESSMENT — PAIN DESCRIPTION - LOCATION: LOCATION: BACK;KNEE

## (undated) PROCEDURE — 0RT30ZZ RESECTION OF CERVICAL VERTEBRAL DISC, OPEN APPROACH: ICD-10-PCS

## (undated) PROCEDURE — 0RG20J0 FUSION OF 2 OR MORE CERVICAL VERTEBRAL JOINTS WITH SYNTHETIC SUBSTITUTE, ANTERIOR APPROACH, ANTERIOR COLUMN, OPEN APPROACH: ICD-10-PCS

## (undated) DEVICE — SOLUTION SURG PREP POV IOD 7.5% 4 OZ

## (undated) DEVICE — SYRINGE MED 10ML LUERLOCK TIP W/O SFTY DISP

## (undated) DEVICE — GLOVE ORANGE PI 7 1/2   MSG9075

## (undated) DEVICE — 3M™ TRANSPORE™ WHITE SURGICAL TAPE 1534-1, 1 INCH X 10 YARD (2,5CM X 9,1M), 12 ROLLS/CARTON 10 CARTONS/CASE: Brand: 3M™ TRANSPORE™

## (undated) DEVICE — BLADE CLIPPER GEN PURP NS

## (undated) DEVICE — PACK PROCEDURE SURG SET UP SRMC

## (undated) DEVICE — RELOAD STPL L60MM H1.5-3.6MM REG TISS BLU GRIPPING SURF B

## (undated) DEVICE — SPONGE GZ W4XL4IN COT 12 PLY TYP VII WVN C FLD DSGN

## (undated) DEVICE — PAD,ABDOMINAL,5"X9",ST,LF,25/BX: Brand: MEDLINE INDUSTRIES, INC.

## (undated) DEVICE — BLADE,CARBON-STEEL,11,STRL,DISPOSABLE,TB: Brand: MEDLINE

## (undated) DEVICE — SHEARS ENDOSCP L9CM CRV HARM FOCS +

## (undated) DEVICE — YANKAUER,BULB TIP,W/O VENT,RIGID,STERILE: Brand: MEDLINE

## (undated) DEVICE — TUBING, SUCTION, 1/4" X 20', STRAIGHT: Brand: MEDLINE INDUSTRIES, INC.

## (undated) DEVICE — SPONGE LAP W18XL18IN WHT COT 4 PLY FLD STRUNG RADPQ DISP ST

## (undated) DEVICE — EVACUATOR SURG 100CC SIL BLB SUCT RESVR FOR CLS WND DRNGE

## (undated) DEVICE — DRAIN SURG FLAT W7MMXL20CM FULL PERF

## (undated) DEVICE — GAUZE,SPONGE,8"X4",12PLY,XRAY,STRL,LF: Brand: MEDLINE

## (undated) DEVICE — SOLUTION IV IRRIG WATER 500ML POUR BRL ST 2F7113

## (undated) DEVICE — SUTURE V-LOC 180 SZ 3-0 L9IN ABSRB GRN L26MM V-20 1/2 CIR VLOCL0644

## (undated) DEVICE — SUTURE MONOCRYL SZ 4-0 L27IN ABSRB UD L19MM PS-2 1/2 CIR PRIM Y426H

## (undated) DEVICE — Device: Brand: ENDOGATOR KIT

## (undated) DEVICE — Device

## (undated) DEVICE — GLOVE SURG SZ 85 L12IN FNGR ORTHO 126MIL CRM LTX FREE

## (undated) DEVICE — SOLUTION IV IRRIG WATER 1000ML POUR BRL 2F7114

## (undated) DEVICE — COVER ARMBRD W13XL28.5IN IMPERV BLU FOR OP RM

## (undated) DEVICE — NEEDLE SPNL 22GA L3.5IN BLK HUB S STL REG WALL FIT STYL W/

## (undated) DEVICE — GOWN,SIRUS,NON REINFRCD,LARGE,SET IN SL: Brand: MEDLINE

## (undated) DEVICE — WOUND RETRACTOR AND PROTECTOR: Brand: ALEXIS O WOUND PROTECTOR-RETRACTOR

## (undated) DEVICE — SOLUTION IV 1000ML LAC RINGERS PH 6.5 INJ USP VIAFLX PLAS

## (undated) DEVICE — Z DISCONTINUED BY MEDLINE USE 2711682 TRAY SKIN PREP DRY W/ PREM GLV

## (undated) DEVICE — PREP SOL PVP IODINE 4%  4 OZ/BTL

## (undated) DEVICE — JELLY,LUBE,STERILE,FLIP TOP,TUBE,2-OZ: Brand: MEDLINE

## (undated) DEVICE — GLOVE SURG SZ 65 THK91MIL LTX FREE SYN POLYISOPRENE

## (undated) DEVICE — KIT INF CTRL 2OZ LUB TBNG L12FT DBL END BRSH SYR OP4

## (undated) DEVICE — PACK PROC LAP II AURORA

## (undated) DEVICE — STRIP,CLOSURE,WOUND,MEDI-STRIP,1/2X4: Brand: MEDLINE

## (undated) DEVICE — 4.0MM PRECISION ROUND

## (undated) DEVICE — COLLAR CERV UNIV AD L13-19IN TRACH OPN TWO PC RIG POLYETH

## (undated) DEVICE — SET LNR RED GRN W/ BASE CLEANASCOPE

## (undated) DEVICE — SOLUTION IV 1000ML 0.9% SOD CHL PH 5 INJ USP VIAFLX PLAS

## (undated) DEVICE — CATHETER IV 18GA L1.16IN OD1.27-1.3462MM ID0.9398-1.016MM

## (undated) DEVICE — 3M™ TRANSPORE™ WHITE SURGICAL TAPE 1534-2, 2 INCH X 10 YARD (5CM X 9,1M), 6 ROLLS/CARTON 10 CARTONS/CASE: Brand: 3M™ TRANSPORE™

## (undated) DEVICE — RELOAD STPL H4.1X2MM DIA60MM THCK TISS GRN 6 ROW PWR GST B

## (undated) DEVICE — SUTURE V-LOC 180 SZ 2-0 L9IN ABSRB VLT GS-21 L37MM 1/2 CIR VLOCM0345

## (undated) DEVICE — BLANKET WRM W29.9XL79.1IN UP BODY FORC AIR MISTRAL-AIR